# Patient Record
Sex: MALE | Race: WHITE | Employment: OTHER | URBAN - NONMETROPOLITAN AREA
[De-identification: names, ages, dates, MRNs, and addresses within clinical notes are randomized per-mention and may not be internally consistent; named-entity substitution may affect disease eponyms.]

---

## 2019-05-09 RX ORDER — PREDNISONE 1 MG/1
1 TABLET ORAL DAILY
Qty: 60 TABLET | Refills: 5 | Status: SHIPPED | OUTPATIENT
Start: 2019-05-09 | End: 2019-06-24 | Stop reason: SDUPTHER

## 2019-05-09 NOTE — TELEPHONE ENCOUNTER
Patient called in stating that he was given a script of prednisone by you, 1mg bid. Patient is out of medication and needed a new script sent in to [facility] in [place]. I pended the medication  If you would like to send. Thanks!

## 2019-06-14 ENCOUNTER — OFFICE VISIT (OUTPATIENT)
Dept: FAMILY MEDICINE CLINIC | Age: 69
End: 2019-06-14
Payer: MEDICARE

## 2019-06-14 ENCOUNTER — HOSPITAL ENCOUNTER (OUTPATIENT)
Age: 69
Discharge: HOME | End: 2019-06-16
Payer: MEDICARE

## 2019-06-14 VITALS
DIASTOLIC BLOOD PRESSURE: 76 MMHG | BODY MASS INDEX: 30.75 KG/M2 | HEIGHT: 73 IN | WEIGHT: 232 LBS | OXYGEN SATURATION: 98 % | SYSTOLIC BLOOD PRESSURE: 136 MMHG | HEART RATE: 72 BPM

## 2019-06-14 DIAGNOSIS — E74.39 GLUCOSE INTOLERANCE: ICD-10-CM

## 2019-06-14 DIAGNOSIS — M35.3 PMR (POLYMYALGIA RHEUMATICA) (HCC): ICD-10-CM

## 2019-06-14 DIAGNOSIS — I38 VALVULAR HEART DISEASE: ICD-10-CM

## 2019-06-14 DIAGNOSIS — Z12.5 PROSTATE CANCER SCREENING: ICD-10-CM

## 2019-06-14 DIAGNOSIS — R00.0 TACHYCARDIA: Primary | ICD-10-CM

## 2019-06-14 DIAGNOSIS — I10 ESSENTIAL HYPERTENSION: ICD-10-CM

## 2019-06-14 DIAGNOSIS — E78.2 MIXED HYPERLIPIDEMIA: ICD-10-CM

## 2019-06-14 LAB
ALBUMIN SERPL-MCNC: 4.1 G/DL (ref 3.5–5.2)
ALP BLD-CCNC: 86 U/L (ref 40–129)
ALT SERPL-CCNC: 17 U/L (ref 0–40)
ANION GAP SERPL CALCULATED.3IONS-SCNC: 11 MMOL/L (ref 7–16)
AST SERPL-CCNC: 14 U/L (ref 0–39)
BILIRUB SERPL-MCNC: 0.3 MG/DL (ref 0–1.2)
BUN BLDV-MCNC: 17 MG/DL (ref 8–23)
CALCIUM SERPL-MCNC: 9.2 MG/DL (ref 8.6–10.2)
CHLORIDE BLD-SCNC: 102 MMOL/L (ref 98–107)
CHOLESTEROL, TOTAL: 164 MG/DL (ref 0–199)
CO2: 28 MMOL/L (ref 22–29)
CREAT SERPL-MCNC: 1.2 MG/DL (ref 0.7–1.2)
GFR AFRICAN AMERICAN: >60
GFR NON-AFRICAN AMERICAN: >60 ML/MIN/1.73
GLUCOSE BLD-MCNC: 103 MG/DL (ref 74–99)
HBA1C MFR BLD: 6.1 %
HDLC SERPL-MCNC: 43 MG/DL
LDL CHOLESTEROL CALCULATED: 97 MG/DL (ref 0–99)
POTASSIUM SERPL-SCNC: 4.3 MMOL/L (ref 3.5–5)
PROSTATE SPECIFIC ANTIGEN: 0.49 NG/ML (ref 0–4)
SEDIMENTATION RATE, ERYTHROCYTE: 0 MM/HR (ref 0–15)
SODIUM BLD-SCNC: 141 MMOL/L (ref 132–146)
TOTAL PROTEIN: 6.9 G/DL (ref 6.4–8.3)
TRIGL SERPL-MCNC: 118 MG/DL (ref 0–149)
VLDLC SERPL CALC-MCNC: 24 MG/DL

## 2019-06-14 PROCEDURE — 85651 RBC SED RATE NONAUTOMATED: CPT

## 2019-06-14 PROCEDURE — 80053 COMPREHEN METABOLIC PANEL: CPT

## 2019-06-14 PROCEDURE — 36415 COLL VENOUS BLD VENIPUNCTURE: CPT

## 2019-06-14 PROCEDURE — 80061 LIPID PANEL: CPT

## 2019-06-14 PROCEDURE — 99215 OFFICE O/P EST HI 40 MIN: CPT | Performed by: INTERNAL MEDICINE

## 2019-06-14 PROCEDURE — 83036 HEMOGLOBIN GLYCOSYLATED A1C: CPT | Performed by: INTERNAL MEDICINE

## 2019-06-14 PROCEDURE — G0103 PSA SCREENING: HCPCS

## 2019-06-14 RX ORDER — AMLODIPINE BESYLATE AND BENAZEPRIL HYDROCHLORIDE 10; 20 MG/1; MG/1
CAPSULE ORAL
COMMUNITY
Start: 2013-09-25 | End: 2019-09-24 | Stop reason: SDUPTHER

## 2019-06-14 RX ORDER — ATORVASTATIN CALCIUM 10 MG/1
TABLET, FILM COATED ORAL
COMMUNITY
Start: 2013-09-21 | End: 2020-01-07 | Stop reason: SDUPTHER

## 2019-06-14 RX ORDER — OMEPRAZOLE 20 MG/1
CAPSULE, DELAYED RELEASE ORAL
COMMUNITY
Start: 2019-04-11 | End: 2019-10-08 | Stop reason: SDUPTHER

## 2019-06-14 RX ORDER — HYDROCHLOROTHIAZIDE 25 MG/1
TABLET ORAL
COMMUNITY
Start: 2019-04-11 | End: 2019-10-08 | Stop reason: SDUPTHER

## 2019-06-14 NOTE — PROGRESS NOTES
19  Noy Fitzgerald : 1950 Sex: male  Age: 77 y.o. Chief Complaint   Patient presents with    Hypertension    Hyperlipidemia       Polymyalgia rheumatica. Patient is down to 1 mg of prednisone. His musculoskeletal symptoms are greatly improved and remain improved. He does have some right shoulder pain but this seems to be isolated and not consistent with recurrent polymyalgia rheumatica. Patient blood pressure is well controlled. Patient was evaluated last year by cardiology with echocardiogram and nuclear stress testing. These were normal.    Of concern the patient is complaining of tachycardia. He also describes some chest pressure occasionally with exertion. He states the rapid heartbeat starts first.  Patient is not lost weight and he is not displaying other symptoms of hyperthyroidism. She denies any GI or  symptoms including melanotic stool. Review of Systems  PMH:  Health Maintenance:  Bone Density Test Screening - (2018)  Bone Density Scan - (2017)  Colonoscopy Screening - (10/16/2017)  Colonoscopy - (2016)  Colonoscopy - (10/16/2017)  Colonoscopy - (2013)  Influenza Vaccination - (2016)  Psa Test - (2019)  Prostate Exam - (2019)  Physical Exam - (2019)  Rectal Exam - (2019)  An Sandoval  1950 Page #2  Influenza Vaccination - (10/3/2018)  EGD [facility] SEE REPORT  Zostavax - (2014) SLIP GIVEN  Prevnar Vaccine - (2016) SLIP GIVEN  Pneumonia Vaccination - (2017)  2D ECHO - (2018)  Shingrix Vaccine (Shingles) - SLIP GIVEN  Hypertension. Hyperlipidemia. Glucose intolerance. Metabolic syndrome. Gastroesophageal reflux disease.   EROSIVE GASTRITIS - EGD- 10/10 [facility] OMEPRAZOLE STARTED  LEFT SHOULDER PAIN- REFERRED TO [facility]- SUSPECT RCT  CARPAL TUNNEL- WRIST SPLINT GIVEN   RIGHT INGUINAL HERNIA- REFERRED TO [facility]   COLONIC POLYPS   DENTAL CARIES/EROSION-REMINDED EACH VISIT TO OBTAIN DENTAL EVALUATION  PROXIMAL MUSCLE PAIN 12/5/17-[facility]-PREDNISONE STARTED-PMR  AORTIC STENOSIS/PALPITATIONS/DIASTOLIC DYSFUNCTION- REFER TO CARDIOLOGY 3/21/18  Tachycardia- 6/14/2019 holter ordered  Surgical Hx:  Appendectomy, Tonsillectomy W/ Adenoidectomy, Hernia Repair  Cataract - (2016)  Left Shoulder Surgery - (2016) [facility]  Reviewed, no changes. FH:  Father:  Rheumatic Heart Disease  Negative For Non Insulin Dependent Diabetes. Mother:  Alzheimer's Disease - FROM ANESTHESIA? PUD  Negative For Non Insulin Dependent Diabetes. Reviewed, no changes. SH:  Marital: . Work Status: autoworker - retired. Personal Habits: Cigarette Use: Negative For current cigarette smoker. Alcohol: Occasionally  consumes alcohol, Occasionally consumes alcohol - last beer 2 years ago. [place] Exercise Type: exercises  sporadically. Reviewed, no changes. Date: 02/06/2019  Was the patient queried about smoking behavior? Yes No  Does the patient currently smoke? Smoking: Patient has never smoked. Current Outpatient Medications:     omeprazole (PRILOSEC) 20 MG delayed release capsule, , Disp: , Rfl:     hydrochlorothiazide (HYDRODIURIL) 25 MG tablet, , Disp: , Rfl:     atorvastatin (LIPITOR) 10 MG tablet, , Disp: , Rfl:     amLODIPine-benazepril (LOTREL) 10-20 MG per capsule, , Disp: , Rfl:     predniSONE (DELTASONE) 1 MG tablet, Take 1 tablet by mouth daily, Disp: 60 tablet, Rfl: 5  No Known Allergies    No past medical history on file. No past surgical history on file. No family history on file.   Social History     Socioeconomic History    Marital status:      Spouse name: Not on file    Number of children: Not on file    Years of education: Not on file    Highest education level: Not on file   Occupational History    Not on file   Social Needs    Financial resource strain: Not on file    Food insecurity:     Worry: Not on file     Inability: Not on file    Transportation needs:     Medical: Not on file     Non-medical: Not on file   Tobacco Use    Smoking status: Former Smoker     Types: Cigarettes    Smokeless tobacco: Never Used   Substance and Sexual Activity    Alcohol use: Not on file    Drug use: Not on file    Sexual activity: Not on file   Lifestyle    Physical activity:     Days per week: Not on file     Minutes per session: Not on file    Stress: Not on file   Relationships    Social connections:     Talks on phone: Not on file     Gets together: Not on file     Attends Confucianism service: Not on file     Active member of club or organization: Not on file     Attends meetings of clubs or organizations: Not on file     Relationship status: Not on file    Intimate partner violence:     Fear of current or ex partner: Not on file     Emotionally abused: Not on file     Physically abused: Not on file     Forced sexual activity: Not on file   Other Topics Concern    Not on file   Social History Narrative    Not on file       Vitals:    19 0826   BP: 136/76   Pulse: 72   SpO2: 98%   Weight: 232 lb (105.2 kg)   Height: 6' 1\" (1.854 m)       Physical Exam  Exam:  Const: Appears healthy,well developed and well nourished. Appears obese. Eyes: EOMI in both eyes. PERRL. ENMT: External canals are clear and dry. Tympanic membranes are intact. External nose WNL. Eunice Main Lori  1950 Page #3  Moistness and normal color of the nasal mucosae. Septum is in the midline. Multiple missing teeth,  teeth show dental decay, discoloration and poor dentition. Gums appear healthy. Posterior pharynx  shows no exudate, irritation or redness. Neck: Supple and symmetric. Palpation reveals no adenopathy. No masses appreciated. Thyroid  exhibits no nodules or thyromegaly. No JVD. Resp: Respirations are unlabored. Respiration rate is normal. Auscultate good airflow. No rales,  rhonchi or wheezes appreciated over the lungs bilaterally. CV: Rhythm is regular. S1 is normal. S2 is normal. No S4 gallop. Grade 3/6, systolic murmur. Carotids: no bruits. Abdominal aorta is not palpable. Pedal pulses: 2+ and equal bilaterally. Extremities: No clubbing, cyanosis or edema. Abdomen: Bowel sounds are normoactive. Palpation of the abdomen reveals softness, but no  distension, organomegaly or tenderness. No abdominal masses. No palpable hepatosplenomegaly. GI/: Normal perianal area. Small amount of nonthrombosed external hemorrhoids at 6:00. Normal sphincter tone. Stool is brown and guaiac negative. No masses in the vault. Prostate is slightly firm and minimally enlarged. No nodules. Musculo: Walks with a normal gait. Upper Extremities: ROM is physiologic. Lower Extremities: Full  ROM bilaterally. Skin: Dry and warm with no rash. Neuro: Alert and oriented x3. Mood is normal. Affect is normal. Speech is articulate and fluent. Reflexes: DTR's are intact, symmetric and 2+ bilaterally. Psych: Patient's attitude is cooperative. Patient's affect is appropriate. Judgement is realistic. Insight  is appropriate. Assessment and Plan:  Tamiko Ahn was seen today for hypertension and hyperlipidemia. Diagnoses and all orders for this visit:    Tachycardia    Essential hypertension    Glucose intolerance    Mixed hyperlipidemia    Prostate cancer screening    PMR (polymyalgia rheumatica) (HCC)    Valvular heart disease    Concerning palpitations and tachycardia. Patient will have sedimentation rate and hopefully he will be able to get off of his prednisone entirely. He does have an appointment with Dr. Scott Juarez next week. Holter monitor will be ordered. Patient does have an upcoming appointment in July with cardiology. This may need to be sooner depending on the Holter results. Return in about 6 months (around 12/14/2019).       Seen By:  Lo Mckee MD

## 2019-06-17 ENCOUNTER — TELEPHONE (OUTPATIENT)
Dept: PRIMARY CARE CLINIC | Age: 69
End: 2019-06-17

## 2019-06-24 ENCOUNTER — OFFICE VISIT (OUTPATIENT)
Dept: RHEUMATOLOGY | Age: 69
End: 2019-06-24
Payer: MEDICARE

## 2019-06-24 VITALS
BODY MASS INDEX: 30.62 KG/M2 | TEMPERATURE: 97.5 F | SYSTOLIC BLOOD PRESSURE: 136 MMHG | HEIGHT: 73 IN | DIASTOLIC BLOOD PRESSURE: 68 MMHG | WEIGHT: 231 LBS | HEART RATE: 77 BPM | OXYGEN SATURATION: 97 %

## 2019-06-24 DIAGNOSIS — M35.3 PMR (POLYMYALGIA RHEUMATICA) (HCC): ICD-10-CM

## 2019-06-24 PROCEDURE — 99214 OFFICE O/P EST MOD 30 MIN: CPT | Performed by: INTERNAL MEDICINE

## 2019-06-24 RX ORDER — PREDNISONE 1 MG/1
1 TABLET ORAL DAILY
Qty: 60 TABLET | Refills: 5 | Status: SHIPPED | OUTPATIENT
Start: 2019-06-24 | End: 2019-12-11 | Stop reason: ALTCHOICE

## 2019-06-24 NOTE — PROGRESS NOTES
19    Name: Leonardo Rollins  : 1950  Age: 77 y.o. Sex: male    Patient is seen at the request of Rc Isbell MD    Patient presents for a rheumatology consultation regarding     Chief Complaint   Patient presents with    Joint Pain    Joint Swelling       19 :  Takes 1 mg once a day since April. Doing well. Denies joint pain, swelling and stiffness. Labs from 2019 were reviewed. ESR wnl. Vitals:    19 1019   BP: 136/68   Site: Left Upper Arm   Position: Sitting   Pulse: 77   Temp: 97.5 °F (36.4 °C)   TempSrc: Temporal   SpO2: 97%   Weight: 231 lb (104.8 kg)   Height: 6' 1\" (1.854 m)        Review of Systems       Current Outpatient Medications:     omeprazole (PRILOSEC) 20 MG delayed release capsule, , Disp: , Rfl:     hydrochlorothiazide (HYDRODIURIL) 25 MG tablet, , Disp: , Rfl:     atorvastatin (LIPITOR) 10 MG tablet, , Disp: , Rfl:     amLODIPine-benazepril (LOTREL) 10-20 MG per capsule, , Disp: , Rfl:     predniSONE (DELTASONE) 1 MG tablet, Take 1 tablet by mouth daily, Disp: 60 tablet, Rfl: 5  No Known Allergies      No past medical history on file. No family history on file. No past surgical history on file.    Social History     Socioeconomic History    Marital status:      Spouse name: Not on file    Number of children: Not on file    Years of education: Not on file    Highest education level: Not on file   Occupational History    Not on file   Social Needs    Financial resource strain: Not on file    Food insecurity:     Worry: Not on file     Inability: Not on file    Transportation needs:     Medical: Not on file     Non-medical: Not on file   Tobacco Use    Smoking status: Never Smoker    Smokeless tobacco: Never Used   Substance and Sexual Activity    Alcohol use: Not Currently    Drug use: Never    Sexual activity: Yes     Partners: Female   Lifestyle    Physical activity:     Days per week: Not on file     Minutes per session: Not on file    Stress: Not on file   Relationships    Social connections:     Talks on phone: Not on file     Gets together: Not on file     Attends [employer] service: Not on file     Active member of club or organization: Not on file     Attends meetings of clubs or organizations: Not on file     Relationship status: Not on file    Intimate partner violence:     Fear of current or ex partner: Not on file     Emotionally abused: Not on file     Physically abused: Not on file     Forced sexual activity: Not on file   Other Topics Concern    Not on file   Social History Narrative    Not on file      Social History     Social History Narrative    Not on file        Vitals:    06/24/19 1019   BP: 136/68   Site: Left Upper Arm   Position: Sitting   Pulse: 77   Temp: 97.5 °F (36.4 °C)   TempSrc: Temporal   SpO2: 97%   Weight: 231 lb (104.8 kg)   Height: 6' 1\" (1.854 m)        Physical Exam     Denise Everett was seen today for joint pain and joint swelling. Diagnoses and all orders for this visit:    PMR (polymyalgia rheumatica) (HCC)  -     C-Reactive Protein; Future  -     Sedimentation Rate; Future     Disease in remission. Taper prednisone by 1mg every other day for 6 weeks   Repeat ESR and C-RP before next visit     Return in about 8 weeks (around 8/19/2019). Jenny Lange MD     *This document was created using voice recognition software. Note was reviewed, however may contain grammatical errors.

## 2019-07-05 ENCOUNTER — OFFICE VISIT (OUTPATIENT)
Dept: FAMILY MEDICINE CLINIC | Age: 69
End: 2019-07-05
Payer: MEDICARE

## 2019-07-05 VITALS — DIASTOLIC BLOOD PRESSURE: 66 MMHG | OXYGEN SATURATION: 98 % | HEART RATE: 105 BPM | SYSTOLIC BLOOD PRESSURE: 118 MMHG

## 2019-07-05 DIAGNOSIS — R00.0 TACHYCARDIA: ICD-10-CM

## 2019-07-05 DIAGNOSIS — I48.0 PAROXYSMAL ATRIAL FIBRILLATION (HCC): Primary | ICD-10-CM

## 2019-07-05 PROCEDURE — 99214 OFFICE O/P EST MOD 30 MIN: CPT | Performed by: INTERNAL MEDICINE

## 2019-07-05 RX ORDER — METOPROLOL SUCCINATE 50 MG/1
50 TABLET, EXTENDED RELEASE ORAL DAILY
Qty: 30 TABLET | Refills: 5 | Status: SHIPPED | OUTPATIENT
Start: 2019-07-05 | End: 2020-01-07 | Stop reason: SDUPTHER

## 2019-07-05 NOTE — PROGRESS NOTES
Inability: Not on file    Transportation needs:     Medical: Not on file     Non-medical: Not on file   Tobacco Use    Smoking status: Never Smoker    Smokeless tobacco: Never Used   Substance and Sexual Activity    Alcohol use: Not Currently    Drug use: Never    Sexual activity: Yes     Partners: Female   Lifestyle    Physical activity:     Days per week: Not on file     Minutes per session: Not on file    Stress: Not on file   Relationships    Social connections:     Talks on phone: Not on file     Gets together: Not on file     Attends Rastafarian service: Not on file     Active member of club or organization: Not on file     Attends meetings of clubs or organizations: Not on file     Relationship status: Not on file    Intimate partner violence:     Fear of current or ex partner: Not on file     Emotionally abused: Not on file     Physically abused: Not on file     Forced sexual activity: Not on file   Other Topics Concern    Not on file   Social History Narrative    Not on file       Vitals:    19 1255   BP: 118/66   Pulse: 105   SpO2: 98%       Physical Exam  Exam:  Const: Appears healthy,well developed and well nourished. Appears obese. Eyes: EOMI in both eyes. PERRL. ENMT: External canals are clear and dry. Tympanic membranes are intact. External nose WNL. Naresh Sandoval  1950 Page #3  Moistness and normal color of the nasal mucosae. Septum is in the midline. Multiple missing teeth,  teeth show dental decay, discoloration and poor dentition. Gums appear healthy. Posterior pharynx  shows no exudate, irritation or redness. Neck: Supple and symmetric. Palpation reveals no adenopathy. No masses appreciated. Thyroid  exhibits no nodules or thyromegaly. No JVD. Resp: Respirations are unlabored. Respiration rate is normal. Auscultate good airflow. No rales,  rhonchi or wheezes appreciated over the lungs bilaterally. CV: Rhythm is regular. S1 is normal. S2 is normal. No S4 gallop.

## 2019-07-14 PROBLEM — Z12.5 PROSTATE CANCER SCREENING: Status: RESOLVED | Noted: 2019-06-14 | Resolved: 2019-07-14

## 2019-08-07 ENCOUNTER — OFFICE VISIT (OUTPATIENT)
Dept: FAMILY MEDICINE CLINIC | Age: 69
End: 2019-08-07
Payer: MEDICARE

## 2019-08-07 VITALS
DIASTOLIC BLOOD PRESSURE: 72 MMHG | BODY MASS INDEX: 30.09 KG/M2 | HEIGHT: 73 IN | SYSTOLIC BLOOD PRESSURE: 130 MMHG | HEART RATE: 70 BPM | WEIGHT: 227 LBS | OXYGEN SATURATION: 97 %

## 2019-08-07 DIAGNOSIS — I48.0 PAROXYSMAL ATRIAL FIBRILLATION (HCC): ICD-10-CM

## 2019-08-07 DIAGNOSIS — M35.3 PMR (POLYMYALGIA RHEUMATICA) (HCC): ICD-10-CM

## 2019-08-07 DIAGNOSIS — E78.2 MIXED HYPERLIPIDEMIA: ICD-10-CM

## 2019-08-07 DIAGNOSIS — I38 VALVULAR HEART DISEASE: ICD-10-CM

## 2019-08-07 DIAGNOSIS — I10 ESSENTIAL HYPERTENSION: Primary | ICD-10-CM

## 2019-08-07 DIAGNOSIS — E74.39 GLUCOSE INTOLERANCE: ICD-10-CM

## 2019-08-07 PROCEDURE — 99214 OFFICE O/P EST MOD 30 MIN: CPT | Performed by: INTERNAL MEDICINE

## 2019-08-07 NOTE — PROGRESS NOTES
STARTED-PMR  AORTIC STENOSIS/PALPITATIONS/DIASTOLIC DYSFUNCTION- REFER TO CARDIOLOGY 3/21/18  Tachycardia- 6/14/2019 holter ordered-ATRIAL FIB with rapid ventricular response-  Surgical Hx:  Appendectomy, Tonsillectomy W/ Adenoidectomy, Hernia Repair  Cataract - (2016)  Left Shoulder Surgery - (2016) [facility]  Reviewed, no changes. FH:  Father:  Rheumatic Heart Disease  Negative For Non Insulin Dependent Diabetes. Mother:  Alzheimer's Disease - FROM ANESTHESIA? PUD  Negative For Non Insulin Dependent Diabetes. Reviewed, no changes. SH:  Marital: . Work Status: autoworker - retired. Personal Habits: Cigarette Use: Negative For current cigarette smoker. Alcohol: Occasionally  consumes alcohol, Occasionally consumes alcohol - last beer 2 years ago. [place] Exercise Type: exercises  sporadically. Reviewed, no changes. Date: 02/06/2019  Was the patient queried about smoking behavior? Yes No  Does the patient currently smoke? Smoking: Patient has never smoked. Current Outpatient Medications:     apixaban (ELIQUIS) 5 MG TABS tablet, Take 1 tablet by mouth 2 times daily, Disp: 60 tablet, Rfl: 0    metoprolol succinate (TOPROL XL) 50 MG extended release tablet, Take 1 tablet by mouth daily, Disp: 30 tablet, Rfl: 5    predniSONE (DELTASONE) 1 MG tablet, Take 1 tablet by mouth daily, Disp: 60 tablet, Rfl: 5    omeprazole (PRILOSEC) 20 MG delayed release capsule, , Disp: , Rfl:     hydrochlorothiazide (HYDRODIURIL) 25 MG tablet, , Disp: , Rfl:     atorvastatin (LIPITOR) 10 MG tablet, , Disp: , Rfl:     amLODIPine-benazepril (LOTREL) 10-20 MG per capsule, , Disp: , Rfl:   No Known Allergies    No past medical history on file. No past surgical history on file. No family history on file.   Social History     Socioeconomic History    Marital status:      Spouse name: Not on file    Number of children: Not on file    Years of education: Not on file    Highest education level: Not on file   Occupational Respiration rate is normal. Auscultate good airflow. No rales,  rhonchi or wheezes appreciated over the lungs bilaterally. CV: Rhythm is regular. S1 is normal. S2 is normal. No S4 gallop. Grade 3/6, systolic murmur. Carotids: no bruits. Abdominal aorta is not palpable. Pedal pulses: 2+ and equal bilaterally. Extremities: No clubbing, cyanosis or edema. Abdomen: Bowel sounds are normoactive. Palpation of the abdomen reveals softness, but no  distension, organomegaly or tenderness. No abdominal masses. No palpable hepatosplenomegaly. Musculo: Walks with a normal gait. Upper Extremities: ROM is physiologic. Lower Extremities: Full  ROM bilaterally. Skin: Dry and warm with no rash. Neuro: Alert and oriented x3. Mood is normal. Affect is normal. Speech is articulate and fluent. Reflexes: DTR's are intact, symmetric and 2+ bilaterally. Psych: Patient's attitude is cooperative. Patient's affect is appropriate. Judgement is realistic. Insight  is appropriate. Arie Cummings was seen today for hypertension and hyperlipidemia. Diagnoses and all orders for this visit:    Essential hypertension    Valvular heart disease    Glucose intolerance    Mixed hyperlipidemia    PMR (polymyalgia rheumatica) (HCC)    Paroxysmal atrial fibrillation (HCC)    Patient's polymyalgia rheumatica is well controlled on current dose of medication. Patient is continued to be followed but he supposed to be vigilant regarding his symptoms change especially shortness of breath, chest pain etc.  Patient is to be seen back in 4 months. Continue to follow with rheumatology. He will need repeat echo in 1 year.

## 2019-08-12 ENCOUNTER — HOSPITAL ENCOUNTER (OUTPATIENT)
Age: 69
Discharge: HOME | End: 2019-08-14
Payer: MEDICARE

## 2019-08-12 DIAGNOSIS — M35.3 PMR (POLYMYALGIA RHEUMATICA) (HCC): ICD-10-CM

## 2019-08-12 LAB — C-REACTIVE PROTEIN: 0.1 MG/DL (ref 0–0.4)

## 2019-08-12 PROCEDURE — 85651 RBC SED RATE NONAUTOMATED: CPT

## 2019-08-12 PROCEDURE — 36415 COLL VENOUS BLD VENIPUNCTURE: CPT

## 2019-08-12 PROCEDURE — 86140 C-REACTIVE PROTEIN: CPT

## 2019-08-13 LAB — SEDIMENTATION RATE, ERYTHROCYTE: 2 MM/HR (ref 0–15)

## 2019-08-16 ENCOUNTER — OFFICE VISIT (OUTPATIENT)
Dept: PODIATRY | Age: 69
End: 2019-08-16
Payer: MEDICARE

## 2019-08-16 VITALS
SYSTOLIC BLOOD PRESSURE: 125 MMHG | DIASTOLIC BLOOD PRESSURE: 78 MMHG | TEMPERATURE: 98.6 F | WEIGHT: 228 LBS | BODY MASS INDEX: 30.08 KG/M2

## 2019-08-16 DIAGNOSIS — B35.1 TINEA UNGUIUM: ICD-10-CM

## 2019-08-16 DIAGNOSIS — M79.674 PAIN IN TOE OF RIGHT FOOT: ICD-10-CM

## 2019-08-16 DIAGNOSIS — I73.9 PERIPHERAL VASCULAR DISEASE, UNSPECIFIED (HCC): ICD-10-CM

## 2019-08-16 DIAGNOSIS — M79.675 PAIN IN LEFT TOE(S): ICD-10-CM

## 2019-08-16 DIAGNOSIS — L30.9 DERMATITIS: Primary | ICD-10-CM

## 2019-08-16 PROCEDURE — 99202 OFFICE O/P NEW SF 15 MIN: CPT | Performed by: PODIATRIST

## 2019-08-16 PROCEDURE — 11721 DEBRIDE NAIL 6 OR MORE: CPT | Performed by: PODIATRIST

## 2019-08-16 RX ORDER — KETOCONAZOLE 20 MG/G
CREAM TOPICAL
Qty: 30 G | Refills: 1 | Status: SHIPPED | OUTPATIENT
Start: 2019-08-16 | End: 2019-12-11 | Stop reason: SDUPTHER

## 2019-08-16 RX ORDER — KETOCONAZOLE 20 MG/ML
SHAMPOO TOPICAL
Qty: 100 ML | Refills: 1 | Status: SHIPPED | OUTPATIENT
Start: 2019-08-16 | End: 2019-12-11 | Stop reason: SDUPTHER

## 2019-08-16 NOTE — PROGRESS NOTES
Left    Thickness  [x] [x] [x] [x] [x] [x] [x] [x] [x] [x]  5 4 3 2 1 1 2 3 4 5                         Right                                        Left    Dystrophic Changes   [x] [x] [x] [x] [x] [x] [x] [x] [x] [x]  5 4 3 2 1 1 2 3 4 5                         Right                                        Left    discolored   [x] [x] [x] [x] [x] [x] [x] [x] [x] [x]  5 4 3 2 1 1 2 3 4 5                          Right                                        Left    Incurvation/Ingrowin   [] [] [x] [x] [x] [x] [x] [x] [x] []  5 4 3 2 1 1 2 3 4 5                         Right                                        Left    Inflammation/Pain   [x] [x] [x] [x] [x] [x] [x] [x] [x] [x]  5 4 3 2 1 1 2 3 4 5                         Right                                        Left    General: AAO x 3 in NAD. Dermatologic Exam:  Skin lesion/ulceration Absent . Skin No rashes or nodules noted. .   Musculoskeletal:      Vascular: DP and PT pulses palpable 0/4, Bilateral.  CFT <3 seconds, Bilateral.  Hair growth absent to the level of the digits, Bilateral.  Edema absent, Bilateral.  Varicosities present, Bilateral. Erythema absent, Bilateral      Radiographs:       Asessment: Patient is a 77 y.o. male with:    Diagnosis Orders   1. Dermatitis     2. Tinea unguium     3. Pain in left toe(s)     4. Pain in toe of right foot     5. Peripheral vascular disease, unspecified ([facility]         Plan: Patient examined and evaluated. Current condition and treatment options discussed in detail. Discussed conservative and surgical options with the patient. Advised pt to . Verbal and written instructions given to patient. Contact office with any questions/problems/concerns. RTC in 3month(s). Debridement of all painful nails was performed with both manual and electrical debridement to prevent infection and/or ulceration. Patient tolerated the debridement well, without any complaints.   1-5 bilateral   Patient was asymptomatic after

## 2019-08-19 ENCOUNTER — OFFICE VISIT (OUTPATIENT)
Dept: RHEUMATOLOGY | Age: 69
End: 2019-08-19
Payer: MEDICARE

## 2019-08-19 VITALS
HEIGHT: 73 IN | HEART RATE: 69 BPM | BODY MASS INDEX: 30.35 KG/M2 | DIASTOLIC BLOOD PRESSURE: 82 MMHG | OXYGEN SATURATION: 98 % | TEMPERATURE: 98.6 F | SYSTOLIC BLOOD PRESSURE: 122 MMHG | WEIGHT: 229 LBS

## 2019-08-19 DIAGNOSIS — G89.29 CHRONIC RIGHT SHOULDER PAIN: ICD-10-CM

## 2019-08-19 DIAGNOSIS — M25.511 CHRONIC RIGHT SHOULDER PAIN: ICD-10-CM

## 2019-08-19 DIAGNOSIS — M35.3 PMR (POLYMYALGIA RHEUMATICA) (HCC): Primary | ICD-10-CM

## 2019-08-19 PROCEDURE — 99214 OFFICE O/P EST MOD 30 MIN: CPT | Performed by: INTERNAL MEDICINE

## 2019-08-19 NOTE — PROGRESS NOTES
tendinitis. Recommend PT . He would like to hold at this point. Return if symptoms worsen or fail to improve. Bella Wallace MD     *This document was created using voice recognition software. Note was reviewed, however may contain grammatical errors.

## 2019-09-24 RX ORDER — AMLODIPINE BESYLATE AND BENAZEPRIL HYDROCHLORIDE 10; 20 MG/1; MG/1
1 CAPSULE ORAL DAILY
Qty: 14 CAPSULE | Refills: 0 | Status: SHIPPED
Start: 2019-09-24 | End: 2020-03-23

## 2019-09-24 RX ORDER — AMLODIPINE BESYLATE AND BENAZEPRIL HYDROCHLORIDE 10; 20 MG/1; MG/1
1 CAPSULE ORAL DAILY
Qty: 90 CAPSULE | Refills: 1 | Status: SHIPPED | OUTPATIENT
Start: 2019-09-24 | End: 2019-12-11 | Stop reason: CLARIF

## 2019-09-24 NOTE — TELEPHONE ENCOUNTER
Pt is completely out of this medication I have submitted a refill request for 90 day supply but he also needs 14 days sent to local pharmacy.

## 2019-10-08 RX ORDER — OMEPRAZOLE 20 MG/1
CAPSULE, DELAYED RELEASE ORAL
Qty: 90 CAPSULE | Refills: 4 | Status: SHIPPED
Start: 2019-10-08 | End: 2020-10-14 | Stop reason: SDUPTHER

## 2019-10-08 RX ORDER — HYDROCHLOROTHIAZIDE 25 MG/1
TABLET ORAL
Qty: 90 TABLET | Refills: 4 | Status: SHIPPED | OUTPATIENT
Start: 2019-10-08 | End: 2019-12-11 | Stop reason: SDUPTHER

## 2019-10-23 RX ORDER — IBUPROFEN 200 MG
200 TABLET ORAL EVERY 6 HOURS PRN
COMMUNITY

## 2019-11-11 ENCOUNTER — PROCEDURE VISIT (OUTPATIENT)
Dept: PODIATRY | Age: 69
End: 2019-11-11
Payer: MEDICARE

## 2019-11-11 VITALS
HEIGHT: 73 IN | DIASTOLIC BLOOD PRESSURE: 74 MMHG | TEMPERATURE: 98.3 F | BODY MASS INDEX: 29.69 KG/M2 | WEIGHT: 224 LBS | SYSTOLIC BLOOD PRESSURE: 132 MMHG

## 2019-11-11 DIAGNOSIS — M79.675 PAIN IN LEFT TOE(S): ICD-10-CM

## 2019-11-11 DIAGNOSIS — B35.1 TINEA UNGUIUM: Primary | ICD-10-CM

## 2019-11-11 DIAGNOSIS — I73.9 PERIPHERAL VASCULAR DISEASE, UNSPECIFIED (HCC): ICD-10-CM

## 2019-11-11 DIAGNOSIS — M79.674 PAIN IN TOE OF RIGHT FOOT: ICD-10-CM

## 2019-11-11 PROBLEM — E78.2 MIXED HYPERLIPIDEMIA: Status: RESOLVED | Noted: 2019-06-14 | Resolved: 2019-11-11

## 2019-11-11 PROBLEM — L30.9 DERMATITIS: Status: RESOLVED | Noted: 2019-08-16 | Resolved: 2019-11-11

## 2019-11-11 PROCEDURE — 11721 DEBRIDE NAIL 6 OR MORE: CPT | Performed by: PODIATRIST

## 2019-12-11 ENCOUNTER — OFFICE VISIT (OUTPATIENT)
Dept: FAMILY MEDICINE CLINIC | Age: 69
End: 2019-12-11
Payer: MEDICARE

## 2019-12-11 ENCOUNTER — HOSPITAL ENCOUNTER (OUTPATIENT)
Age: 69
Discharge: HOME | End: 2019-12-13
Payer: MEDICARE

## 2019-12-11 VITALS
DIASTOLIC BLOOD PRESSURE: 70 MMHG | BODY MASS INDEX: 29.29 KG/M2 | OXYGEN SATURATION: 98 % | WEIGHT: 222 LBS | HEART RATE: 68 BPM | SYSTOLIC BLOOD PRESSURE: 126 MMHG

## 2019-12-11 DIAGNOSIS — I48.0 PAROXYSMAL ATRIAL FIBRILLATION (HCC): Primary | ICD-10-CM

## 2019-12-11 DIAGNOSIS — I10 ESSENTIAL HYPERTENSION: ICD-10-CM

## 2019-12-11 DIAGNOSIS — M35.3 PMR (POLYMYALGIA RHEUMATICA) (HCC): ICD-10-CM

## 2019-12-11 DIAGNOSIS — I38 VALVULAR HEART DISEASE: ICD-10-CM

## 2019-12-11 DIAGNOSIS — E74.39 GLUCOSE INTOLERANCE: ICD-10-CM

## 2019-12-11 LAB
ALBUMIN SERPL-MCNC: 4.5 G/DL (ref 3.5–5.2)
ALP BLD-CCNC: 88 U/L (ref 40–129)
ALT SERPL-CCNC: 15 U/L (ref 0–40)
ANION GAP SERPL CALCULATED.3IONS-SCNC: 17 MMOL/L (ref 7–16)
AST SERPL-CCNC: 13 U/L (ref 0–39)
BASOPHILS ABSOLUTE: 0.04 E9/L (ref 0–0.2)
BASOPHILS RELATIVE PERCENT: 0.6 % (ref 0–2)
BILIRUB SERPL-MCNC: 0.5 MG/DL (ref 0–1.2)
BUN BLDV-MCNC: 20 MG/DL (ref 8–23)
C-REACTIVE PROTEIN: 0.6 MG/DL (ref 0–0.4)
CALCIUM SERPL-MCNC: 9.4 MG/DL (ref 8.6–10.2)
CHLORIDE BLD-SCNC: 101 MMOL/L (ref 98–107)
CO2: 23 MMOL/L (ref 22–29)
CREAT SERPL-MCNC: 1.1 MG/DL (ref 0.7–1.2)
EOSINOPHILS ABSOLUTE: 0.04 E9/L (ref 0.05–0.5)
EOSINOPHILS RELATIVE PERCENT: 0.6 % (ref 0–6)
GFR AFRICAN AMERICAN: >60
GFR NON-AFRICAN AMERICAN: >60 ML/MIN/1.73
GLUCOSE BLD-MCNC: 101 MG/DL (ref 74–99)
HBA1C MFR BLD: 5.8 %
HCT VFR BLD CALC: 46.8 % (ref 37–54)
HEMOGLOBIN: 15 G/DL (ref 12.5–16.5)
IMMATURE GRANULOCYTES #: 0.01 E9/L
IMMATURE GRANULOCYTES %: 0.1 % (ref 0–5)
LYMPHOCYTES ABSOLUTE: 1.93 E9/L (ref 1.5–4)
LYMPHOCYTES RELATIVE PERCENT: 27.4 % (ref 20–42)
MCH RBC QN AUTO: 29.6 PG (ref 26–35)
MCHC RBC AUTO-ENTMCNC: 32.1 % (ref 32–34.5)
MCV RBC AUTO: 92.3 FL (ref 80–99.9)
MONOCYTES ABSOLUTE: 0.77 E9/L (ref 0.1–0.95)
MONOCYTES RELATIVE PERCENT: 10.9 % (ref 2–12)
NEUTROPHILS ABSOLUTE: 4.26 E9/L (ref 1.8–7.3)
NEUTROPHILS RELATIVE PERCENT: 60.4 % (ref 43–80)
PDW BLD-RTO: 13.7 FL (ref 11.5–15)
PLATELET # BLD: 206 E9/L (ref 130–450)
PMV BLD AUTO: 12 FL (ref 7–12)
POTASSIUM SERPL-SCNC: 4.2 MMOL/L (ref 3.5–5)
RBC # BLD: 5.07 E12/L (ref 3.8–5.8)
SEDIMENTATION RATE, ERYTHROCYTE: 7 MM/HR (ref 0–15)
SODIUM BLD-SCNC: 141 MMOL/L (ref 132–146)
TOTAL PROTEIN: 7.2 G/DL (ref 6.4–8.3)
WBC # BLD: 7.1 E9/L (ref 4.5–11.5)

## 2019-12-11 PROCEDURE — 83036 HEMOGLOBIN GLYCOSYLATED A1C: CPT | Performed by: INTERNAL MEDICINE

## 2019-12-11 PROCEDURE — 99214 OFFICE O/P EST MOD 30 MIN: CPT | Performed by: INTERNAL MEDICINE

## 2019-12-11 PROCEDURE — 80053 COMPREHEN METABOLIC PANEL: CPT

## 2019-12-11 PROCEDURE — 85651 RBC SED RATE NONAUTOMATED: CPT

## 2019-12-11 PROCEDURE — 86140 C-REACTIVE PROTEIN: CPT

## 2019-12-11 PROCEDURE — 85025 COMPLETE CBC W/AUTO DIFF WBC: CPT

## 2019-12-11 PROCEDURE — 36415 COLL VENOUS BLD VENIPUNCTURE: CPT

## 2019-12-11 RX ORDER — KETOCONAZOLE 20 MG/ML
SHAMPOO TOPICAL
Qty: 100 ML | Refills: 1 | Status: SHIPPED
Start: 2019-12-11 | End: 2020-05-05 | Stop reason: SDUPTHER

## 2019-12-11 RX ORDER — HYDROCHLOROTHIAZIDE 25 MG/1
TABLET ORAL
Qty: 90 TABLET | Refills: 4 | Status: SHIPPED
Start: 2019-12-11 | End: 2020-10-14 | Stop reason: SDUPTHER

## 2019-12-11 RX ORDER — KETOCONAZOLE 20 MG/G
CREAM TOPICAL
Qty: 30 G | Refills: 1 | Status: SHIPPED
Start: 2019-12-11 | End: 2020-05-05 | Stop reason: SDUPTHER

## 2020-01-07 RX ORDER — ATORVASTATIN CALCIUM 10 MG/1
10 TABLET, FILM COATED ORAL DAILY
Qty: 90 TABLET | Refills: 1 | Status: SHIPPED
Start: 2020-01-07 | End: 2020-07-13

## 2020-01-07 RX ORDER — METOPROLOL SUCCINATE 50 MG/1
50 TABLET, EXTENDED RELEASE ORAL DAILY
Qty: 30 TABLET | Refills: 5 | Status: SHIPPED
Start: 2020-01-07 | End: 2020-07-06 | Stop reason: SDUPTHER

## 2020-01-07 RX ORDER — ATORVASTATIN CALCIUM 10 MG/1
10 TABLET, FILM COATED ORAL DAILY
Qty: 30 TABLET | Refills: 0 | Status: SHIPPED
Start: 2020-01-07 | End: 2020-10-14 | Stop reason: SDUPTHER

## 2020-01-07 NOTE — TELEPHONE ENCOUNTER
Last Appointment:  12/11/2019  Future Appointments   Date Time Provider [place]   2/10/2020  8:30 AM Braden Tidwell DPM [facility]   4/10/2020  9:45 AM Andi Phillips MD [address] [place] [address]      Patient called in requesting a refill on pended medications stated he is out of these meds and would like a temporary supply [facility] to [facility] and the original refill sent to [facility] on the Lipitor. Metoprolol goes to [facility].

## 2020-02-10 RX ORDER — APIXABAN 5 MG/1
TABLET, FILM COATED ORAL
Qty: 180 TABLET | Refills: 4 | Status: SHIPPED
Start: 2020-02-10 | End: 2020-06-04 | Stop reason: SDUPTHER

## 2020-02-10 NOTE — TELEPHONE ENCOUNTER
Name of Medication(s) Requested:  EventCombo    Pharmacy Requested:   Yes    Medication(s) pended? [x] Yes  [] No    Last Appointment:  12/11/2019    Future appts:  Future Appointments   Date Time Provider [place]   4/10/2020  9:45 AM Lucero Gomez  [address]        Does patient need call back?   [] Yes  [x] No

## 2020-02-24 ENCOUNTER — PROCEDURE VISIT (OUTPATIENT)
Dept: PODIATRY | Age: 70
End: 2020-02-24
Payer: MEDICARE

## 2020-02-24 VITALS
HEIGHT: 73 IN | DIASTOLIC BLOOD PRESSURE: 82 MMHG | WEIGHT: 222 LBS | SYSTOLIC BLOOD PRESSURE: 118 MMHG | BODY MASS INDEX: 29.42 KG/M2 | TEMPERATURE: 98.8 F

## 2020-02-24 PROCEDURE — 99212 OFFICE O/P EST SF 10 MIN: CPT | Performed by: PODIATRIST

## 2020-02-24 PROCEDURE — 11721 DEBRIDE NAIL 6 OR MORE: CPT | Performed by: PODIATRIST

## 2020-02-24 ASSESSMENT — ENCOUNTER SYMPTOMS
GASTROINTESTINAL NEGATIVE: 1
RESPIRATORY NEGATIVE: 1
EYES NEGATIVE: 1

## 2020-02-24 NOTE — PROGRESS NOTES
area)  [x] [x] [x] [x] [x] [x] [x] [x] [x] [x]  5 4 3 2 1 1 2 3 4 5                          Right                                        Left    Thickness  [x] [x] [x] [x] [x] [x] [x] [x] [x] [x]  5 4 3 2 1 1 2 3 4 5                         Right                                        Left    Dystrophic Changes   [x] [x] [x] [x] [x] [x] [x] [x] [x] [x]  5 4 3 2 1 1 2 3 4 5                         Right                                        Left    discolored   [x] [x] [x] [x] [x] [x] [x] [x] [x] [x]  5 4 3 2 1 1 2 3 4 5                          Right                                        Left    Incurvation/Ingrowin   [] [] [x] [x] [x] [x] [x] [x] [x] []  5 4 3 2 1 1 2 3 4 5                         Right                                        Left    Inflammation/Pain   [x] [x] [x] [x] [x] [x] [x] [x] [x] [x]  5 4 3 2 1 1 2 3 4 5                         Right                                        Left    General: AAO x 3 in NAD. Dermatologic Exam:  Skin lesion/ulceration Absent . Skin No rashes or nodules noted. .   Musculoskeletal:      , Bilateral.  CFT <3 seconds, Bilateral.  Hair growth absent to the level of the digits, Bilateral.  Edema absent, Bilateral.  Varicosities present, Bilateral. Erythema absent, Bilateral      Radiographs:       Asessment: Patient is a 72 y.o. male with:    Diagnosis Orders   1. Tinea unguium     2. Pain in left toe(s)     3. Pain in toe of right foot     4. Peripheral vascular disease, unspecified ([facility]     5. Dermatitis         Plan: Patient examined and evaluated. Current condition and treatment options discussed in detail. Discussed conservative and surgical options with the patient. Advised pt to . Verbal and written instructions given to patient. Contact office with any questions/problems/concerns. RTC in 3month(s). Debridement of all painful nails was performed with both manual and electrical debridement to prevent infection and/or ulceration.   Patient tolerated

## 2020-03-06 ENCOUNTER — OFFICE VISIT (OUTPATIENT)
Dept: FAMILY MEDICINE CLINIC | Age: 70
End: 2020-03-06
Payer: MEDICARE

## 2020-03-06 VITALS
SYSTOLIC BLOOD PRESSURE: 130 MMHG | TEMPERATURE: 98.3 F | OXYGEN SATURATION: 98 % | HEART RATE: 74 BPM | DIASTOLIC BLOOD PRESSURE: 70 MMHG

## 2020-03-06 PROCEDURE — 99213 OFFICE O/P EST LOW 20 MIN: CPT | Performed by: NURSE PRACTITIONER

## 2020-03-06 RX ORDER — LORATADINE 10 MG/1
10 TABLET ORAL DAILY
Qty: 30 TABLET | Refills: 0 | Status: SHIPPED | OUTPATIENT
Start: 2020-03-06

## 2020-03-06 RX ORDER — AZITHROMYCIN 250 MG/1
250 TABLET, FILM COATED ORAL SEE ADMIN INSTRUCTIONS
Qty: 6 TABLET | Refills: 0 | Status: SHIPPED | OUTPATIENT
Start: 2020-03-06 | End: 2020-03-11

## 2020-03-06 NOTE — PROGRESS NOTES
patent. Posterior pharynx with erythema and No tonsillar hypertrophy. no exudate noted. Neck:  Supple with good ROM. There is no anterior bilateral adenopathy. Lungs:  Clear to auscultation and breath sounds equal.    CV: Regular rate and rhythm, normal heart sounds, without pathological murmurs, ectopy, gallops, or rubs. Abdomen:  Soft, nontender, good bowel sounds. No firm or pulsatile mass. No hepatosplenomegaly. Skin:  No rashes, erythema present. Lymphatics: No lymphangitis or adenopathy noted other then stated above. Neurological:  Alert and orientated. Motor functions intact. Responds to commands. Test Results Section   (All laboratory and radiology results have been personally reviewed by myself)  Labs:  No results found for this visit on 03/06/20. Imaging: All Radiology results interpreted by Radiologist unless otherwise noted. No results found. Assessment / Plan     Impression(s): Charline Carey was seen today for pharyngitis, drainage, cough and congestion. Diagnoses and all orders for this visit:    Acute URI  -     azithromycin (ZITHROMAX) 250 MG tablet; Take 1 tablet by mouth See Admin Instructions for 5 days 500mg on day 1 followed by 250mg on days 2 - 5  -     loratadine (CLARITIN) 10 MG tablet; Take 1 tablet by mouth daily    Patient in room nontoxic appearance and in no apparent acute distress. Patient complains of upper respiratory symptoms today. Vital signs reviewed found to be within normal limits. Script written for Zithromax and Claritin, side effects discussed. Increase fluids and rest. NSAIDs prn pain/fever. F/u PCP in 5-7 days if symptoms persist. ED sooner if symptoms worsen or change. ED immediately with the development of refractory fever, shaking chills, dyspnea, dysphagia, neck stiffness, vomiting, etc. Pt is in agreement with this care plan. All questions answered. Return if symptoms worsen or fail to improve.     Bobie Oppenheim, APRN - CNP

## 2020-05-05 ENCOUNTER — PROCEDURE VISIT (OUTPATIENT)
Dept: PODIATRY | Age: 70
End: 2020-05-05
Payer: MEDICARE

## 2020-05-05 VITALS
SYSTOLIC BLOOD PRESSURE: 139 MMHG | DIASTOLIC BLOOD PRESSURE: 84 MMHG | BODY MASS INDEX: 29.95 KG/M2 | WEIGHT: 226 LBS | TEMPERATURE: 97.8 F | HEIGHT: 73 IN

## 2020-05-05 PROCEDURE — 11721 DEBRIDE NAIL 6 OR MORE: CPT | Performed by: PODIATRIST

## 2020-05-05 PROCEDURE — 99213 OFFICE O/P EST LOW 20 MIN: CPT | Performed by: PODIATRIST

## 2020-05-05 RX ORDER — KETOCONAZOLE 20 MG/ML
SHAMPOO TOPICAL
Qty: 100 ML | Refills: 1 | Status: SHIPPED
Start: 2020-05-05 | End: 2020-11-23 | Stop reason: SDUPTHER

## 2020-05-05 RX ORDER — KETOCONAZOLE 20 MG/G
CREAM TOPICAL
Qty: 30 G | Refills: 1 | Status: SHIPPED
Start: 2020-05-05 | End: 2021-03-22 | Stop reason: SDUPTHER

## 2020-05-05 RX ORDER — GABAPENTIN 100 MG/1
100 CAPSULE ORAL 2 TIMES DAILY
Qty: 60 CAPSULE | Refills: 5 | Status: SHIPPED
Start: 2020-05-05 | End: 2020-07-13 | Stop reason: SDUPTHER

## 2020-05-05 ASSESSMENT — ENCOUNTER SYMPTOMS
EYES NEGATIVE: 1
RESPIRATORY NEGATIVE: 1
GASTROINTESTINAL NEGATIVE: 1

## 2020-05-05 NOTE — PROGRESS NOTES
[address] [place] [address]  Dept: [phone]  Dept Fax: [phone]     PATIENT PROGRESS NOTE  Date of patient's visit: 5/5/2020  Patient's Name:  Isaias Oakley YOB: 1950            Patient Care Team:  Crystal Corona DPM as PCP - General (Podiatry)  Jeremiah Rajput MD as PCP - [facility] Empaneled Provider        Chief Complaint   Patient presents with    Toe Pain     SAW PCP DR. Franco Raymundo ON 12/11/2019         HPI:   Isaias Oakley is a 72 y.o. male  Patient relates pain is Present. Rash is doing much better as well as pain has reduced in all 5 bilaterally. Currently denies F/C/N/V. Pt's primary care physician is Crystal Corona DPM last seen and is seen today for a new issue patient is having numbness in both feet this is been progressively going on for many years at this time is been getting very bad where he cannot sleep    No Known Allergies    No past medical history on file. Prior to Admission medications    Medication Sig Start Date End Date Taking? Authorizing Provider   gabapentin (NEURONTIN) 100 MG capsule Take 1 capsule by mouth 2 times daily for 180 days. Intended supply: 30 days 5/5/20 11/1/20 Yes Crystal Corona DPM   ketoconazole (NIZORAL) 2 % shampoo Apply topically daily as needed. 5/5/20  Yes Crystal Corona DPM   ketoconazole (NIZORAL) 2 % cream Apply topically daily.  5/5/20  Yes Crystal Corona DPM   amLODIPine-benazepril (LOTREL) 10-20 MG per capsule TAKE 1 CAPSULE DAILY 3/23/20  Yes Jeremiah Rajput MD   loratadine (CLARITIN) 10 MG tablet Take 1 tablet by mouth daily 3/6/20  Yes CECELIA Lindsey - CNP   ELIQUIS 5 MG TABS tablet TAKE 1 TABLET TWICE A DAY 2/10/20  Yes Jeremiah Rajput MD   metoprolol succinate (TOPROL XL) 50 MG extended release tablet Take 1 tablet by mouth daily 1/7/20  Yes Jeremiah Rajput MD   atorvastatin (LIPITOR) 10 MG tablet Take 1 tablet by mouth daily 1/7/20  Yes color and erythema;     Neurovascular  Dorsalis pedis: 2+  Posterior tibial: absent  Saphenous nerve sensation: diminished  Tibial nerve sensation: diminished  Superficial peroneal nerve sensation: diminished  Deep peroneal nerve sensation: diminished  Sural nerve sensation: diminished      Left Foot/Ankle      Inspection and Palpation  Skin Exam: maceration, tinea, fissure, skin changes, abnormal color and erythema;     Neurovascular  Dorsalis pedis: 2+  Posterior tibial: absent  Saphenous nerve sensation: diminished  Tibial nerve sensation: diminished  Superficial peroneal nerve sensation: diminished  Deep peroneal nerve sensation: diminished  Sural nerve sensation: diminished          Ortho Exam  Toenail Description  Sites of Onychomycosis Involvement (Check affected area)  [x] [x] [x] [x] [x] [x] [x] [x] [x] [x]  5 4 3 2 1 1 2 3 4 5                          Right                                        Left    Thickness  [x] [x] [x] [x] [x] [x] [x] [x] [x] [x]  5 4 3 2 1 1 2 3 4 5                         Right                                        Left    Dystrophic Changes   [x] [x] [x] [x] [x] [x] [x] [x] [x] [x]  5 4 3 2 1 1 2 3 4 5                         Right                                        Left    discolored   [x] [x] [x] [x] [x] [x] [x] [x] [x] [x]  5 4 3 2 1 1 2 3 4 5                          Right                                        Left    Incurvation/Ingrowin   [] [] [x] [x] [x] [x] [x] [x] [x] []  5 4 3 2 1 1 2 3 4 5                         Right                                        Left    Inflammation/Pain   [x] [x] [x] [x] [x] [x] [x] [x] [x] [x]  5 4 3 2 1 1 2 3 4 5                         Right                                        Left    General: AAO x 3 in NAD. Dermatologic Exam: Pruritic rash with scaly moccasin type plantar aspect both feet  Skin lesion/ulceration Absent . Skin No rashes or nodules noted. .   Musculoskeletal:      , Bilateral.  CFT <3 seconds, Bilateral. Hair growth absent to the level of the digits, Bilateral.  Edema absent, Bilateral.  Varicosities present, Bilateral. Erythema absent, Bilateral   Sharp dull sensation decreased bilateral plantar aspect feet    Radiographs:       Asessment: Patient is a 72 y.o. male with:    Diagnosis Orders   1. Neuropathy     2. Tinea unguium     3. Pain in left toe(s)     4. Pain in toe of right foot     5. Peripheral vascular disease, unspecified ([facility] [address]     6. Dermatitis         Plan: New treatment of neuropathy we prescribed gabapentin oars report reviewedPt seen for  chronic pain   oarrs report reviewed I have reviewed current medication  and prior noted I believe the need for refill is warranted  I have reviewed oarrs report and found no drug seeking behavior   I have discussed all side effects and narcotic policy   with pt  pt understands and agrees with treatment  a follow up  appointment is made                 patient examined and evaluated. Current condition and treatment options discussed in detail. Discussed conservative and surgical options with the patient. Advised pt to . Verbal and written instructions given to patient. Contact office with any questions/problems/concerns. RTC in 3month(s). Debridement of all painful nails was performed with both manual and electrical debridement to prevent infection and/or ulceration. Patient tolerated the debridement well, without any complaints.   1-5 bilateral   Patient was asymptomatic after debridement   Update prescription of ketoconazole shampoo and cream were renewed continue using this twice a day  Electronically signed by Jennifer Baptiste DPM on 5/5/2020 at 3:42 PM  5/5/2020  New prescription for shampoo was given patient is to continue drying feet daily cotton shoes daily patient needs to use Tinactin powder

## 2020-06-24 ENCOUNTER — OFFICE VISIT (OUTPATIENT)
Dept: FAMILY MEDICINE CLINIC | Age: 70
End: 2020-06-24
Payer: MEDICARE

## 2020-06-24 ENCOUNTER — HOSPITAL ENCOUNTER (OUTPATIENT)
Age: 70
Discharge: HOME | End: 2020-06-26
Payer: MEDICARE

## 2020-06-24 VITALS
HEART RATE: 76 BPM | HEIGHT: 73 IN | DIASTOLIC BLOOD PRESSURE: 70 MMHG | BODY MASS INDEX: 30.88 KG/M2 | SYSTOLIC BLOOD PRESSURE: 128 MMHG | WEIGHT: 233 LBS | OXYGEN SATURATION: 97 %

## 2020-06-24 PROBLEM — Z79.01 CHRONIC ANTICOAGULATION: Status: ACTIVE | Noted: 2020-06-24

## 2020-06-24 PROBLEM — E66.09 CLASS 1 OBESITY DUE TO EXCESS CALORIES WITH SERIOUS COMORBIDITY AND BODY MASS INDEX (BMI) OF 30.0 TO 30.9 IN ADULT: Status: ACTIVE | Noted: 2020-06-24

## 2020-06-24 PROBLEM — E66.811 CLASS 1 OBESITY DUE TO EXCESS CALORIES WITH SERIOUS COMORBIDITY AND BODY MASS INDEX (BMI) OF 30.0 TO 30.9 IN ADULT: Status: ACTIVE | Noted: 2020-06-24

## 2020-06-24 PROBLEM — R73.03 PREDIABETES: Status: ACTIVE | Noted: 2020-06-24

## 2020-06-24 LAB
ALBUMIN SERPL-MCNC: 4.3 G/DL (ref 3.5–5.2)
ALP BLD-CCNC: 92 U/L (ref 40–129)
ALT SERPL-CCNC: 18 U/L (ref 0–40)
ANION GAP SERPL CALCULATED.3IONS-SCNC: 14 MMOL/L (ref 7–16)
AST SERPL-CCNC: 19 U/L (ref 0–39)
BASOPHILS ABSOLUTE: 0.04 E9/L (ref 0–0.2)
BASOPHILS RELATIVE PERCENT: 0.4 % (ref 0–2)
BILIRUB SERPL-MCNC: 0.3 MG/DL (ref 0–1.2)
BUN BLDV-MCNC: 23 MG/DL (ref 8–23)
CALCIUM SERPL-MCNC: 9.8 MG/DL (ref 8.6–10.2)
CHLORIDE BLD-SCNC: 100 MMOL/L (ref 98–107)
CHOLESTEROL, TOTAL: 173 MG/DL (ref 0–199)
CO2: 25 MMOL/L (ref 22–29)
CREAT SERPL-MCNC: 1.3 MG/DL (ref 0.7–1.2)
EOSINOPHILS ABSOLUTE: 0.03 E9/L (ref 0.05–0.5)
EOSINOPHILS RELATIVE PERCENT: 0.3 % (ref 0–6)
GFR AFRICAN AMERICAN: >60
GFR NON-AFRICAN AMERICAN: 55 ML/MIN/1.73
GLUCOSE BLD-MCNC: 105 MG/DL (ref 74–99)
HBA1C MFR BLD: 6.3 %
HCT VFR BLD CALC: 49.7 % (ref 37–54)
HDLC SERPL-MCNC: 44 MG/DL
HEMOGLOBIN: 15.9 G/DL (ref 12.5–16.5)
IMMATURE GRANULOCYTES #: 0.02 E9/L
IMMATURE GRANULOCYTES %: 0.2 % (ref 0–5)
LDL CHOLESTEROL CALCULATED: 86 MG/DL (ref 0–99)
LYMPHOCYTES ABSOLUTE: 2.98 E9/L (ref 1.5–4)
LYMPHOCYTES RELATIVE PERCENT: 29.9 % (ref 20–42)
MCH RBC QN AUTO: 29.1 PG (ref 26–35)
MCHC RBC AUTO-ENTMCNC: 32 % (ref 32–34.5)
MCV RBC AUTO: 90.9 FL (ref 80–99.9)
MONOCYTES ABSOLUTE: 0.76 E9/L (ref 0.1–0.95)
MONOCYTES RELATIVE PERCENT: 7.6 % (ref 2–12)
NEUTROPHILS ABSOLUTE: 6.15 E9/L (ref 1.8–7.3)
NEUTROPHILS RELATIVE PERCENT: 61.6 % (ref 43–80)
PDW BLD-RTO: 13.3 FL (ref 11.5–15)
PLATELET # BLD: 209 E9/L (ref 130–450)
PMV BLD AUTO: 11.7 FL (ref 7–12)
POTASSIUM SERPL-SCNC: 4.2 MMOL/L (ref 3.5–5)
PROSTATE SPECIFIC ANTIGEN: 0.57 NG/ML (ref 0–4)
RBC # BLD: 5.47 E12/L (ref 3.8–5.8)
SODIUM BLD-SCNC: 139 MMOL/L (ref 132–146)
TOTAL PROTEIN: 7.5 G/DL (ref 6.4–8.3)
TRIGL SERPL-MCNC: 216 MG/DL (ref 0–149)
VLDLC SERPL CALC-MCNC: 43 MG/DL
WBC # BLD: 10 E9/L (ref 4.5–11.5)

## 2020-06-24 PROCEDURE — 85025 COMPLETE CBC W/AUTO DIFF WBC: CPT

## 2020-06-24 PROCEDURE — 36415 COLL VENOUS BLD VENIPUNCTURE: CPT

## 2020-06-24 PROCEDURE — 80061 LIPID PANEL: CPT

## 2020-06-24 PROCEDURE — 80053 COMPREHEN METABOLIC PANEL: CPT

## 2020-06-24 PROCEDURE — G0103 PSA SCREENING: HCPCS

## 2020-06-24 PROCEDURE — 99215 OFFICE O/P EST HI 40 MIN: CPT | Performed by: INTERNAL MEDICINE

## 2020-06-24 PROCEDURE — 83036 HEMOGLOBIN GLYCOSYLATED A1C: CPT | Performed by: INTERNAL MEDICINE

## 2020-06-24 NOTE — PROGRESS NOTES
19  Itz Waddell : 1950 Sex: male  Age: 72 y.o. Chief Complaint   Patient presents with    Atrial Fibrillation     6 mo f/u       Patient comes in for complete examination. The patient is now been off of his prednisone for polymyalgia rheumatica for quite some time and he seems to be doing well without recurrent symptoms. Patient's blood pressure is well controlled. His blood sugars are increasing and he has gained 11 pounds over the last year. Patient is denying symptoms such as excessive thirst, urination, hunger. He has had no vision changes. Patient does have atrial fibrillation and is chronically anticoagulated on Eliquis. He has had no strokelike symptoms. He denies any other embolization. He is tolerated Eliquis without bleeding or bruising. He denies chest pain or chest pressure with exertion. Patient denies GI complaints including black tarry stools or blood in his stools. Nocturia x1. Hypertension     Hyperlipidemia         Review of Systems  PMH:  Health Maintenance:  Bone Density Test Screening - (2018)  Bone Density Scan - (2017)  Colonoscopy Screening - (10/16/2017)  Colonoscopy - (2016)  Colonoscopy - (10/16/2017)  Colonoscopy - (2013)  Influenza Vaccination - (2016)  Psa Test - (2020)  Prostate Exam - (2020)  Physical Exam - (2020)  Rectal Exam - (2020)  Influenza Vaccination - (10/3/2018)  EGD [facility] SEE REPORT  Zostavax - (2014) SLIP GIVEN  Prevnar Vaccine - (2016) SLIP GIVEN  Pneumonia Vaccination - (2017)  2D ECHO - (2018)  Shingrix Vaccine (Shingles) - SLIP GIVEN  MEDICAL PROBLEMS:  Hypertension. Hyperlipidemia. Glucose intolerance. Metabolic syndrome. Gastroesophageal reflux disease.   EROSIVE GASTRITIS - EGD- 10/10 [facility] OMEPRAZOLE STARTED  LEFT SHOULDER PAIN- REFERRED TO MICHELE- SUSPECT RCT  CARPAL TUNNEL- WRIST SPLINT GIVEN   RIGHT INGUINAL HERNIA- REFERRED TO LAQUITA   COLONIC

## 2020-07-06 RX ORDER — METOPROLOL SUCCINATE 50 MG/1
50 TABLET, EXTENDED RELEASE ORAL DAILY
Qty: 30 TABLET | Refills: 5 | Status: SHIPPED
Start: 2020-07-06 | End: 2020-10-14 | Stop reason: SDUPTHER

## 2020-07-13 ENCOUNTER — PROCEDURE VISIT (OUTPATIENT)
Dept: PODIATRY | Age: 70
End: 2020-07-13
Payer: MEDICARE

## 2020-07-13 VITALS
BODY MASS INDEX: 31.14 KG/M2 | HEIGHT: 73 IN | TEMPERATURE: 98.5 F | DIASTOLIC BLOOD PRESSURE: 82 MMHG | SYSTOLIC BLOOD PRESSURE: 126 MMHG | WEIGHT: 235 LBS

## 2020-07-13 PROCEDURE — 99213 OFFICE O/P EST LOW 20 MIN: CPT | Performed by: PODIATRIST

## 2020-07-13 PROCEDURE — 11721 DEBRIDE NAIL 6 OR MORE: CPT | Performed by: PODIATRIST

## 2020-07-13 RX ORDER — GABAPENTIN 100 MG/1
200 CAPSULE ORAL 3 TIMES DAILY
Qty: 270 CAPSULE | Refills: 1 | Status: SHIPPED
Start: 2020-07-13 | End: 2020-09-21 | Stop reason: SDUPTHER

## 2020-07-13 RX ORDER — ATORVASTATIN CALCIUM 10 MG/1
TABLET, FILM COATED ORAL
Qty: 90 TABLET | Refills: 1 | Status: SHIPPED
Start: 2020-07-13 | End: 2022-05-11 | Stop reason: SDUPTHER

## 2020-07-13 ASSESSMENT — ENCOUNTER SYMPTOMS
RESPIRATORY NEGATIVE: 1
GASTROINTESTINAL NEGATIVE: 1
EYES NEGATIVE: 1

## 2020-07-13 NOTE — TELEPHONE ENCOUNTER
Last Appointment:  6/24/2020  Future Appointments   Date Time Provider Mary Dillon   9/21/2020 10:45 AM Renny Quinonez DPM Col Podiatry [facility]

## 2020-07-13 NOTE — PROGRESS NOTES
[address] [place], [place] [id number]  Dept: [phone]  Dept Fax: [phone]     PATIENT PROGRESS NOTE  Date of patient's visit: 7/13/2020  Patient's Name:  Tre Forrest YOB: 1950            Patient Care Team:  Deborah Jennings MD as PCP - General (Internal Medicine)  Deborah Jennings MD as PCP - [facility] Empaneled Provider  Christal Arnold DPM as Consulting Physician (Podiatry)        Chief Complaint   Patient presents with    Toe Pain     saw pcp Dr. Yeni Ramirez on 6/24/2020         HPI:   Tre Forrest is a 72 y.o. male  Patient relates pain is Present. Rash is doing much better as well as pain has reduced in all 5 bilaterally. Currently denies F/C/N/V. Pt's primary care physician is Deborah Jennings MD last seen and is seen today for a new issue patient is having numbness in both feet this is been progressively going on for many years at this time is been getting very bad where he cannot sleep  New medicine not helping      No Known Allergies    No past medical history on file. Prior to Admission medications    Medication Sig Start Date End Date Taking? Authorizing Provider   gabapentin (NEURONTIN) 100 MG capsule Take 2 capsules by mouth 3 times daily for 90 days. Intended supply: 30 days 7/13/20 10/11/20 Yes Christal Arnold DPM   metoprolol succinate (TOPROL XL) 50 MG extended release tablet Take 1 tablet by mouth daily 7/6/20  Yes Deborah Jennings MD   apixaban (ELIQUIS) 5 MG TABS tablet Take 1 tablet by mouth 2 times daily 6/4/20  Yes Deborah Jennings MD   ketoconazole (NIZORAL) 2 % shampoo Apply topically daily as needed. 5/5/20  Yes Christal Arnold DPM   ketoconazole (NIZORAL) 2 % cream Apply topically daily.  5/5/20  Yes Christal Arnold DPM   amLODIPine-benazepril (LOTREL) 10-20 MG per capsule TAKE 1 CAPSULE DAILY 3/23/20  Yes Deborah Jennings MD   loratadine (CLARITIN) 10 MG tablet Take 1 tablet by mouth daily 3/6/20 Yes Loreta Goodman, APRN - CNP   atorvastatin (LIPITOR) 10 MG tablet Take 1 tablet by mouth daily 1/7/20  Yes Deborah Jennings MD   atorvastatin (LIPITOR) 10 MG tablet Take 1 tablet by mouth daily 1/7/20  Yes Deborah Jennings MD   hydrochlorothiazide (HYDRODIURIL) 25 MG tablet TAKE 1 TABLET DAILY 12/11/19  Yes Deborah Jennings MD   ibuprofen (ADVIL) 200 MG tablet Take 200 mg by mouth every 6 hours as needed for Pain   Yes Historical Provider, MD   omeprazole (PRILOSEC) 20 MG delayed release capsule TAKE 1 CAPSULE DAILY 10/8/19  Yes Deborah Jennings MD       No past surgical history on file. No family history on file. Social History     Tobacco Use    Smoking status: Never Smoker    Smokeless tobacco: Never Used   Substance Use Topics    Alcohol use: Not Currently       Review of Systems   Constitutional: Negative. HENT: Negative. Eyes: Negative. Respiratory: Negative. Cardiovascular: Negative. Gastrointestinal: Negative. Endocrine: Negative. Genitourinary: Negative. Musculoskeletal: Negative. Hematological: Negative. Review of Systems:   History obtained from chart review and the patient  General ROS: negative for - chills, fatigue, fever, night sweats or weight gain  Constitutional: Negative for chills, diaphoresis, fatigue, fever and unexpected weight change. Musculoskeletal: Positive for pain with papulation   . Neurological ROS: negative for - behavioral changes, confusion, headaches or seizures. Negative for weakness and numbness. Dermatological ROS: negative for - mole changes, rash  Cardiovascular: Negative for leg swelling. Gastrointestinal: Negative for constipation, diarrhea, nausea and vomiting.                Lower Extremity Physical Examination:   Vitals:   Vitals:    07/13/20 1119   BP: 126/82   Temp: 98.5 °F (36.9 °C)        Foot Exam    General  General Appearance: appears stated age and healthy   Orientation: alert and oriented to person, place, and time Right Foot/Ankle     Inspection and Palpation  Skin Exam: maceration, tinea, fissure, skin changes, abnormal color and erythema;     Neurovascular  Dorsalis pedis: 2+  Posterior tibial: absent  Saphenous nerve sensation: diminished  Tibial nerve sensation: diminished  Superficial peroneal nerve sensation: diminished  Deep peroneal nerve sensation: diminished  Sural nerve sensation: diminished      Left Foot/Ankle      Inspection and Palpation  Skin Exam: maceration, tinea, fissure, skin changes, abnormal color and erythema;     Neurovascular  Dorsalis pedis: 2+  Posterior tibial: absent  Saphenous nerve sensation: diminished  Tibial nerve sensation: diminished  Superficial peroneal nerve sensation: diminished  Deep peroneal nerve sensation: diminished  Sural nerve sensation: diminished          Ortho Exam  Toenail Description  Sites of Onychomycosis Involvement (Check affected area)  [x] [x] [x] [x] [x] [x] [x] [x] [x] [x]  5 4 3 2 1 1 2 3 4 5                          Right                                        Left    Thickness  [x] [x] [x] [x] [x] [x] [x] [x] [x] [x]  5 4 3 2 1 1 2 3 4 5                         Right                                        Left    Dystrophic Changes   [x] [x] [x] [x] [x] [x] [x] [x] [x] [x]  5 4 3 2 1 1 2 3 4 5                         Right                                        Left    discolored   [x] [x] [x] [x] [x] [x] [x] [x] [x] [x]  5 4 3 2 1 1 2 3 4 5                          Right                                        Left    Incurvation/Ingrowin   [] [] [x] [x] [x] [x] [x] [x] [x] []  5 4 3 2 1 1 2 3 4 5                         Right                                        Left    Inflammation/Pain   [x] [x] [x] [x] [x] [x] [x] [x] [x] [x]  5 4 3 2 1 1 2 3 4 5                         Right                                        Left    General: AAO x 3 in NAD. Dermatologic Exam:  Skin lesion/ulceration Absent . Skin No rashes or nodules noted. Samaria Ponce Musculoskeletal:      , Bilateral.  CFT <3 seconds, Bilateral.  Hair growth absent to the level of the digits, Bilateral.  Edema absent, Bilateral.  Varicosities present, Bilateral. Erythema absent, Bilateral   Sharp dull sensation decreased bilateral plantar aspect feet    Radiographs:       Asessment: Patient is a 72 y.o. male with:    Diagnosis Orders   1. Neuropathy     2. Tinea unguium     3. Pain in left toe(s)     4. Pain in toe of right foot     5. Peripheral vascular disease, unspecified ([facility]         Plan: Pt seen for  chronic pain   oarrs report reviewed I have reviewed current medication  and prior noted I believe the need for refill is warranted  I have reviewed oarrs report and found no drug seeking behavior   I have discussed all side effects and narcotic policy   with pt  pt understands and agrees with treatment  a follow up  appointment is made . Current condition and treatment options discussed in detail. Discussed conservative and surgical options with the patient. Advised pt to . Verbal and written instructions given to patient. Contact office with any questions/problems/concerns. RTC in 3month(s). Debridement of all painful nails was performed with both manual and electrical debridement to prevent infection and/or ulceration. Patient tolerated the debridement well, without any complaints.   1-5 bilateral   Patient was asymptomatic after debridement     New rx sent gabapentin 200 tid      Electronically signed by Maria T Lnadis DPM on 7/13/2020 at 11:29 AM  7/13/2020  New prescription for shampoo was given patient is to continue drying feet daily cotton shoes daily patient needs to use Tinactin powder

## 2020-07-24 PROBLEM — Z12.5 PROSTATE CANCER SCREENING: Status: RESOLVED | Noted: 2019-06-14 | Resolved: 2020-07-24

## 2020-09-18 RX ORDER — AMLODIPINE BESYLATE AND BENAZEPRIL HYDROCHLORIDE 10; 20 MG/1; MG/1
CAPSULE ORAL
Qty: 90 CAPSULE | Refills: 3 | Status: SHIPPED
Start: 2020-09-18 | End: 2021-09-13

## 2020-09-18 NOTE — TELEPHONE ENCOUNTER
Last Appointment:  6/24/2020  Future Appointments   Date Time Provider Mary Dillon   9/21/2020 10:45 AM Johnathon Bernheim, DPM Col Podiatry [facility]

## 2020-09-21 ENCOUNTER — PROCEDURE VISIT (OUTPATIENT)
Dept: PODIATRY | Age: 70
End: 2020-09-21
Payer: MEDICARE

## 2020-09-21 VITALS
TEMPERATURE: 97.3 F | WEIGHT: 231 LBS | HEIGHT: 73 IN | DIASTOLIC BLOOD PRESSURE: 82 MMHG | SYSTOLIC BLOOD PRESSURE: 123 MMHG | BODY MASS INDEX: 30.62 KG/M2

## 2020-09-21 PROCEDURE — 99213 OFFICE O/P EST LOW 20 MIN: CPT | Performed by: PODIATRIST

## 2020-09-21 PROCEDURE — 11721 DEBRIDE NAIL 6 OR MORE: CPT | Performed by: PODIATRIST

## 2020-09-21 RX ORDER — GABAPENTIN 100 MG/1
200 CAPSULE ORAL 3 TIMES DAILY
Qty: 270 CAPSULE | Refills: 1 | Status: SHIPPED
Start: 2020-09-21 | End: 2020-12-21 | Stop reason: SDUPTHER

## 2020-09-21 ASSESSMENT — ENCOUNTER SYMPTOMS
GASTROINTESTINAL NEGATIVE: 1
EYES NEGATIVE: 1
RESPIRATORY NEGATIVE: 1

## 2020-09-21 NOTE — PROGRESS NOTES
[address] [place] [address]  Dept: [phone]  Dept Fax: [phone]    Diabetic  PATIENT PROGRESS NOTE  Date of patient's visit: 9/21/2020  Patient's Name:  Isaac Diaz YOB: 1950            Patient Care Team:  Yumiko Vo MD as PCP - General (Internal Medicine)  Yumiko Vo MD as PCP - [facility] Empaneled Provider  Amie Colon DPM as Consulting Physician (Podiatry)        Chief Complaint   Patient presents with    Toe Pain     saw pcp Dr. Carlos Huber on 6/24/2020         HPI:   Isaac Diaz is a 72 y.o. male  Patient relates pain is Present. Rash is doing much better as well as pain has reduced in all 5 bilaterally. Currently denies F/C/N/V. Pt's primary care physician is Yumiko Vo MD last seen and is seen today for a new issue patient is having numbness in both feet this is been progressively going on for many years at this time is been getting very bad where he cannot sleep n gabapentin doing better      No Known Allergies    No past medical history on file. Prior to Admission medications    Medication Sig Start Date End Date Taking? Authorizing Provider   gabapentin (NEURONTIN) 100 MG capsule Take 2 capsules by mouth 3 times daily for 90 days. Intended supply: 30 days 9/21/20 12/20/20 Yes Amie Colon DPM   amLODIPine-benazepril (LOTREL) 10-20 MG per capsule TAKE 1 CAPSULE DAILY 9/18/20  Yes Yumiko Vo MD   atorvastatin (LIPITOR) 10 MG tablet TAKE 1 TABLET DAILY 7/13/20  Yes Yumiko Vo MD   metoprolol succinate (TOPROL XL) 50 MG extended release tablet Take 1 tablet by mouth daily 7/6/20  Yes Yumiko Vo MD   apixaban (ELIQUIS) 5 MG TABS tablet Take 1 tablet by mouth 2 times daily 6/4/20  Yes Yumiko Vo MD   ketoconazole (NIZORAL) 2 % shampoo Apply topically daily as needed. 5/5/20  Yes Amie Colon DPM   ketoconazole (NIZORAL) 2 % cream Apply topically daily.  5/5/20 negative    Sensory exam:  Monofilament sensation: abnormal -   (minimum of 5 random plantar locations tested, avoiding callused areas - > 1 area with absence of sensation is + for neuropathy)    Plus at least one of the following:  Pulses: ,   Pinprick: Impaired  Proprioception: Impaired  Vibration (128 Hz): Intact  General: AAO x 3 in NAD. Dermatologic Exam:  Skin lesion/ulceration Absent . Skin No rashes or nodules noted. .   Musculoskeletal:      , Bilateral.  CFT <3 seconds, Bilateral.  Hair growth absent to the level of the digits, Bilateral.  Edema absent, Bilateral.  Varicosities present, Bilateral. Erythema absent, Bilateral   Sharp dull sensation decreased bilateral plantar aspect feet    Radiographs:       Asessment: Patient is a 72 y.o. male with:    Diagnosis Orders   1. Tinea unguium     2. Prediabetes     3. Pain in left toe(s)     4. Pain in toe of right foot     5. Peripheral vascular disease, unspecified ([facility] [address]     6. Neuropathy         Plan: Pt seen for  chronic pain   oarrs report reviewed I have reviewed current medication  and prior noted I believe the need for refill is warranted  I have reviewed oarrs report and found no drug seeking behavior   I have discussed all side effects and narcotic policy   with pt  pt understands and agrees with treatment  a follow up  appointment is made . Current condition and treatment options discussed in detail. Discussed conservative and surgical options with the patient. Advised pt to . Verbal and written instructions given to patient. Contact office with any questions/problems/concerns. RTC in 3month(s). Debridement of all painful nails was performed with both manual and electrical debridement to prevent infection and/or ulceration. Patient tolerated the debridement well, without any complaints.   1-5 bilateral   Patient was asymptomatic after debridement     New rx sent gabapentin 200 tid      Electronically signed by Sanchez Naidu DPM on 9/21/2020 at 11:01 AM  9/21/2020  New prescription for shampoo was given patient is to continue drying feet daily cotton shoes daily patient needs to use Tinactin powder

## 2020-10-14 ENCOUNTER — OFFICE VISIT (OUTPATIENT)
Dept: FAMILY MEDICINE CLINIC | Age: 70
End: 2020-10-14
Payer: MEDICARE

## 2020-10-14 ENCOUNTER — HOSPITAL ENCOUNTER (OUTPATIENT)
Age: 70
Discharge: HOME | End: 2020-10-16
Payer: MEDICARE

## 2020-10-14 VITALS
BODY MASS INDEX: 30.62 KG/M2 | HEART RATE: 59 BPM | HEIGHT: 73 IN | OXYGEN SATURATION: 98 % | WEIGHT: 231 LBS | DIASTOLIC BLOOD PRESSURE: 72 MMHG | SYSTOLIC BLOOD PRESSURE: 118 MMHG

## 2020-10-14 PROBLEM — M35.3 PMR (POLYMYALGIA RHEUMATICA) (HCC): Status: RESOLVED | Noted: 2019-06-24 | Resolved: 2020-10-14

## 2020-10-14 LAB
ALBUMIN SERPL-MCNC: 4.3 G/DL (ref 3.5–5.2)
ALP BLD-CCNC: 82 U/L (ref 40–129)
ALT SERPL-CCNC: 14 U/L (ref 0–40)
ANION GAP SERPL CALCULATED.3IONS-SCNC: 13 MMOL/L (ref 7–16)
AST SERPL-CCNC: 16 U/L (ref 0–39)
BILIRUB SERPL-MCNC: 0.5 MG/DL (ref 0–1.2)
BUN BLDV-MCNC: 24 MG/DL (ref 8–23)
CALCIUM SERPL-MCNC: 9.5 MG/DL (ref 8.6–10.2)
CHLORIDE BLD-SCNC: 99 MMOL/L (ref 98–107)
CO2: 26 MMOL/L (ref 22–29)
CREAT SERPL-MCNC: 1.3 MG/DL (ref 0.7–1.2)
GFR AFRICAN AMERICAN: >60
GFR NON-AFRICAN AMERICAN: 55 ML/MIN/1.73
GLUCOSE BLD-MCNC: 85 MG/DL (ref 74–99)
HBA1C MFR BLD: 6 %
MAGNESIUM: 2 MG/DL (ref 1.6–2.6)
POTASSIUM SERPL-SCNC: 4.4 MMOL/L (ref 3.5–5)
SODIUM BLD-SCNC: 138 MMOL/L (ref 132–146)
TOTAL PROTEIN: 7.3 G/DL (ref 6.4–8.3)

## 2020-10-14 PROCEDURE — G0008 ADMIN INFLUENZA VIRUS VAC: HCPCS | Performed by: INTERNAL MEDICINE

## 2020-10-14 PROCEDURE — 83036 HEMOGLOBIN GLYCOSYLATED A1C: CPT | Performed by: INTERNAL MEDICINE

## 2020-10-14 PROCEDURE — 36415 COLL VENOUS BLD VENIPUNCTURE: CPT

## 2020-10-14 PROCEDURE — 99213 OFFICE O/P EST LOW 20 MIN: CPT | Performed by: INTERNAL MEDICINE

## 2020-10-14 PROCEDURE — 90694 VACC AIIV4 NO PRSRV 0.5ML IM: CPT | Performed by: INTERNAL MEDICINE

## 2020-10-14 PROCEDURE — 80053 COMPREHEN METABOLIC PANEL: CPT

## 2020-10-14 PROCEDURE — 83735 ASSAY OF MAGNESIUM: CPT

## 2020-10-14 RX ORDER — METOPROLOL SUCCINATE 50 MG/1
50 TABLET, EXTENDED RELEASE ORAL DAILY
Qty: 90 TABLET | Refills: 4 | Status: SHIPPED
Start: 2020-10-14 | End: 2021-02-04 | Stop reason: SDUPTHER

## 2020-10-14 RX ORDER — HYDROCHLOROTHIAZIDE 25 MG/1
TABLET ORAL
Qty: 90 TABLET | Refills: 4 | Status: SHIPPED
Start: 2020-10-14 | End: 2021-10-25

## 2020-10-14 RX ORDER — ATORVASTATIN CALCIUM 10 MG/1
10 TABLET, FILM COATED ORAL DAILY
Qty: 90 TABLET | Refills: 4 | Status: SHIPPED
Start: 2020-10-14 | End: 2021-11-24

## 2020-10-14 RX ORDER — OMEPRAZOLE 20 MG/1
CAPSULE, DELAYED RELEASE ORAL
Qty: 90 CAPSULE | Refills: 4 | Status: SHIPPED
Start: 2020-10-14 | End: 2021-09-14 | Stop reason: SDUPTHER

## 2020-10-14 NOTE — PROGRESS NOTES
19  Zack Self : 1950 Sex: male  Age: 72 y.o. Chief Complaint   Patient presents with    Blood Sugar Problem     6 mo f/u       Patient has not lost weight asked requested but his A1c did decrease. I still asked him to lose 2 pounds per month. His hemoglobin A1c is bordering on diabetic range. Blood pressure here is well controlled. Patient denies cardiac or respiratory symptoms. He denies recurrent symptoms of polymyalgia rheumatica. He denies any bleeding or bruising with the use of Eliquis. I have requested that he get a flu shot. Hypertension     Hyperlipidemia         Review of Systems  PMH:  Health Maintenance:  Bone Density Test Screening - (2018)  Bone Density Scan - (2017)  Colonoscopy Screening - (10/16/2017)  Colonoscopy - (2016)  Colonoscopy - (10/16/2017)  Colonoscopy - (2013)  Influenza Vaccination - (2016)  Psa Test - (2020)  Prostate Exam - (2020)  Physical Exam - (2020)  Rectal Exam - (2020)  Influenza Vaccination - (10/3/2018)  EGD - [facility] SEE REPORT  Zostavax - (2014) SLIP GIVEN  Prevnar Vaccine - (2016) SLIP GIVEN  Pneumonia Vaccination - (2017)  2D ECHO - (2018)  Shingrix Vaccine (Shingles) - SLIP GIVEN  MEDICAL PROBLEMS:  Hypertension. Hyperlipidemia. Glucose intolerance. Metabolic syndrome. Gastroesophageal reflux disease.   EROSIVE GASTRITIS - EGD- 10/10 [facility] OMEPRAZOLE STARTED  LEFT SHOULDER PAIN- REFERRED TO MICHELE- SUSPECT RCT  CARPAL TUNNEL- WRIST SPLINT GIVEN   RIGHT INGUINAL HERNIA- REFERRED TO LAQUITA   COLONIC POLYPS   DENTAL CARIES/EROSION-REMINDED EACH VISIT TO OBTAIN DENTAL EVALUATION  PROXIMAL MUSCLE PAIN [address]-PREDNISONE STARTED-PMR   AORTIC STENOSIS/PALPITATIONS/DIASTOLIC DYSFUNCTION- REFER TO CARDIOLOGY 3/21/18  Tachycardia- 2019 holter ordered-ATRIAL FIB with rapid ventricular response-  Surgical Hx:  Appendectomy, Tonsillectomy W/ tablet, Take 1 tablet by mouth daily (Patient not taking: Reported on 10/14/2020), Disp: 30 tablet, Rfl: 0    ibuprofen (ADVIL) 200 MG tablet, Take 200 mg by mouth every 6 hours as needed for Pain, Disp: , Rfl:   No Known Allergies    No past medical history on file. No past surgical history on file. No family history on file. Social History     Socioeconomic History    Marital status:      Spouse name: Not on file    Number of children: Not on file    Years of education: Not on file    Highest education level: Not on file   Occupational History    Not on file   Social Needs    Financial resource strain: Not on file    Food insecurity     Worry: Not on file     Inability: Not on file    Transportation needs     Medical: Not on file     Non-medical: Not on file   Tobacco Use    Smoking status: Never Smoker    Smokeless tobacco: Never Used   Substance and Sexual Activity    Alcohol use: Not Currently    Drug use: Never    Sexual activity: Yes     Partners: Female   Lifestyle    Physical activity     Days per week: Not on file     Minutes per session: Not on file    Stress: Not on file   Relationships    Social connections     Talks on phone: Not on file     Gets together: Not on file     Attends Mu-ism service: Not on file     Active member of club or organization: Not on file     Attends meetings of clubs or organizations: Not on file     Relationship status: Not on file    Intimate partner violence     Fear of current or ex partner: Not on file     Emotionally abused: Not on file     Physically abused: Not on file     Forced sexual activity: Not on file   Other Topics Concern    Not on file   Social History Narrative    Not on file       Vitals:    10/14/20 1442   BP: 118/72   Pulse: 59   SpO2: 98%   Weight: 231 lb (104.8 kg)   Height: 6' 1\" (1.854 m)       Physical Exam  Exam:  Const: Appears healthy,well developed and well nourished. Appears obese. Eyes: EOMI in both eyes. PERRL.  ENMT: External canals are clear and dry. Tympanic membranes are intact. External nose WNL. Neck: Supple and symmetric. Palpation reveals no adenopathy. No masses appreciated. Thyroid  exhibits no nodules or thyromegaly. No JVD. Resp: Respirations are unlabored. Respiration rate is normal. Auscultate good airflow. No rales,  rhonchi or wheezes appreciated over the lungs bilaterally. CV: Rhythm is regular. S1 is normal. S2 is normal. No S4 gallop. Grade 3/6, systolic murmur. Carotids: no bruits. Abdominal aorta is not palpable. Pedal pulses: 2+ and equal bilaterally. Extremities: No clubbing, cyanosis or edema. Abdomen: Bowel sounds are normoactive. Palpation of the abdomen reveals softness, but no  distension, organomegaly or tenderness. No abdominal masses. No palpable hepatosplenomegaly. Musculo: Walks with a normal gait. Upper Extremities: ROM is physiologic. Lower Extremities: Full  ROM bilaterally. Skin: Dry and warm with no rash. Neuro: Alert and oriented x3. Mood is normal. Affect is normal. Speech is articulate and fluent. Reflexes: DTR's are intact, symmetric and 2+ bilaterally. Psych: Patient's attitude is cooperative. Patient's affect is appropriate. Judgement is realistic. Insight  is appropriate. Nicole Church was seen today for blood sugar problem.     Diagnoses and all orders for this visit:    Glucose intolerance    Essential hypertension    Paroxysmal atrial fibrillation (HCC)    Prediabetes  -     POCT glycosylated hemoglobin (Hb A1C)    Chronic anticoagulation    Class 1 obesity due to excess calories with serious comorbidity and body mass index (BMI) of 30.0 to 30.9 in adult    Other orders  -     INFLUENZA, QUADV, ADJUVANTED, 65 YRS =, IM, PF, PREFILL SYR, 0.5ML (FLUAD)

## 2020-11-23 RX ORDER — KETOCONAZOLE 20 MG/ML
SHAMPOO TOPICAL
Qty: 100 ML | Refills: 1 | Status: SHIPPED
Start: 2020-11-23 | End: 2021-03-22 | Stop reason: SDUPTHER

## 2020-11-23 NOTE — TELEPHONE ENCOUNTER
Last Appointment:  10/14/2020  Future Appointments   Date Time Provider Mary Campbellisti   12/21/2020 11:00 AM Shweta Ventura DPM Col Podiatry [facility]   4/14/2021 11:30 AM MD ALDO Hopper [facility]      Pt asking for refill on his shampoo

## 2020-12-21 ENCOUNTER — PROCEDURE VISIT (OUTPATIENT)
Dept: PODIATRY | Age: 70
End: 2020-12-21
Payer: MEDICARE

## 2020-12-21 VITALS
SYSTOLIC BLOOD PRESSURE: 112 MMHG | HEIGHT: 73 IN | TEMPERATURE: 98.8 F | BODY MASS INDEX: 32.47 KG/M2 | DIASTOLIC BLOOD PRESSURE: 68 MMHG | WEIGHT: 245 LBS

## 2020-12-21 PROCEDURE — 11721 DEBRIDE NAIL 6 OR MORE: CPT | Performed by: PODIATRIST

## 2020-12-21 PROCEDURE — 99213 OFFICE O/P EST LOW 20 MIN: CPT | Performed by: PODIATRIST

## 2020-12-21 RX ORDER — GABAPENTIN 100 MG/1
300 CAPSULE ORAL 3 TIMES DAILY
Qty: 810 CAPSULE | Refills: 0 | Status: SHIPPED
Start: 2020-12-21 | End: 2021-03-22

## 2020-12-21 ASSESSMENT — ENCOUNTER SYMPTOMS
GASTROINTESTINAL NEGATIVE: 1
RESPIRATORY NEGATIVE: 1
EYES NEGATIVE: 1

## 2020-12-21 NOTE — PROGRESS NOTES
[address] [place] [address]  Dept: [phone]  Dept Fax: [phone]    Diabetic  PATIENT PROGRESS NOTE  Date of patient's visit: 12/21/2020  Patient's Name:  Emmanuel Cabrera YOB: 1950            Patient Care Team:  Dustin Swann MD as PCP - General (Internal Medicine)  Dustin Swann MD as PCP - REHABILITATION [facility] Empaneled Provider  Ariella Mosley DPM as Consulting Physician (Podiatry)        Chief Complaint   Patient presents with    Toe Pain     saw pcp Dr. Ish Toribio on 10/14/2020         HPI:   Emmanuel Cabrera is a 72 y.o. male  Patient relates pain is Present. Rash is doing much better as well as pain has reduced in all 5 bilaterally. Currently denies F/C/N/V. Pt's primary care physician is Dustin Swann MD last seen and is seen today for a new issue patient is having numbness in both feet this is been progressively going on for many years at this time is been getting very bad where he cannot sleep n gabapentin doing better      No Known Allergies    No past medical history on file. Prior to Admission medications    Medication Sig Start Date End Date Taking? Authorizing Provider   gabapentin (NEURONTIN) 100 MG capsule Take 3 capsules by mouth 3 times daily for 90 days. Intended supply: 30 days 12/21/20 3/21/21 Yes Ariella Mosley DPM   ketoconazole (NIZORAL) 2 % shampoo Apply topically daily as needed.  11/23/20  Yes Dustin Swann MD   apixaban (ELIQUIS) 5 MG TABS tablet Take 1 tablet by mouth 2 times daily 10/14/20  Yes Dustin Swann MD   atorvastatin (LIPITOR) 10 MG tablet Take 1 tablet by mouth daily 10/14/20  Yes Dustin Swann MD   hydroCHLOROthiazide (HYDRODIURIL) 25 MG tablet TAKE 1 TABLET DAILY 10/14/20  Yes Dustin Swann MD   omeprazole (PRILOSEC) 20 MG delayed release capsule TAKE 1 CAPSULE DAILY 10/14/20  Yes Dustin Swann MD   metoprolol succinate (TOPROL XL) 50 MG extended release tablet Take 1 tablet by mouth daily 10/14/20  Yes Lee Ann Briscoe MD   amLODIPine-benazepril (LOTREL) 10-20 MG per capsule TAKE 1 CAPSULE DAILY 9/18/20  Yes Lee Ann Briscoe MD   atorvastatin (LIPITOR) 10 MG tablet TAKE 1 TABLET DAILY 7/13/20  Yes Lee Ann Briscoe MD   ketoconazole (NIZORAL) 2 % cream Apply topically daily. 5/5/20  Yes Janny Velez DPM   loratadine (CLARITIN) 10 MG tablet Take 1 tablet by mouth daily 3/6/20  Yes CECELIA Llanos - CNP   ibuprofen (ADVIL) 200 MG tablet Take 200 mg by mouth every 6 hours as needed for Pain   Yes Historical Provider, MD       No past surgical history on file. No family history on file. Social History     Tobacco Use    Smoking status: Never Smoker    Smokeless tobacco: Never Used   Substance Use Topics    Alcohol use: Not Currently       Review of Systems   Constitutional: Negative. HENT: Negative. Eyes: Negative. Respiratory: Negative. Cardiovascular: Negative. Gastrointestinal: Negative. Endocrine: Negative. Genitourinary: Negative. Musculoskeletal: Negative. Hematological: Negative. Review of Systems:   History obtained from chart review and the patient  General ROS: negative for - chills, fatigue, fever, night sweats or weight gain  Constitutional: Negative for chills, diaphoresis, fatigue, fever and unexpected weight change. Musculoskeletal: Positive for pain with papulation   . Neurological ROS: negative for - behavioral changes, confusion, headaches or seizures. Negative for weakness and numbness. Dermatological ROS: negative for - mole changes, rash  Cardiovascular: Negative for leg swelling. Gastrointestinal: Negative for constipation, diarrhea, nausea and vomiting.                Lower Extremity Physical Examination:   Vitals:   Vitals:    12/21/20 1108   BP: 112/68   Temp: 98.8 °F (37.1 °C)        Foot Exam    General  General Appearance: appears stated age and healthy   Orientation: alert and oriented to person, place, and time       Right Foot/Ankle     Inspection and Palpation  Skin Exam: maceration, tinea, fissure, skin changes, abnormal color and erythema;     Neurovascular  Dorsalis pedis: 2+  Posterior tibial: absent  Saphenous nerve sensation: diminished  Tibial nerve sensation: diminished  Superficial peroneal nerve sensation: diminished  Deep peroneal nerve sensation: diminished  Sural nerve sensation: diminished      Left Foot/Ankle      Inspection and Palpation  Skin Exam: maceration, tinea, fissure, skin changes, abnormal color and erythema;     Neurovascular  Dorsalis pedis: 2+  Posterior tibial: absent  Saphenous nerve sensation: diminished  Tibial nerve sensation: diminished  Superficial peroneal nerve sensation: diminished  Deep peroneal nerve sensation: diminished  Sural nerve sensation: diminished          Ortho Exam  Toenail Description  Sites of Onychomycosis Involvement (Check affected area)  [x] [x] [x] [x] [x] [x] [x] [x] [x] [x]  5 4 3 2 1 1 2 3 4 5                          Right                                        Left    Thickness  [x] [x] [x] [x] [x] [x] [x] [x] [x] [x]  5 4 3 2 1 1 2 3 4 5                         Right                                        Left    Dystrophic Changes   [x] [x] [x] [x] [x] [x] [x] [x] [x] [x]  5 4 3 2 1 1 2 3 4 5                         Right                                        Left    discolored   [x] [x] [x] [x] [x] [x] [x] [x] [x] [x]  5 4 3 2 1 1 2 3 4 5                          Right                                        Left    Incurvation/Ingrowin   [] [] [x] [x] [x] [x] [x] [x] [x] []  5 4 3 2 1 1 2 3 4 5                         Right                                        Left    Inflammation/Pain   [x] [x] [x] [x] [x] [x] [x] [x] [x] [x]  5 4 3 2 1 1 2 3 4 5                         Right                                        Left  Visual inspection:  Deformity/amputation: absent  Skin lesions/pre-ulcerative calluses: absent  Edema: right- negative, left- negative    Sensory exam:  Monofilament sensation: abnormal -   (minimum of 5 random plantar locations tested, avoiding callused areas - > 1 area with absence of sensation is + for neuropathy)    Plus at least one of the following:  Pulses: ,   Pinprick: Impaired  Proprioception: Impaired  Vibration (128 Hz): Intact  General: AAO x 3 in NAD. Dermatologic Exam:  Skin lesion/ulceration Absent . Skin No rashes or nodules noted. .   Musculoskeletal:      , Bilateral.  CFT <3 seconds, Bilateral.  Hair growth absent to the level of the digits, Bilateral.  Edema absent, Bilateral.  Varicosities present, Bilateral. Erythema absent, Bilateral   Sharp dull sensation decreased bilateral plantar aspect feet    Radiographs:       Asessment: Patient is a 72 y.o. male with:    Diagnosis Orders   1. Tinea unguium     2. Prediabetes     3. Pain in left toe(s)     4. Pain in toe of right foot     5. Peripheral vascular disease, unspecified ([facility] [address]     6. Neuropathy         Plan: Pt seen for  chronic pain   oarrs report reviewed I have reviewed current medication  and prior noted I believe the need for refill is warranted  I have reviewed oarrs report and found no drug seeking behavior   I have discussed all side effects and narcotic policy   with pt  pt understands and agrees with treatment  a follow up  appointment is made . Current condition and treatment options discussed in detail. Discussed conservative and surgical options with the patient. Advised pt to . Verbal and written instructions given to patient. Contact office with any questions/problems/concerns. RTC in 3month(s). Debridement of all painful nails was performed with both manual and electrical debridement to prevent infection and/or ulceration. Patient tolerated the debridement well, without any complaints.   1-5 bilateral   Patient was asymptomatic after debridement     New rx sent gabapentin 300  tid      Electronically signed by Marquis J Luis DPM on

## 2021-02-04 DIAGNOSIS — I48.0 PAROXYSMAL ATRIAL FIBRILLATION (HCC): ICD-10-CM

## 2021-02-04 RX ORDER — METOPROLOL SUCCINATE 50 MG/1
50 TABLET, EXTENDED RELEASE ORAL DAILY
Qty: 90 TABLET | Refills: 3 | Status: SHIPPED
Start: 2021-02-04 | End: 2022-02-01 | Stop reason: SDUPTHER

## 2021-02-04 NOTE — TELEPHONE ENCOUNTER
Last Appointment:  10/14/2020  Future Appointments   Date Time Provider Mary Garciai   3/22/2021 11:30 AM Matthieu Forrest DPM Col Podiatry [facility]   4/14/2021 11:30 AM Yarelis Powell  [address] [place]      Patient does not have enough metoprolol til appt

## 2021-03-11 ENCOUNTER — IMMUNIZATION (OUTPATIENT)
Dept: PRIMARY CARE CLINIC | Age: 71
End: 2021-03-11
Payer: MEDICARE

## 2021-03-11 PROCEDURE — 0011A COVID-19, MODERNA VACCINE 100MCG/0.5ML DOSE: CPT | Performed by: PHYSICIAN ASSISTANT

## 2021-03-11 PROCEDURE — 91301 COVID-19, MODERNA VACCINE 100MCG/0.5ML DOSE: CPT | Performed by: PHYSICIAN ASSISTANT

## 2021-03-22 ENCOUNTER — PROCEDURE VISIT (OUTPATIENT)
Dept: PODIATRY | Age: 71
End: 2021-03-22
Payer: MEDICARE

## 2021-03-22 VITALS
SYSTOLIC BLOOD PRESSURE: 122 MMHG | TEMPERATURE: 98.2 F | BODY MASS INDEX: 30.08 KG/M2 | WEIGHT: 228 LBS | DIASTOLIC BLOOD PRESSURE: 80 MMHG

## 2021-03-22 DIAGNOSIS — B35.1 TINEA UNGUIUM: ICD-10-CM

## 2021-03-22 DIAGNOSIS — M79.675 PAIN IN LEFT TOE(S): ICD-10-CM

## 2021-03-22 DIAGNOSIS — R73.03 PREDIABETES: ICD-10-CM

## 2021-03-22 DIAGNOSIS — G62.9 NEUROPATHY: ICD-10-CM

## 2021-03-22 DIAGNOSIS — L30.9 DERMATITIS: Primary | ICD-10-CM

## 2021-03-22 DIAGNOSIS — I73.9 PERIPHERAL VASCULAR DISEASE, UNSPECIFIED (HCC): ICD-10-CM

## 2021-03-22 PROCEDURE — 99212 OFFICE O/P EST SF 10 MIN: CPT | Performed by: PODIATRIST

## 2021-03-22 PROCEDURE — 11721 DEBRIDE NAIL 6 OR MORE: CPT | Performed by: PODIATRIST

## 2021-03-22 RX ORDER — GABAPENTIN 100 MG/1
300 CAPSULE ORAL 3 TIMES DAILY
Qty: 810 CAPSULE | Refills: 0 | Status: SHIPPED
Start: 2021-03-22 | End: 2021-06-21 | Stop reason: SDUPTHER

## 2021-03-22 RX ORDER — KETOCONAZOLE 20 MG/ML
SHAMPOO TOPICAL
Qty: 100 ML | Refills: 5 | Status: SHIPPED
Start: 2021-03-22 | End: 2022-06-27 | Stop reason: SDUPTHER

## 2021-03-22 RX ORDER — KETOCONAZOLE 20 MG/G
CREAM TOPICAL
Qty: 30 G | Refills: 5 | Status: SHIPPED
Start: 2021-03-22 | End: 2022-06-27 | Stop reason: SDUPTHER

## 2021-03-22 ASSESSMENT — ENCOUNTER SYMPTOMS: GASTROINTESTINAL NEGATIVE: 1

## 2021-03-22 NOTE — PROGRESS NOTES
[address] [place], [place] [address]   Dept: [phone]  Dept Fax: [phone]    Diabetic  PATIENT PROGRESS NOTE  Date of patient's visit: 3/22/2021  Patient's Name:  Breana Soler YOB: 1950            Patient Care Team:  Yarelis Powell MD as PCP - General (Internal Medicine)  Yarelis Powell MD as PCP - [facility] Empaneled Provider  Matthieu Forrest DPM as Consulting Physician (Podiatry)        Chief Complaint   Patient presents with    Toe Pain     SAW PCP DR. Yimi Purdy ON 10/14/2020         HPI:   Breana Soler is a 80 y.o. male  Patient relates pain is Present. Rash is doing much better as well as pain has reduced in all 5 bilaterally. Currently denies F/C/N/V. Pt's primary care physician is Yarelis Powell MD last seen and is seen today for a new issue patient is having numbness in both feet this is been progressively going on for many years at this time is been getting very bad where he cannot sleep n gabapentin doing better      No Known Allergies    No past medical history on file. Prior to Admission medications    Medication Sig Start Date End Date Taking? Authorizing Provider   ketoconazole (NIZORAL) 2 % shampoo Apply topically daily as needed. 3/22/21  Yes Matthieu Forrest DPM   ketoconazole (NIZORAL) 2 % cream Apply topically daily. 3/22/21  Yes Matthieu Forrest DPM   gabapentin (NEURONTIN) 100 MG capsule Take 3 capsules by mouth 3 times daily for 90 days.  Intended supply: 30 days 3/22/21 6/20/21 Yes Matthieu Forrest DPM   metoprolol succinate (TOPROL XL) 50 MG extended release tablet Take 1 tablet by mouth daily 2/4/21  Yes Yarelis Powell MD   apixaban (ELIQUIS) 5 MG TABS tablet Take 1 tablet by mouth 2 times daily 10/14/20  Yes Yarelis Powell MD   atorvastatin (LIPITOR) 10 MG tablet Take 1 tablet by mouth daily 10/14/20  Yes Yarelis Powell MD   hydroCHLOROthiazide (HYDRODIURIL) 25 MG tablet TAKE 1 TABLET DAILY 10/14/20  Yes Ranjit Verdugo MD   omeprazole (PRILOSEC) 20 MG delayed release capsule TAKE 1 CAPSULE DAILY 10/14/20  Yes Ranjit Verdugo MD   amLODIPine-benazepril (LOTREL) 10-20 MG per capsule TAKE 1 CAPSULE DAILY 9/18/20  Yes Ranjit Verdugo MD   atorvastatin (LIPITOR) 10 MG tablet TAKE 1 TABLET DAILY 7/13/20  Yes Ranjit Verdugo MD   loratadine (CLARITIN) 10 MG tablet Take 1 tablet by mouth daily 3/6/20  Yes Loreta Goodman APRN - CNP   ibuprofen (ADVIL) 200 MG tablet Take 200 mg by mouth every 6 hours as needed for Pain   Yes Historical Provider, MD       No past surgical history on file. No family history on file. Social History     Tobacco Use    Smoking status: Never Smoker    Smokeless tobacco: Never Used   Substance Use Topics    Alcohol use: Not Currently       Review of Systems   Constitutional: Negative. HENT: Negative. Cardiovascular: Negative. Gastrointestinal: Negative. Musculoskeletal: Negative. Hematological: Negative. Review of Systems:   History obtained from chart review and the patient  General ROS: negative for - chills, fatigue, fever, night sweats or weight gain  Constitutional: Negative for chills, diaphoresis, fatigue, fever and unexpected weight change. Musculoskeletal: Positive for pain with papulation   . Neurological ROS: negative for - behavioral changes, confusion, headaches or seizures. Negative for weakness and numbness. Dermatological ROS: negative for - mole changes, rash  Cardiovascular: Negative for leg swelling. Gastrointestinal: Negative for constipation, diarrhea, nausea and vomiting.                Lower Extremity Physical Examination:   Vitals:   Vitals:    03/22/21 1127   BP: 122/80   Temp: 98.2 °F (36.8 °C)        Foot Exam    General  General Appearance: appears stated age and healthy   Orientation: alert and oriented to person, place, and time       Right Foot/Ankle     Inspection and Palpation  Skin Exam: maceration, tinea, fissure, skin changes, abnormal color and erythema;     Neurovascular  Dorsalis pedis: 2+  Posterior tibial: absent  Saphenous nerve sensation: diminished  Tibial nerve sensation: diminished  Superficial peroneal nerve sensation: diminished  Deep peroneal nerve sensation: diminished  Sural nerve sensation: diminished      Left Foot/Ankle      Inspection and Palpation  Skin Exam: maceration, tinea, fissure, skin changes, abnormal color and erythema;     Neurovascular  Dorsalis pedis: 2+  Posterior tibial: absent  Saphenous nerve sensation: diminished  Tibial nerve sensation: diminished  Superficial peroneal nerve sensation: diminished  Deep peroneal nerve sensation: diminished  Sural nerve sensation: diminished          Ortho Exam  Toenail Description  Sites of Onychomycosis Involvement (Check affected area)  [x] [x] [x] [x] [x] [x] [x] [x] [x] [x]  5 4 3 2 1 1 2 3 4 5                          Right                                        Left    Thickness  [x] [x] [x] [x] [x] [x] [x] [x] [x] [x]  5 4 3 2 1 1 2 3 4 5                         Right                                        Left    Dystrophic Changes   [x] [x] [x] [x] [x] [x] [x] [x] [x] [x]  5 4 3 2 1 1 2 3 4 5                         Right                                        Left    discolored   [x] [x] [x] [x] [x] [x] [x] [x] [x] [x]  5 4 3 2 1 1 2 3 4 5                          Right                                        Left    Incurvation/Ingrowin   [] [] [x] [x] [x] [x] [x] [x] [x] []  5 4 3 2 1 1 2 3 4 5                         Right                                        Left    Inflammation/Pain   [x] [x] [x] [x] [x] [x] [x] [x] [x] [x]  5 4 3 2 1 1 2 3 4 5                         Right                                        Left  Visual inspection:  Deformity/amputation: absent  Skin lesions/pre-ulcerative calluses: absent  Edema: right- negative, left- negative    Sensory exam:  Monofilament sensation: abnormal -   (minimum of 5 random plantar locations tested, avoiding callused areas - > 1 area with absence of sensation is + for neuropathy)    Plus at least one of the following:  Pulses: ,   Pinprick: Impaired  Proprioception: Impaired  Vibration (128 Hz): Intact  General: AAO x 3 in NAD. Dermatologic Exam:  Skin lesion/ulceration Absent . Skin No rashes or nodules noted. .   Musculoskeletal:      , Bilateral.  CFT <3 seconds, Bilateral.  Hair growth absent to the level of the digits, Bilateral.  Edema absent, Bilateral.  Varicosities present, Bilateral. Erythema absent, Bilateral   Sharp dull sensation decreased bilateral plantar aspect feet    Radiographs:       Asessment: Patient is a 80 y.o. male with:    Diagnosis Orders   1. Dermatitis     2. Tinea unguium     3. Prediabetes     4. Pain in left toe(s)     5. Peripheral vascular disease, unspecified ([facility] [address]     6. Neuropathy         Plan: Pt seen for  chronic pain   oarrs report reviewed I have reviewed current medication  and prior noted I believe the need for refill is warranted  I have reviewed oarrs report and found no drug seeking behavior   I have discussed all side effects and narcotic policy   with pt  pt understands and agrees with treatment  a follow up  appointment is made . Current condition and treatment options discussed in detail. Discussed conservative and surgical options with the patient. Advised pt to . Verbal and written instructions given to patient. Contact office with any questions/problems/concerns. RTC in 3month(s). Debridement of all painful nails was performed with both manual and electrical debridement to prevent infection and/or ulceration. Patient tolerated the debridement well, without any complaints.   1-5 bilateral   Patient was asymptomatic after debridement       Electronically signed by Jh Giang DPM on 3/22/2021 at 11:58 AM  3/22/2021  New prescription for shampoo was given patient is to continue drying feet daily cotton shoes daily patient needs to use Tinactin powder

## 2021-04-08 ENCOUNTER — IMMUNIZATION (OUTPATIENT)
Dept: PRIMARY CARE CLINIC | Age: 71
End: 2021-04-08
Payer: MEDICARE

## 2021-04-08 PROCEDURE — 91301 COVID-19, MODERNA VACCINE 100MCG/0.5ML DOSE: CPT | Performed by: FAMILY MEDICINE

## 2021-04-08 PROCEDURE — 0012A COVID-19, MODERNA VACCINE 100MCG/0.5ML DOSE: CPT | Performed by: FAMILY MEDICINE

## 2021-04-14 ENCOUNTER — OFFICE VISIT (OUTPATIENT)
Dept: FAMILY MEDICINE CLINIC | Age: 71
End: 2021-04-14
Payer: MEDICARE

## 2021-04-14 VITALS
OXYGEN SATURATION: 97 % | BODY MASS INDEX: 30.08 KG/M2 | TEMPERATURE: 98.9 F | DIASTOLIC BLOOD PRESSURE: 80 MMHG | WEIGHT: 228 LBS | HEART RATE: 67 BPM | SYSTOLIC BLOOD PRESSURE: 130 MMHG

## 2021-04-14 DIAGNOSIS — Z79.01 CHRONIC ANTICOAGULATION: ICD-10-CM

## 2021-04-14 DIAGNOSIS — I48.0 PAROXYSMAL ATRIAL FIBRILLATION (HCC): ICD-10-CM

## 2021-04-14 DIAGNOSIS — E66.09 CLASS 1 OBESITY DUE TO EXCESS CALORIES WITH SERIOUS COMORBIDITY AND BODY MASS INDEX (BMI) OF 30.0 TO 30.9 IN ADULT: ICD-10-CM

## 2021-04-14 DIAGNOSIS — E74.39 GLUCOSE INTOLERANCE: Primary | ICD-10-CM

## 2021-04-14 DIAGNOSIS — I10 ESSENTIAL HYPERTENSION: ICD-10-CM

## 2021-04-14 DIAGNOSIS — R73.03 PREDIABETES: ICD-10-CM

## 2021-04-14 DIAGNOSIS — E74.39 GLUCOSE INTOLERANCE: ICD-10-CM

## 2021-04-14 LAB
ALBUMIN SERPL-MCNC: 4.4 G/DL (ref 3.5–5.2)
ALP BLD-CCNC: 82 U/L (ref 40–129)
ALT SERPL-CCNC: 18 U/L (ref 0–40)
ANION GAP SERPL CALCULATED.3IONS-SCNC: 14 MMOL/L (ref 7–16)
AST SERPL-CCNC: 17 U/L (ref 0–39)
BILIRUB SERPL-MCNC: 0.3 MG/DL (ref 0–1.2)
BUN BLDV-MCNC: 25 MG/DL (ref 8–23)
CALCIUM SERPL-MCNC: 9.6 MG/DL (ref 8.6–10.2)
CHLORIDE BLD-SCNC: 104 MMOL/L (ref 98–107)
CO2: 23 MMOL/L (ref 22–29)
CREAT SERPL-MCNC: 1.1 MG/DL (ref 0.7–1.2)
GFR AFRICAN AMERICAN: >60
GFR NON-AFRICAN AMERICAN: >60 ML/MIN/1.73
GLUCOSE BLD-MCNC: 92 MG/DL (ref 74–99)
HBA1C MFR BLD: 6.1 %
POTASSIUM SERPL-SCNC: 4.3 MMOL/L (ref 3.5–5)
SODIUM BLD-SCNC: 141 MMOL/L (ref 132–146)
TOTAL PROTEIN: 7.6 G/DL (ref 6.4–8.3)

## 2021-04-14 PROCEDURE — 83036 HEMOGLOBIN GLYCOSYLATED A1C: CPT | Performed by: INTERNAL MEDICINE

## 2021-04-14 PROCEDURE — 99214 OFFICE O/P EST MOD 30 MIN: CPT | Performed by: INTERNAL MEDICINE

## 2021-04-14 PROCEDURE — 3288F FALL RISK ASSESSMENT DOCD: CPT | Performed by: INTERNAL MEDICINE

## 2021-04-14 ASSESSMENT — PATIENT HEALTH QUESTIONNAIRE - PHQ9
SUM OF ALL RESPONSES TO PHQ QUESTIONS 1-9: 0
SUM OF ALL RESPONSES TO PHQ9 QUESTIONS 1 & 2: 0
SUM OF ALL RESPONSES TO PHQ QUESTIONS 1-9: 0
SUM OF ALL RESPONSES TO PHQ QUESTIONS 1-9: 0
1. LITTLE INTEREST OR PLEASURE IN DOING THINGS: 0
2. FEELING DOWN, DEPRESSED OR HOPELESS: 0

## 2021-04-14 NOTE — PROGRESS NOTES
19  [facility] : 1950 Sex: male  Age: 80 y.o. Chief Complaint   Patient presents with    6 Month Follow-Up       Patient denies excessive thirst, urination, hunger. His hemoglobin A1c today is 6.1 which is quite good. The patient does have a history of paroxysmal atrial fibrillation. His rate has been well controlled and he denies any tachycardia. He denies any anginal symptoms. Patient denies nocturia. He denies bleeding or bruising. He does take his Eliquis regularly. He did have both of his Covid vaccinations. His blood pressure is well controlled. Hypertension    Hyperlipidemia        Review of Systems  PMH:  Health Maintenance:  Bone Density Test Screening - (2018)  Bone Density Scan - (2017)  Colonoscopy Screening - (10/16/2017)  Colonoscopy - (2016)  Colonoscopy - (10/16/2017)  Colonoscopy - (2013)  Influenza Vaccination - (2016)  Psa Test - (2020)  Prostate Exam - (2020)  Physical Exam - (2020)  Rectal Exam - (2020)  Influenza Vaccination - (10/3/2018)  EGD - [facility], [place] SEE REPORT  Zostavax - (2014) SLIP GIVEN  Prevnar Vaccine - (2016) SLIP GIVEN  Pneumonia Vaccination - (2017)  2D ECHO - (2018)  Shingrix Vaccine (Shingles) - SLIP GIVEN  COVID VACCINE 3/2021  MEDICAL PROBLEMS:  Hypertension. Hyperlipidemia. Glucose intolerance. Metabolic syndrome. Gastroesophageal reflux disease.   EROSIVE GASTRITIS - EGD- 10/10 [facility] [place] OMEPRAZOLE STARTED  LEFT SHOULDER PAIN- REFERRED TO [facility]- SUSPECT RCT  CARPAL TUNNEL- WRIST SPLINT GIVEN   RIGHT INGUINAL HERNIA- REFERRED TO [facility]   COLONIC POLYPS   DENTAL CARIES/EROSION-REMINDED EACH VISIT TO OBTAIN DENTAL EVALUATION  PROXIMAL MUSCLE PAIN 17-[address]-PREDNISONE STARTED-PMR   AORTIC STENOSIS/PALPITATIONS/DIASTOLIC DYSFUNCTION- REFER TO CARDIOLOGY 3/21/18  Tachycardia- 2019 holter ordered-ATRIAL FIB with rapid ventricular response-  Surgical Hx:  Appendectomy, Tonsillectomy W/ Adenoidectomy, Hernia Repair  Cataract - (2016)  Left Shoulder Surgery - (2016) [facility]  Reviewed, no changes. FH:  Father:  Rheumatic Heart Disease  Negative For Non Insulin Dependent Diabetes. Mother:  Alzheimer's Disease - FROM ANESTHESIA? PUD  Negative For Non Insulin Dependent Diabetes. Reviewed, no changes. SH:  Marital: . Work Status: autoworker - retired. Personal Habits: Cigarette Use: Negative For current cigarette smoker. Alcohol: Occasionally  consumes alcohol, Occasionally consumes alcohol - last beer 2 years ago. Adri Bolden Exercise Type: exercises  sporadically. Reviewed, no changes. Date:6/24/2020  Was the patient queried about smoking behavior? Yes   Does the patient currently smoke? Smoking: Patient has never smoked. Current Outpatient Medications:     gabapentin (NEURONTIN) 100 MG capsule, Take 3 capsules by mouth 3 times daily for 90 days. Intended supply: 30 days, Disp: 810 capsule, Rfl: 0    metoprolol succinate (TOPROL XL) 50 MG extended release tablet, Take 1 tablet by mouth daily, Disp: 90 tablet, Rfl: 3    apixaban (ELIQUIS) 5 MG TABS tablet, Take 1 tablet by mouth 2 times daily, Disp: 180 tablet, Rfl: 4    hydroCHLOROthiazide (HYDRODIURIL) 25 MG tablet, TAKE 1 TABLET DAILY, Disp: 90 tablet, Rfl: 4    omeprazole (PRILOSEC) 20 MG delayed release capsule, TAKE 1 CAPSULE DAILY, Disp: 90 capsule, Rfl: 4    amLODIPine-benazepril (LOTREL) 10-20 MG per capsule, TAKE 1 CAPSULE DAILY, Disp: 90 capsule, Rfl: 3    atorvastatin (LIPITOR) 10 MG tablet, TAKE 1 TABLET DAILY, Disp: 90 tablet, Rfl: 1    ketoconazole (NIZORAL) 2 % shampoo, Apply topically daily as needed. , Disp: 100 mL, Rfl: 5    ketoconazole (NIZORAL) 2 % cream, Apply topically daily. , Disp: 30 g, Rfl: 5    atorvastatin (LIPITOR) 10 MG tablet, Take 1 tablet by mouth daily, Disp: 90 tablet, Rfl: 4    loratadine (CLARITIN) 10 MG tablet, Take 1 tablet by mouth daily, Disp: 30 tablet, Rfl: 0    ibuprofen (ADVIL) 200 MG tablet, Take 200 mg by mouth every 6 hours as needed for Pain, Disp: , Rfl:   No Known Allergies    No past medical history on file. No past surgical history on file. No family history on file. Social History     Socioeconomic History    Marital status:      Spouse name: Not on file    Number of children: Not on file    Years of education: Not on file    Highest education level: Not on file   Occupational History    Not on file   Social Needs    Financial resource strain: Not on file    Food insecurity     Worry: Not on file     Inability: Not on file    Transportation needs     Medical: Not on file     Non-medical: Not on file   Tobacco Use    Smoking status: Never Smoker    Smokeless tobacco: Never Used   Substance and Sexual Activity    Alcohol use: Not Currently    Drug use: Never    Sexual activity: Yes     Partners: Female   Lifestyle    Physical activity     Days per week: Not on file     Minutes per session: Not on file    Stress: Not on file   Relationships    Social connections     Talks on phone: Not on file     Gets together: Not on file     Attends Latter day service: Not on file     Active member of club or organization: Not on file     Attends meetings of clubs or organizations: Not on file     Relationship status: Not on file    Intimate partner violence     Fear of current or ex partner: Not on file     Emotionally abused: Not on file     Physically abused: Not on file     Forced sexual activity: Not on file   Other Topics Concern    Not on file   Social History Narrative    Not on file       Vitals:    04/14/21 1130   BP: 130/80   Pulse: 67   Temp: 98.9 °F (37.2 °C)   SpO2: 97%   Weight: 228 lb (103.4 kg)       Physical Exam  Exam:  Const: Appears healthy,well developed and well nourished. Appears obese. Eyes: EOMI in both eyes. PERRL. ENMT: External canals are clear and dry. Tympanic membranes are intact. External nose WNL.   Neck: Supple and symmetric. Palpation reveals no adenopathy. No masses appreciated. Thyroid  exhibits no nodules or thyromegaly. No JVD. Resp: Respirations are unlabored. Respiration rate is normal. Auscultate good airflow. No rales,  rhonchi or wheezes appreciated over the lungs bilaterally. CV: Rhythm is regular. S1 is normal. S2 is normal. No S4 gallop. Grade 3/6, systolic murmur. Carotids: no bruits. Abdominal aorta is not palpable. Pedal pulses: 2+ and equal bilaterally. Extremities: No clubbing, cyanosis or edema. Abdomen: Bowel sounds are normoactive. Palpation of the abdomen reveals softness, but no  distension, organomegaly or tenderness. No abdominal masses. No palpable hepatosplenomegaly. Musculo: Walks with a normal gait. Upper Extremities: ROM is physiologic. Lower Extremities: Full  ROM bilaterally. Skin: Dry and warm with no rash. Neuro: Alert and oriented x3. Mood is normal. Affect is normal. Speech is articulate and fluent. Reflexes: DTR's are intact, symmetric and 2+ bilaterally. Psych: Patient's attitude is cooperative. Patient's affect is appropriate. Judgement is realistic. Insight  is appropriate. Mario Galeana was seen today for 6 month follow-up. Diagnoses and all orders for this visit:    Glucose intolerance  -     POCT glycosylated hemoglobin (Hb A1C)  -     Comprehensive Metabolic Panel; Future    Essential hypertension  -     POCT glycosylated hemoglobin (Hb A1C)  -     Comprehensive Metabolic Panel; Future    Paroxysmal atrial fibrillation (HCC)  -     POCT glycosylated hemoglobin (Hb A1C)  -     Comprehensive Metabolic Panel; Future    Chronic anticoagulation  -     POCT glycosylated hemoglobin (Hb A1C)  -     Comprehensive Metabolic Panel; Future    Prediabetes  -     POCT glycosylated hemoglobin (Hb A1C)  -     Comprehensive Metabolic Panel;  Future    Class 1 obesity due to excess calories with serious comorbidity and body mass index (BMI) of 30.0 to 30.9 in adult  -     Comprehensive Metabolic Panel; Future    Patient is to have lab work today. We will see him back in 4 months. Complete exam at that time including prostate.

## 2021-06-21 ENCOUNTER — PROCEDURE VISIT (OUTPATIENT)
Dept: PODIATRY | Age: 71
End: 2021-06-21
Payer: MEDICARE

## 2021-06-21 VITALS
BODY MASS INDEX: 29.69 KG/M2 | WEIGHT: 225 LBS | TEMPERATURE: 98.2 F | DIASTOLIC BLOOD PRESSURE: 72 MMHG | SYSTOLIC BLOOD PRESSURE: 112 MMHG

## 2021-06-21 DIAGNOSIS — L30.9 DERMATITIS: ICD-10-CM

## 2021-06-21 DIAGNOSIS — B35.1 TINEA UNGUIUM: ICD-10-CM

## 2021-06-21 DIAGNOSIS — M79.675 PAIN IN LEFT TOE(S): ICD-10-CM

## 2021-06-21 DIAGNOSIS — R73.03 PREDIABETES: ICD-10-CM

## 2021-06-21 DIAGNOSIS — I73.9 PERIPHERAL VASCULAR DISEASE, UNSPECIFIED (HCC): ICD-10-CM

## 2021-06-21 DIAGNOSIS — G62.9 NEUROPATHY: ICD-10-CM

## 2021-06-21 DIAGNOSIS — M79.674 PAIN IN TOE OF RIGHT FOOT: ICD-10-CM

## 2021-06-21 PROCEDURE — 99213 OFFICE O/P EST LOW 20 MIN: CPT | Performed by: PODIATRIST

## 2021-06-21 PROCEDURE — 11721 DEBRIDE NAIL 6 OR MORE: CPT | Performed by: PODIATRIST

## 2021-06-21 RX ORDER — GABAPENTIN 100 MG/1
300 CAPSULE ORAL 3 TIMES DAILY
Qty: 810 CAPSULE | Refills: 0 | Status: SHIPPED
Start: 2021-06-21 | End: 2021-09-20

## 2021-06-21 ASSESSMENT — ENCOUNTER SYMPTOMS
EYES NEGATIVE: 1
GASTROINTESTINAL NEGATIVE: 1
RESPIRATORY NEGATIVE: 1

## 2021-06-21 NOTE — PROGRESS NOTES
[address] [place] [address] [place]   Dept: [phone]  Dept Fax: [phone]    Diabetic  PATIENT PROGRESS NOTE  Date of patient's visit: 6/21/2021  Patient's Name:  Rayray Vigil YOB: 1950            Patient Care Team:  Tavo Manzano MD as PCP - General (Internal Medicine)  Tavo Manzano MD as PCP - REHABILITATION [facility] Empaneled Provider  Aubrie Orr DPM as Consulting Physician (Podiatry)        Chief Complaint   Patient presents with    Toe Pain     saw pcp Dr. Mago Mckeon 4/14/2021         HPI:   Rayray Vigil is a 80 y.o. male  Patient relates pain is Present. Rash is doing much better as well as pain has reduced in all 5 bilaterally. Currently denies F/C/N/V. Pt's primary care physician is Tavo Manzano MD last seen and is seen today for a new issue patient is having numbness in both feet this is been progressively going on for many years at this time is been getting very bad where he cannot sleep n gabapentin doing better      No Known Allergies    No past medical history on file. Prior to Admission medications    Medication Sig Start Date End Date Taking? Authorizing Provider   gabapentin (NEURONTIN) 100 MG capsule Take 3 capsules by mouth 3 times daily for 90 days. Intended supply: 30 days 6/21/21 9/19/21 Yes Aubrie Orr DPM   ketoconazole (NIZORAL) 2 % shampoo Apply topically daily as needed. 3/22/21  Yes Aubrie Orr DPM   ketoconazole (NIZORAL) 2 % cream Apply topically daily.  3/22/21  Yes Aubrie Orr DPM   metoprolol succinate (TOPROL XL) 50 MG extended release tablet Take 1 tablet by mouth daily 2/4/21  Yes Tavo Manzano MD   apixaban (ELIQUIS) 5 MG TABS tablet Take 1 tablet by mouth 2 times daily 10/14/20  Yes Tavo Manzano MD   atorvastatin (LIPITOR) 10 MG tablet Take 1 tablet by mouth daily 10/14/20  Yes Tavo Manzano MD   hydroCHLOROthiazide (HYDRODIURIL) 25 MG tablet TAKE 1 TABLET DAILY 10/14/20  Yes Sonali Rollins MD   omeprazole (PRILOSEC) 20 MG delayed release capsule TAKE 1 CAPSULE DAILY 10/14/20  Yes Sonali Rollins MD   amLODIPine-benazepril (LOTREL) 10-20 MG per capsule TAKE 1 CAPSULE DAILY 9/18/20  Yes Sonali Rollins MD   atorvastatin (LIPITOR) 10 MG tablet TAKE 1 TABLET DAILY 7/13/20  Yes Sonali Rollins MD   loratadine (CLARITIN) 10 MG tablet Take 1 tablet by mouth daily 3/6/20  Yes CECELIA Llanos - CNP   ibuprofen (ADVIL) 200 MG tablet Take 200 mg by mouth every 6 hours as needed for Pain   Yes Historical Provider, MD       No past surgical history on file. No family history on file. Social History     Tobacco Use    Smoking status: Never Smoker    Smokeless tobacco: Never Used   Substance Use Topics    Alcohol use: Not Currently       Review of Systems   Constitutional: Negative. HENT: Negative. Eyes: Negative. Respiratory: Negative. Cardiovascular: Negative. Gastrointestinal: Negative. Endocrine: Negative. Genitourinary: Negative. Musculoskeletal: Negative. Hematological: Negative. Review of Systems:   History obtained from chart review and the patient  General ROS: negative for - chills, fatigue, fever, night sweats or weight gain  Constitutional: Negative for chills, diaphoresis, fatigue, fever and unexpected weight change. Musculoskeletal: Positive for pain with papulation   . Neurological ROS: negative for - behavioral changes, confusion, headaches or seizures. Negative for weakness and numbness. Dermatological ROS: negative for - mole changes, rash  Cardiovascular: Negative for leg swelling. Gastrointestinal: Negative for constipation, diarrhea, nausea and vomiting.                Lower Extremity Physical Examination:   Vitals:   Vitals:    06/21/21 1123   BP: 112/72   Temp: 98.2 °F (36.8 °C)        Foot Exam    General  General Appearance: appears stated age and healthy   Orientation: alert and oriented to person, place, and time       Right Foot/Ankle     Inspection and Palpation  Skin Exam: maceration, tinea, fissure, skin changes, abnormal color and erythema;     Neurovascular  Dorsalis pedis: 2+  Posterior tibial: absent  Saphenous nerve sensation: diminished  Tibial nerve sensation: diminished  Superficial peroneal nerve sensation: diminished  Deep peroneal nerve sensation: diminished  Sural nerve sensation: diminished      Left Foot/Ankle      Inspection and Palpation  Skin Exam: maceration, tinea, fissure, skin changes, abnormal color and erythema;     Neurovascular  Dorsalis pedis: 2+  Posterior tibial: absent  Saphenous nerve sensation: diminished  Tibial nerve sensation: diminished  Superficial peroneal nerve sensation: diminished  Deep peroneal nerve sensation: diminished  Sural nerve sensation: diminished          Ortho Exam  Toenail Description  Sites of Onychomycosis Involvement (Check affected area)  [x] [x] [x] [x] [x] [x] [x] [x] [x] [x]  5 4 3 2 1 1 2 3 4 5                          Right                                        Left    Thickness  [x] [x] [x] [x] [x] [x] [x] [x] [x] [x]  5 4 3 2 1 1 2 3 4 5                         Right                                        Left    Dystrophic Changes   [x] [x] [x] [x] [x] [x] [x] [x] [x] [x]  5 4 3 2 1 1 2 3 4 5                         Right                                        Left    discolored   [x] [x] [x] [x] [x] [x] [x] [x] [x] [x]  5 4 3 2 1 1 2 3 4 5                          Right                                        Left    Incurvation/Ingrowin   [] [] [x] [x] [x] [x] [x] [x] [x] []  5 4 3 2 1 1 2 3 4 5                         Right                                        Left    Inflammation/Pain   [x] [x] [x] [x] [x] [x] [x] [x] [x] [x]  5 4 3 2 1 1 2 3 4 5                         Right                                        Left  Visual inspection:  Deformity/amputation: absent  Skin lesions/pre-ulcerative calluses: absent  Edema: right- negative, left- negative    Sensory exam:  Monofilament sensation: abnormal -   (minimum of 5 random plantar locations tested, avoiding callused areas - > 1 area with absence of sensation is + for neuropathy)    Plus at least one of the following:  Pulses: ,   Pinprick: Impaired  Proprioception: Impaired  Vibration (128 Hz): Intact  General: AAO x 3 in NAD. Dermatologic Exam:  Skin lesion/ulceration Absent . Skin No rashes or nodules noted. .   Musculoskeletal:      , Bilateral.  CFT <3 seconds, Bilateral.  Hair growth absent to the level of the digits, Bilateral.  Edema absent, Bilateral.  Varicosities present, Bilateral. Erythema absent, Bilateral   Sharp dull sensation decreased bilateral plantar aspect feet    Radiographs:       Asessment: Patient is a 80 y.o. male with:    Diagnosis Orders   1. Tinea unguium     2. Prediabetes     3. Pain in left toe(s)     4. Peripheral vascular disease, unspecified ([facility] [address]     5. Neuropathy     6. Dermatitis     7. Pain in toe of right foot         Plan: Pt seen for  chronic pain   oarrs report reviewed I have reviewed current medication  and prior noted I believe the need for refill is warranted  I have reviewed oarrs report and found no drug seeking behavior   I have discussed all side effects and narcotic policy   with pt  pt understands and agrees with treatment  a follow up  appointment is made . Current condition and treatment options discussed in detail. Discussed conservative and surgical options with the patient. Advised pt to . Verbal and written instructions given to patient. Contact office with any questions/problems/concerns. RTC in 3month(s). Debridement of all painful nails was performed with both manual and electrical debridement to prevent infection and/or ulceration. Patient tolerated the debridement well, without any complaints.   1-5 bilateral   Patient was asymptomatic after debridement     New rx sent gabapentin 300  tid  Pt seen for  chronic pain   oarrs report reviewed I have reviewed current medication  and prior noted I believe the need for refill is warranted  I have reviewed oarrs report and found no drug seeking behavior   I have discussed all side effects and narcotic policy   with pt  pt understands and agrees with treatment  a follow up  appointment is made      Electronically signed by Adán Sanabria DPM on 6/21/2021 at 11:42 AM  6/21/2021  New prescription for shampoo was given patient is to continue drying feet daily cotton shoes daily patient needs to use Tinactin powder

## 2021-09-13 RX ORDER — AMLODIPINE BESYLATE AND BENAZEPRIL HYDROCHLORIDE 10; 20 MG/1; MG/1
CAPSULE ORAL
Qty: 90 CAPSULE | Refills: 3 | Status: SHIPPED
Start: 2021-09-13 | End: 2022-09-08

## 2021-09-13 NOTE — TELEPHONE ENCOUNTER
Last Appointment:  4/14/2021  Future Appointments   Date Time Provider Mary Garciai   9/14/2021  3:30 PM Kailey Florence  [address] [place] [address]   9/20/2021 11:00 AM Bandar Gutierrez DPM [facility] Podiatry [facility]

## 2021-09-14 ENCOUNTER — OFFICE VISIT (OUTPATIENT)
Dept: FAMILY MEDICINE CLINIC | Age: 71
End: 2021-09-14
Payer: MEDICARE

## 2021-09-14 VITALS
BODY MASS INDEX: 29.42 KG/M2 | SYSTOLIC BLOOD PRESSURE: 136 MMHG | WEIGHT: 223 LBS | OXYGEN SATURATION: 97 % | HEART RATE: 69 BPM | TEMPERATURE: 98.7 F | DIASTOLIC BLOOD PRESSURE: 80 MMHG

## 2021-09-14 DIAGNOSIS — Z79.01 CHRONIC ANTICOAGULATION: ICD-10-CM

## 2021-09-14 DIAGNOSIS — Z12.5 PROSTATE CANCER SCREENING: ICD-10-CM

## 2021-09-14 DIAGNOSIS — M54.16 LUMBAR RADICULOPATHY: ICD-10-CM

## 2021-09-14 DIAGNOSIS — E74.39 GLUCOSE INTOLERANCE: ICD-10-CM

## 2021-09-14 DIAGNOSIS — I48.0 PAROXYSMAL ATRIAL FIBRILLATION (HCC): ICD-10-CM

## 2021-09-14 DIAGNOSIS — R73.03 PREDIABETES: ICD-10-CM

## 2021-09-14 DIAGNOSIS — E66.09 CLASS 1 OBESITY DUE TO EXCESS CALORIES WITH SERIOUS COMORBIDITY AND BODY MASS INDEX (BMI) OF 30.0 TO 30.9 IN ADULT: ICD-10-CM

## 2021-09-14 DIAGNOSIS — I10 ESSENTIAL HYPERTENSION: Primary | ICD-10-CM

## 2021-09-14 PROCEDURE — 83036 HEMOGLOBIN GLYCOSYLATED A1C: CPT | Performed by: INTERNAL MEDICINE

## 2021-09-14 PROCEDURE — 99214 OFFICE O/P EST MOD 30 MIN: CPT | Performed by: INTERNAL MEDICINE

## 2021-09-14 RX ORDER — OMEPRAZOLE 20 MG/1
CAPSULE, DELAYED RELEASE ORAL
Qty: 90 CAPSULE | Refills: 4 | Status: SHIPPED
Start: 2021-09-14 | End: 2021-11-15

## 2021-09-14 NOTE — PROGRESS NOTES
19  Polo Mcgovern : 1950 Sex: male  Age: 80 y.o. No chief complaint on file. Patient has intentionally lost weight over the last year. He is denying excessive thirst, urination, hunger. Patient will have a PSA level today but he did not wish to undergo prostate examination and agrees to have that at next visit. Patient did have both Covid vaccinations. Blood pressure is well controlled here. He denies cardiac or respiratory symptoms. He is chronically anticoagulated and he denies any bleeding or bruising. There is been no issues with other bleeding including GI mucosal etc.  He has not had rapid atrial fibrillation. His colonoscopy is not due until next year. He has not had symptoms of recurrent polymyalgia rheumatica. He does note new symptoms of lumbar radiculopathy in the distribution of L4 on the right side. This just started recently. He denies any falls or injury to his lower back. Hypertension    Hyperlipidemia        Review of Systems  PMH:  Health Maintenance:  Bone Density Test Screening - (2018)  Bone Density Scan - (2017)  Colonoscopy Screening - (10/16/2017)  Colonoscopy - (2016)  Colonoscopy - (10/16/2017)  Colonoscopy - (2013)  Influenza Vaccination - ()  Psa Test - (2020)  Prostate Exam - (2020)  Physical Exam - (2021)  Rectal Exam - (2020)  Influenza Vaccination - (10/3/2018)  EGD [facility] SEE REPORT  Zostavax - (2014) SLIP GIVEN  Prevnar Vaccine - (2016) SLIP GIVEN  Pneumonia Vaccination - (2017)  2D ECHO - (2018)  Shingrix Vaccine (Shingles) - SLIP GIVEN  COVID VACCINE 3/2021  MEDICAL PROBLEMS:  Hypertension. Hyperlipidemia. Glucose intolerance. Metabolic syndrome. Gastroesophageal reflux disease.   EROSIVE GASTRITIS - EGD- 10/10 [facility] OMEPRAZOLE STARTED  LEFT SHOULDER PAIN- REFERRED TO MICHELE- SUSPECT RCT  CARPAL TUNNEL- WRIST SPLINT GIVEN   RIGHT INGUINAL HERNIA- REFERRED TO LAQUITA 1/14  COLONIC POLYPS 9/13  DENTAL CARIES/EROSION-REMINDED EACH VISIT TO OBTAIN DENTAL EVALUATION  PROXIMAL MUSCLE PAIN 12/5/17-VENTURA UNDERWAY-PREDNISONE STARTED-PMR 2017  AORTIC STENOSIS/PALPITATIONS/DIASTOLIC DYSFUNCTION- REFER TO CARDIOLOGY 3/21/18  Tachycardia- 6/14/2019 holter ordered-ATRIAL FIB with rapid ventricular response-  Surgical Hx:  Appendectomy, Tonsillectomy W/ Adenoidectomy, Hernia Repair  Cataract - (2016)  Left Shoulder Surgery - (2016) MICHELE  Reviewed, no changes. FH:  Father:  Rheumatic Heart Disease  Negative For Non Insulin Dependent Diabetes. Mother:  Alzheimer's Disease - FROM ANESTHESIA? PUD  Negative For Non Insulin Dependent Diabetes. Reviewed, no changes. SH:  Marital: . Work Status: autoworker - retired. Personal Habits: Cigarette Use: Negative For current cigarette smoker. Alcohol: Occasionally  consumes alcohol, Occasionally consumes alcohol - last beer 2 years ago. Blanchie Bevels Exercise Type: exercises  sporadically. Reviewed, no changes. Date:9/14/2021  Was the patient queried about smoking behavior? Yes   Does the patient currently smoke? Smoking: Patient has never smoked. Current Outpatient Medications:     amLODIPine-benazepril (LOTREL) 10-20 MG per capsule, TAKE 1 CAPSULE DAILY, Disp: 90 capsule, Rfl: 3    gabapentin (NEURONTIN) 100 MG capsule, Take 3 capsules by mouth 3 times daily for 90 days.  Intended supply: 30 days, Disp: 810 capsule, Rfl: 0    metoprolol succinate (TOPROL XL) 50 MG extended release tablet, Take 1 tablet by mouth daily, Disp: 90 tablet, Rfl: 3    apixaban (ELIQUIS) 5 MG TABS tablet, Take 1 tablet by mouth 2 times daily, Disp: 180 tablet, Rfl: 4    hydroCHLOROthiazide (HYDRODIURIL) 25 MG tablet, TAKE 1 TABLET DAILY, Disp: 90 tablet, Rfl: 4    omeprazole (PRILOSEC) 20 MG delayed release capsule, TAKE 1 CAPSULE DAILY, Disp: 90 capsule, Rfl: 4    atorvastatin (LIPITOR) 10 MG tablet, TAKE 1 TABLET DAILY, Disp: 90 tablet, Rfl: 1    ketoconazole (NIZORAL) 2 % shampoo, Apply topically daily as needed. , Disp: 100 mL, Rfl: 5    ketoconazole (NIZORAL) 2 % cream, Apply topically daily. , Disp: 30 g, Rfl: 5    atorvastatin (LIPITOR) 10 MG tablet, Take 1 tablet by mouth daily, Disp: 90 tablet, Rfl: 4    loratadine (CLARITIN) 10 MG tablet, Take 1 tablet by mouth daily, Disp: 30 tablet, Rfl: 0    ibuprofen (ADVIL) 200 MG tablet, Take 200 mg by mouth every 6 hours as needed for Pain, Disp: , Rfl:   No Known Allergies    No past medical history on file. No past surgical history on file. No family history on file. Social History     Socioeconomic History    Marital status:      Spouse name: Not on file    Number of children: Not on file    Years of education: Not on file    Highest education level: Not on file   Occupational History    Not on file   Tobacco Use    Smoking status: Never Smoker    Smokeless tobacco: Never Used   Vaping Use    Vaping Use: Never used   Substance and Sexual Activity    Alcohol use: Not Currently    Drug use: Never    Sexual activity: Yes     Partners: Female   Other Topics Concern    Not on file   Social History Narrative    Not on file     Social Determinants of Health     Financial Resource Strain:     Difficulty of Paying Living Expenses:    Food Insecurity:     Worried About Running Out of Food in the Last Year:     [address] in the Last Year:    Transportation Needs:     Lack of Transportation (Medical):      Lack of Transportation (Non-Medical):    Physical Activity:     Days of Exercise per Week:     Minutes of Exercise per Session:    Stress:     Feeling of Stress :    Social Connections:     Frequency of Communication with Friends and Family:     Frequency of Social Gatherings with Friends and Family:     Attends Mu-ism Services:     Active Member of Clubs or Organizations:     Attends Club or Organization Meetings:     Marital Status:    Intimate Partner Violence:     Fear of Current or Ex-Partner:     Emotionally Abused:     Physically Abused:     Sexually Abused:        Vitals:    09/14/21 1548   BP: 136/80   Pulse: 69   Temp: 98.7 °F (37.1 °C)   SpO2: 97%   Weight: 223 lb (101.2 kg)       Physical Exam  Exam:  Const: Appears healthy,well developed and well nourished. Appears severely overweight. Eyes: EOMI in both eyes. PERRL. ENMT: External canals are clear and dry. Tympanic membranes are intact. External nose WNL. Slight cerumen buildup on the right ear canal.  Neck: Supple and symmetric. Palpation reveals no adenopathy. No masses appreciated. Thyroid  exhibits no nodules or thyromegaly. No JVD. Resp: Respirations are unlabored. Respiration rate is normal. Auscultate good airflow. No rales,  rhonchi or wheezes appreciated over the lungs bilaterally. CV: Rhythm is regular. S1 is normal. S2 is normal. No S4 gallop. Grade 3/6, systolic murmur. Carotids: no bruits. Abdominal aorta is not palpable. Pedal pulses: 2+ and equal bilaterally. Extremities: No clubbing, cyanosis or edema. Abdomen: Bowel sounds are normoactive. Palpation of the abdomen reveals softness, but no  distension, organomegaly or tenderness. No abdominal masses. No palpable hepatosplenomegaly. Musculo: Walks with a normal gait. Upper Extremities: ROM is physiologic. Lower Extremities: Full  ROM bilaterally. Skin: Dry and warm with no rash. Neuro: Alert and oriented x3. Mood is normal. Affect is normal. Speech is articulate and fluent. Reflexes: Equal patellar reflex bilaterally, diminished plantar reflex on the right compared to the left. Psych: Patient's attitude is cooperative. Patient's affect is appropriate. Judgement is realistic. Insight  is appropriate. Diagnoses and all orders for this visit:    Essential hypertension  -     omeprazole (PRILOSEC) 20 MG delayed release capsule; TAKE 1 CAPSULE DAILY  -     POCT glycosylated hemoglobin (Hb A1C)  -     Comprehensive Metabolic Panel;  Future  - CBC Auto Differential; Future  -     Lipid Panel; Future    Paroxysmal atrial fibrillation (HCC)  -     omeprazole (PRILOSEC) 20 MG delayed release capsule; TAKE 1 CAPSULE DAILY  -     POCT glycosylated hemoglobin (Hb A1C)  -     Comprehensive Metabolic Panel; Future  -     CBC Auto Differential; Future  -     Lipid Panel; Future    Chronic anticoagulation  -     omeprazole (PRILOSEC) 20 MG delayed release capsule; TAKE 1 CAPSULE DAILY  -     POCT glycosylated hemoglobin (Hb A1C)  -     Comprehensive Metabolic Panel; Future  -     CBC Auto Differential; Future  -     Lipid Panel; Future    Class 1 obesity due to excess calories with serious comorbidity and body mass index (BMI) of 30.0 to 30.9 in adult  -     omeprazole (PRILOSEC) 20 MG delayed release capsule; TAKE 1 CAPSULE DAILY  -     POCT glycosylated hemoglobin (Hb A1C)  -     Comprehensive Metabolic Panel; Future  -     CBC Auto Differential; Future  -     Lipid Panel; Future    Glucose intolerance  -     omeprazole (PRILOSEC) 20 MG delayed release capsule; TAKE 1 CAPSULE DAILY  -     POCT glycosylated hemoglobin (Hb A1C)  -     Comprehensive Metabolic Panel; Future  -     CBC Auto Differential; Future  -     Lipid Panel; Future    Prediabetes  -     omeprazole (PRILOSEC) 20 MG delayed release capsule; TAKE 1 CAPSULE DAILY  -     POCT glycosylated hemoglobin (Hb A1C)  -     Comprehensive Metabolic Panel; Future  -     CBC Auto Differential; Future  -     Lipid Panel; Future  -     HEMOGLOBIN A1C; Future    Prostate cancer screening  -     PSA SCREENING; Future    Lumbar radiculopathy    I did discuss with the patient possible physical therapy for lumbar radiculopathy. Patient does not wish to proceed with this because of the inconvenience. I did ask him to search for Chris exercises and see if this might be helpful. He is not having any danger symptoms of bowel or bladder incontinence or footdrop. He is to work on this and see if it improves.   If it does not he will need further evaluation and treatment. Lab work will be obtained today.

## 2021-09-20 ENCOUNTER — PROCEDURE VISIT (OUTPATIENT)
Dept: PODIATRY | Age: 71
End: 2021-09-20
Payer: MEDICARE

## 2021-09-20 VITALS
SYSTOLIC BLOOD PRESSURE: 122 MMHG | DIASTOLIC BLOOD PRESSURE: 80 MMHG | TEMPERATURE: 97.8 F | WEIGHT: 223 LBS | BODY MASS INDEX: 29.42 KG/M2

## 2021-09-20 DIAGNOSIS — I73.9 PERIPHERAL VASCULAR DISEASE, UNSPECIFIED (HCC): ICD-10-CM

## 2021-09-20 DIAGNOSIS — M79.674 PAIN IN TOE OF RIGHT FOOT: ICD-10-CM

## 2021-09-20 DIAGNOSIS — R73.03 PREDIABETES: ICD-10-CM

## 2021-09-20 DIAGNOSIS — M79.675 PAIN IN LEFT TOE(S): ICD-10-CM

## 2021-09-20 DIAGNOSIS — G62.9 NEUROPATHY: Primary | ICD-10-CM

## 2021-09-20 DIAGNOSIS — B35.1 TINEA UNGUIUM: ICD-10-CM

## 2021-09-20 PROCEDURE — 11721 DEBRIDE NAIL 6 OR MORE: CPT | Performed by: PODIATRIST

## 2021-09-20 PROCEDURE — 99213 OFFICE O/P EST LOW 20 MIN: CPT | Performed by: PODIATRIST

## 2021-09-20 RX ORDER — PREGABALIN 75 MG/1
75 CAPSULE ORAL 2 TIMES DAILY
Qty: 120 CAPSULE | Refills: 0 | Status: SHIPPED
Start: 2021-09-20 | End: 2022-06-27 | Stop reason: DRUGHIGH

## 2021-09-20 ASSESSMENT — ENCOUNTER SYMPTOMS
EYES NEGATIVE: 1
RESPIRATORY NEGATIVE: 1
GASTROINTESTINAL NEGATIVE: 1

## 2021-09-20 NOTE — PROGRESS NOTES
[address] [place], [place] [address]   Dept: [phone]  Dept Fax: [phone]    Diabetic  PATIENT PROGRESS NOTE  Date of patient's visit: 9/20/2021  Patient's Name:  Ottoniel Erazo YOB: 1950            Patient Care Team:  Javed Rajput MD as PCP - General (Internal Medicine)  Javed Rajput MD as PCP - [facility] Empaneled Provider  Robi Mancuso DPM as Consulting Physician (Podiatry)        Chief Complaint   Patient presents with    Toe Pain     saw pcp Dr. Rubén Lowery 9/14/2021         HPI:   Ottoniel Erazo is a 80 y.o. male  Patient relates pain is Present. Rash is doing much better as well as pain has reduced in all 5 bilaterally. Currently denies F/C/N/V. Pt's primary care physician is Javed Rajput MD last seen and is seen today for a  issue patient is having numbness in both feet this is been progressively gabapentin  Did not help    No Known Allergies    No past medical history on file. Prior to Admission medications    Medication Sig Start Date End Date Taking? Authorizing Provider   pregabalin (LYRICA) 75 MG capsule Take 1 capsule by mouth 2 times daily for 60 days. 9/20/21 11/19/21 Yes Robi Mancuso DPM   omeprazole (PRILOSEC) 20 MG delayed release capsule TAKE 1 CAPSULE DAILY 9/14/21  Yes Javed Rajput MD   amLODIPine-benazepril (LOTREL) 10-20 MG per capsule TAKE 1 CAPSULE DAILY 9/13/21  Yes Javed Rajput MD   ketoconazole (NIZORAL) 2 % shampoo Apply topically daily as needed. 3/22/21  Yes Robi Mancuso DPM   ketoconazole (NIZORAL) 2 % cream Apply topically daily.  3/22/21  Yes Robi Mancuso DPM   metoprolol succinate (TOPROL XL) 50 MG extended release tablet Take 1 tablet by mouth daily 2/4/21  Yes Javed Rajput MD   apixaban (ELIQUIS) 5 MG TABS tablet Take 1 tablet by mouth 2 times daily 10/14/20  Yes Javed Rajput MD   atorvastatin (LIPITOR) 10 MG tablet Take 1 tablet by mouth daily 10/14/20 Yes Tanvi Mandujano MD   hydroCHLOROthiazide (HYDRODIURIL) 25 MG tablet TAKE 1 TABLET DAILY 10/14/20  Yes Tanvi Mandujano MD   atorvastatin (LIPITOR) 10 MG tablet TAKE 1 TABLET DAILY 7/13/20  Yes Tanvi Mandujano MD   loratadine (CLARITIN) 10 MG tablet Take 1 tablet by mouth daily 3/6/20  Yes Eliazar GoCECELIA - CNP   ibuprofen (ADVIL) 200 MG tablet Take 200 mg by mouth every 6 hours as needed for Pain   Yes Historical Provider, MD       No past surgical history on file. No family history on file. Social History     Tobacco Use    Smoking status: Never Smoker    Smokeless tobacco: Never Used   Substance Use Topics    Alcohol use: Not Currently       Review of Systems   Constitutional: Negative. HENT: Negative. Eyes: Negative. Respiratory: Negative. Cardiovascular: Negative. Gastrointestinal: Negative. Endocrine: Negative. Genitourinary: Negative. Musculoskeletal: Positive for joint swelling. Hematological: Negative. Review of Systems:   History obtained from chart review and the patient  General ROS: negative for - chills, fatigue, fever, night sweats or weight gain  Constitutional: Negative for chills, diaphoresis, fatigue, fever and unexpected weight change. Musculoskeletal: Positive for pain with papulation   . Neurological ROS: negative for - behavioral changes, confusion, headaches or seizures. Negative for weakness and numbness. Dermatological ROS: negative for - mole changes, rash  Cardiovascular: Negative for leg swelling. Gastrointestinal: Negative for constipation, diarrhea, nausea and vomiting.                Lower Extremity Physical Examination:   Vitals:   Vitals:    09/20/21 1056   BP: 122/80   Temp: 97.8 °F (36.6 °C)        Foot Exam    General  General Appearance: appears stated age and healthy   Orientation: alert and oriented to person, place, and time       Right Foot/Ankle     Inspection and Palpation  Skin Exam: maceration, tinea, fissure, skin changes, abnormal color and erythema;     Neurovascular  Dorsalis pedis: 2+  Posterior tibial: absent  Saphenous nerve sensation: diminished  Tibial nerve sensation: diminished  Superficial peroneal nerve sensation: diminished  Deep peroneal nerve sensation: diminished  Sural nerve sensation: diminished      Left Foot/Ankle      Inspection and Palpation  Skin Exam: maceration, tinea, fissure, skin changes, abnormal color and erythema;     Neurovascular  Dorsalis pedis: 2+  Posterior tibial: absent  Saphenous nerve sensation: diminished  Tibial nerve sensation: diminished  Superficial peroneal nerve sensation: diminished  Deep peroneal nerve sensation: diminished  Sural nerve sensation: diminished          Ortho Exam  Toenail Description  Sites of Onychomycosis Involvement (Check affected area)  [x] [x] [x] [x] [x] [x] [x] [x] [x] [x]  5 4 3 2 1 1 2 3 4 5                          Right                                        Left    Thickness  [x] [x] [x] [x] [x] [x] [x] [x] [x] [x]  5 4 3 2 1 1 2 3 4 5                         Right                                        Left    Dystrophic Changes   [x] [x] [x] [x] [x] [x] [x] [x] [x] [x]  5 4 3 2 1 1 2 3 4 5                         Right                                        Left    discolored   [x] [x] [x] [x] [x] [x] [x] [x] [x] [x]  5 4 3 2 1 1 2 3 4 5                          Right                                        Left    Incurvation/Ingrowin   [] [] [x] [x] [x] [x] [x] [x] [x] []  5 4 3 2 1 1 2 3 4 5                         Right                                        Left    Inflammation/Pain   [x] [x] [x] [x] [x] [x] [x] [x] [x] [x]  5 4 3 2 1 1 2 3 4 5                         Right                                        Left  Visual inspection:  Deformity/amputation: absent  Skin lesions/pre-ulcerative calluses: absent  Edema: right- negative, left- negative    Sensory exam:  Monofilament sensation: abnormal -   (minimum of 5 random plantar locations tested, avoiding callused areas - > 1 area with absence of sensation is + for neuropathy)    Plus at least one of the following:  Pulses: ,   Pinprick: Impaired  Proprioception: Impaired  Vibration (128 Hz): Intact  General: AAO x 3 in NAD. Dermatologic Exam:  Skin lesion/ulceration Absent . Skin No rashes or nodules noted. .   Musculoskeletal:      , Bilateral.  CFT <3 seconds, Bilateral.  Hair growth absent to the level of the digits, Bilateral.  Edema absent, Bilateral.  Varicosities present, Bilateral. Erythema absent, Bilateral   Sharp dull sensation decreased bilateral plantar aspect feet    Radiographs:       Asessment: Patient is a 80 y.o. male with:    Diagnosis Orders   1. Neuropathy  pregabalin (LYRICA) 75 MG capsule   2. Tinea unguium     3. Prediabetes     4. Pain in left toe(s)     5. Peripheral vascular disease, unspecified ([address]     6. Pain in toe of right foot         Plan: Pt seen for  chronic pain   oarrs report reviewed I have reviewed current medication  and prior noted I believe the need for refill is warranted  I have reviewed oarrs report and found no drug seeking behavior   I have discussed all side effects and narcotic policy   with pt  pt understands and agrees with treatment  a follow up  appointment is made . Current condition and treatment options discussed in detail. Discussed conservative and surgical options with the patient. Advised pt to . Verbal and written instructions given to patient. Contact office with any questions/problems/concerns. RTC in 3month(s). Debridement of all painful nails was performed with both manual and electrical debridement to prevent infection and/or ulceration. Patient tolerated the debridement well, without any complaints.   1-5 bilateral   Patient was asymptomatic after debridement     RX for lyrica 75  Bid   Pt seen for  chronic pain   oarrs report reviewed I have reviewed current medication  and prior noted I believe the need for refill is warranted  I have reviewed oarrs report and found no drug seeking behavior   I have discussed all side effects and narcotic policy   with pt  pt understands and agrees with treatment  a follow up  appointment is made      Electronically signed by Emi Phillips DPM on 9/20/2021 at 11:14 AM  9/20/2021  New prescription for shampoo was given patient is to continue drying feet daily cotton shoes daily patient needs to use Tinactin powder

## 2021-10-14 PROBLEM — Z12.5 PROSTATE CANCER SCREENING: Status: RESOLVED | Noted: 2019-06-14 | Resolved: 2021-10-14

## 2021-10-25 DIAGNOSIS — I10 ESSENTIAL HYPERTENSION: ICD-10-CM

## 2021-10-25 RX ORDER — HYDROCHLOROTHIAZIDE 25 MG/1
TABLET ORAL
Qty: 90 TABLET | Refills: 3 | Status: SHIPPED
Start: 2021-10-25 | End: 2022-10-20

## 2021-10-25 NOTE — TELEPHONE ENCOUNTER
Last Appointment:  9/14/2021  Future Appointments   Date Time Provider Mary Campbellisti   12/20/2021 11:00 AM Reza Dawn DPM Col Podiatry [facility]   3/15/2022  1:00 PM Chong Antoine  [address] [place] [address]

## 2021-11-15 DIAGNOSIS — E66.09 CLASS 1 OBESITY DUE TO EXCESS CALORIES WITH SERIOUS COMORBIDITY AND BODY MASS INDEX (BMI) OF 30.0 TO 30.9 IN ADULT: ICD-10-CM

## 2021-11-15 DIAGNOSIS — I10 ESSENTIAL HYPERTENSION: ICD-10-CM

## 2021-11-15 DIAGNOSIS — I48.0 PAROXYSMAL ATRIAL FIBRILLATION (HCC): ICD-10-CM

## 2021-11-15 DIAGNOSIS — R73.03 PREDIABETES: ICD-10-CM

## 2021-11-15 DIAGNOSIS — E74.39 GLUCOSE INTOLERANCE: ICD-10-CM

## 2021-11-15 DIAGNOSIS — Z79.01 CHRONIC ANTICOAGULATION: ICD-10-CM

## 2021-11-15 RX ORDER — OMEPRAZOLE 20 MG/1
CAPSULE, DELAYED RELEASE ORAL
Qty: 90 CAPSULE | Refills: 3 | Status: SHIPPED | OUTPATIENT
Start: 2021-11-15

## 2021-11-15 NOTE — TELEPHONE ENCOUNTER
Last Appointment:  9/14/2021  Future Appointments   Date Time Provider Mary Wilma   12/20/2021 11:00 AM Hitesh Matias DPM Col Podiatry [facility]   3/15/2022  1:00 PM Aleks Carter  [address] Pt presents with c/o right knee pain after a trip and fall down steps today.  Pt reports she slid down stairs while carrying laundry.  Pt denies head, neck, and back pain.  Pt aox4. +cms

## 2021-11-24 RX ORDER — ATORVASTATIN CALCIUM 10 MG/1
TABLET, FILM COATED ORAL
Qty: 90 TABLET | Refills: 3 | Status: SHIPPED | OUTPATIENT
Start: 2021-11-24

## 2021-11-24 NOTE — TELEPHONE ENCOUNTER
Last Appointment:  9/14/2021  Future Appointments   Date Time Provider Mary Campbellisti   12/20/2021 11:00 AM Jasmyne Guadarrama DPM Col Podiatry [facility]   3/15/2022  1:00 PM Jacob Rahman  [address]

## 2021-12-20 ENCOUNTER — PROCEDURE VISIT (OUTPATIENT)
Dept: PODIATRY | Age: 71
End: 2021-12-20
Payer: MEDICARE

## 2021-12-20 VITALS
WEIGHT: 223 LBS | TEMPERATURE: 98.4 F | DIASTOLIC BLOOD PRESSURE: 70 MMHG | SYSTOLIC BLOOD PRESSURE: 112 MMHG | BODY MASS INDEX: 29.42 KG/M2

## 2021-12-20 DIAGNOSIS — B35.1 TINEA UNGUIUM: ICD-10-CM

## 2021-12-20 DIAGNOSIS — R73.03 PREDIABETES: ICD-10-CM

## 2021-12-20 DIAGNOSIS — I73.9 PERIPHERAL VASCULAR DISEASE, UNSPECIFIED (HCC): ICD-10-CM

## 2021-12-20 DIAGNOSIS — G62.9 NEUROPATHY: Primary | ICD-10-CM

## 2021-12-20 DIAGNOSIS — M79.675 PAIN IN LEFT TOE(S): ICD-10-CM

## 2021-12-20 DIAGNOSIS — M79.674 PAIN IN TOE OF RIGHT FOOT: ICD-10-CM

## 2021-12-20 PROCEDURE — 99213 OFFICE O/P EST LOW 20 MIN: CPT | Performed by: PODIATRIST

## 2021-12-20 PROCEDURE — 11721 DEBRIDE NAIL 6 OR MORE: CPT | Performed by: PODIATRIST

## 2021-12-20 RX ORDER — L-METHYLFOLATE-ALGAE-VIT B12-B6 CAP 3-90.314-2-35 MG 3-90.314-2-35 MG
1 CAP ORAL DAILY
Qty: 60 CAPSULE | Refills: 0 | Status: SHIPPED
Start: 2021-12-20 | End: 2022-06-27 | Stop reason: SDUPTHER

## 2021-12-20 ASSESSMENT — ENCOUNTER SYMPTOMS
EYES NEGATIVE: 1
GASTROINTESTINAL NEGATIVE: 1
RESPIRATORY NEGATIVE: 1

## 2021-12-20 NOTE — PROGRESS NOTES
by mouth daily 2/4/21  Yes Olivia Jackson MD   apixaban (ELIQUIS) 5 MG TABS tablet Take 1 tablet by mouth 2 times daily 10/14/20  Yes Olivia Jackson MD   atorvastatin (LIPITOR) 10 MG tablet TAKE 1 TABLET DAILY 7/13/20  Yes Olivia Jackson MD   loratadine (CLARITIN) 10 MG tablet Take 1 tablet by mouth daily 3/6/20  Yes CECELIA Em - CNP   ibuprofen (ADVIL) 200 MG tablet Take 200 mg by mouth every 6 hours as needed for Pain   Yes Historical Provider, MD       No past surgical history on file. No family history on file. Social History     Tobacco Use    Smoking status: Never Smoker    Smokeless tobacco: Never Used   Substance Use Topics    Alcohol use: Not Currently       Review of Systems   Constitutional: Negative. HENT: Negative. Eyes: Negative. Respiratory: Negative. Cardiovascular: Negative. Gastrointestinal: Negative. Endocrine: Negative. Genitourinary: Negative. Musculoskeletal: Positive for joint swelling. Hematological: Negative. Review of Systems:   History obtained from chart review and the patient  General ROS: negative for - chills, fatigue, fever, night sweats or weight gain  Constitutional: Negative for chills, diaphoresis, fatigue, fever and unexpected weight change. Musculoskeletal: Positive for pain with papulation   . Neurological ROS: negative for - behavioral changes, confusion, headaches or seizures. Negative for weakness and numbness. Dermatological ROS: negative for - mole changes, rash  Cardiovascular: Negative for leg swelling. Gastrointestinal: Negative for constipation, diarrhea, nausea and vomiting.                Lower Extremity Physical Examination:   Vitals:   Vitals:    12/20/21 1059   BP: 112/70   Temp: 98.4 °F (36.9 °C)        Foot Exam    General  General Appearance: appears stated age and healthy   Orientation: alert and oriented to person, place, and time       Right Foot/Ankle     Inspection and Palpation  Skin Exam: maceration, tinea, fissure, skin changes, abnormal color and erythema;     Neurovascular  Dorsalis pedis: 2+  Posterior tibial: absent  Saphenous nerve sensation: diminished  Tibial nerve sensation: diminished  Superficial peroneal nerve sensation: diminished  Deep peroneal nerve sensation: diminished  Sural nerve sensation: diminished      Left Foot/Ankle      Inspection and Palpation  Skin Exam: maceration, tinea, fissure, skin changes, abnormal color and erythema;     Neurovascular  Dorsalis pedis: 2+  Posterior tibial: absent  Saphenous nerve sensation: diminished  Tibial nerve sensation: diminished  Superficial peroneal nerve sensation: diminished  Deep peroneal nerve sensation: diminished  Sural nerve sensation: diminished          Ortho Exam  Toenail Description  Sites of Onychomycosis Involvement (Check affected area)  [x] [x] [x] [x] [x] [x] [x] [x] [x] [x]  5 4 3 2 1 1 2 3 4 5                          Right                                        Left    Thickness  [x] [x] [x] [x] [x] [x] [x] [x] [x] [x]  5 4 3 2 1 1 2 3 4 5                         Right                                        Left    Dystrophic Changes   [x] [x] [x] [x] [x] [x] [x] [x] [x] [x]  5 4 3 2 1 1 2 3 4 5                         Right                                        Left    discolored   [x] [x] [x] [x] [x] [x] [x] [x] [x] [x]  5 4 3 2 1 1 2 3 4 5                          Right                                        Left    Incurvation/Ingrowin   [] [] [x] [x] [x] [x] [x] [x] [x] []  5 4 3 2 1 1 2 3 4 5                         Right                                        Left    Inflammation/Pain   [x] [x] [x] [x] [x] [x] [x] [x] [x] [x]  5 4 3 2 1 1 2 3 4 5                         Right                                        Left  Visual inspection:  Deformity/amputation: absent  Skin lesions/pre-ulcerative calluses: absent  Edema: right- negative, left- negative    Sensory exam:  Monofilament sensation: abnormal - (minimum of 5 random plantar locations tested, avoiding callused areas - > 1 area with absence of sensation is + for neuropathy)    Plus at least one of the following:  Pulses: ,   Pinprick: Impaired  Proprioception: Impaired  Vibration (128 Hz): Intact  General: AAO x 3 in NAD. Dermatologic Exam:  Skin lesion/ulceration Absent . Skin No rashes or nodules noted. .   Musculoskeletal:      , Bilateral.  CFT <3 seconds, Bilateral.  Hair growth absent to the level of the digits, Bilateral.  Edema absent, Bilateral.  Varicosities present, Bilateral. Erythema absent, Bilateral   Sharp dull sensation decreased bilateral plantar aspect feet    Radiographs:       Asessment: Patient is a 80 y.o. male with:    Diagnosis Orders   1. Tinea unguium     2. Prediabetes     3. Pain in left toe(s)     4. Peripheral vascular disease, unspecified ([address]     5. Pain in toe of right foot     6. Neuropathy         Plan: Pt seen for  chronic pain   oarrs report reviewed I have reviewed current medication  and prior noted I believe the need for refill is warranted  I have reviewed oarrs report and found no drug seeking behavior   I have discussed all side effects and narcotic policy   with pt  pt understands and agrees with treatment  a follow up  appointment is made . Current condition and treatment options discussed in detail. Discussed conservative and surgical options with the patient. Advised pt to . Verbal and written instructions given to patient. Contact office with any questions/problems/concerns. RTC in 3month(s). Debridement of all painful nails was performed with both manual and electrical debridement to prevent infection and/or ulceration. Patient tolerated the debridement well, without any complaints.   1-5 bilateral   Patient was asymptomatic after debridement     RX for lyrica 75  Bid   Pt seen for  chronic pain   oarrs report reviewed I have reviewed current medication  and prior noted I believe the need for refill is warranted  I have reviewed oarrs report and found no drug seeking behavior   I have discussed all side effects and narcotic policy   with pt  pt understands and agrees with treatment  a follow up  appointment is made  Metanx rx    Electronically signed by Tara Gonzales DPM on 12/20/2021 at 11:18 AM  12/20/2021  New prescription for shampoo was given patient is to continue drying feet daily cotton shoes daily patient needs to use Tinactin powder

## 2022-01-07 RX ORDER — APIXABAN 5 MG/1
TABLET, FILM COATED ORAL
Qty: 180 TABLET | Refills: 3 | Status: SHIPPED | OUTPATIENT
Start: 2022-01-07

## 2022-02-01 DIAGNOSIS — I48.0 PAROXYSMAL ATRIAL FIBRILLATION (HCC): ICD-10-CM

## 2022-02-01 RX ORDER — METOPROLOL SUCCINATE 50 MG/1
50 TABLET, EXTENDED RELEASE ORAL DAILY
Qty: 90 TABLET | Refills: 3 | Status: SHIPPED | OUTPATIENT
Start: 2022-02-01

## 2022-02-01 NOTE — TELEPHONE ENCOUNTER
Last Appointment:  9/14/2021  Future Appointments   Date Time Provider [place]   3/15/2022  1:00 PM MD Everardo Daugherty [address] [place] [address]   3/21/2022 10:00 AM Opal Ash DPM [facility]iat[facility]

## 2022-03-15 ENCOUNTER — OFFICE VISIT (OUTPATIENT)
Dept: FAMILY MEDICINE CLINIC | Age: 72
End: 2022-03-15
Payer: MEDICARE

## 2022-03-15 VITALS
BODY MASS INDEX: 27.97 KG/M2 | HEART RATE: 96 BPM | SYSTOLIC BLOOD PRESSURE: 130 MMHG | WEIGHT: 212 LBS | OXYGEN SATURATION: 97 % | TEMPERATURE: 98.5 F | DIASTOLIC BLOOD PRESSURE: 70 MMHG

## 2022-03-15 DIAGNOSIS — E74.39 GLUCOSE INTOLERANCE: ICD-10-CM

## 2022-03-15 DIAGNOSIS — E78.2 MIXED HYPERLIPIDEMIA: ICD-10-CM

## 2022-03-15 DIAGNOSIS — I10 ESSENTIAL HYPERTENSION: Primary | ICD-10-CM

## 2022-03-15 DIAGNOSIS — Z79.01 CHRONIC ANTICOAGULATION: ICD-10-CM

## 2022-03-15 DIAGNOSIS — I48.0 PAROXYSMAL ATRIAL FIBRILLATION (HCC): ICD-10-CM

## 2022-03-15 DIAGNOSIS — Z00.00 INITIAL MEDICARE ANNUAL WELLNESS VISIT: ICD-10-CM

## 2022-03-15 DIAGNOSIS — Z00.00 ENCOUNTER FOR ANNUAL WELLNESS EXAM IN MEDICARE PATIENT: Primary | ICD-10-CM

## 2022-03-15 LAB — HBA1C MFR BLD: 6 %

## 2022-03-15 PROCEDURE — 99214 OFFICE O/P EST MOD 30 MIN: CPT | Performed by: INTERNAL MEDICINE

## 2022-03-15 PROCEDURE — G0438 PPPS, INITIAL VISIT: HCPCS | Performed by: INTERNAL MEDICINE

## 2022-03-15 PROCEDURE — 83036 HEMOGLOBIN GLYCOSYLATED A1C: CPT | Performed by: INTERNAL MEDICINE

## 2022-03-15 ASSESSMENT — LIFESTYLE VARIABLES: HOW OFTEN DO YOU HAVE A DRINK CONTAINING ALCOHOL: NEVER

## 2022-03-15 ASSESSMENT — PATIENT HEALTH QUESTIONNAIRE - PHQ9
2. FEELING DOWN, DEPRESSED OR HOPELESS: 0
1. LITTLE INTEREST OR PLEASURE IN DOING THINGS: 0
SUM OF ALL RESPONSES TO PHQ QUESTIONS 1-9: 0
SUM OF ALL RESPONSES TO PHQ9 QUESTIONS 1 & 2: 0
SUM OF ALL RESPONSES TO PHQ QUESTIONS 1-9: 0

## 2022-03-15 NOTE — PROGRESS NOTES
Medicare Annual Wellness Visit    Malinda Foreman is here for 6 Month Follow-Up    Assessment & Plan    Recommendations for Preventive Services Due: see orders and patient instructions/AVS.  Recommended screening schedule for the next 5-10 years is provided to the patient in written form: see Patient Instructions/AVS.     No follow-ups on file. Subjective     Patient's complete Health Risk Assessment and screening values have been reviewed and are found in Flowsheets. The following problems were reviewed today and where indicated follow up appointments were made and/or referrals ordered. Positive Risk Factor Screenings with Interventions:               General Health and ACP:       Advance Directives     Power of  Living Will ACP-Advance Directive ACP-Power of     Not on File Not on File Not on File Not on File      General Health Risk Interventions:  · No Living Will: Patient declines ACP discussion/assistance    Health Habits/Nutrition:     Physical Activity: Inactive    Days of Exercise per Week: 0 days    Minutes of Exercise per Session: 0 min                Health Habits/Nutrition Interventions:  · Inadequate physical activity:  patient is not ready to increase his/her physical activity level at this time    Hearing/Vision:           No exam data present    Hearing/Vision Interventions:  · No issues            Objective   Vitals:    03/15/22 1301   BP: 130/70   Pulse: 96   Temp: 98.5 °F (36.9 °C)   SpO2: 97%   Weight: 212 lb (96.2 kg)      Body mass index is 27.97 kg/m². No Known Allergies  Prior to Visit Medications    Medication Sig Taking?  Authorizing Provider   metoprolol succinate (TOPROL XL) 50 MG extended release tablet Take 1 tablet by mouth daily Yes Sonali Rollins MD   ELIQUIS 5 MG TABS tablet TAKE 1 TABLET TWICE A DAY Yes Sonali Rollins MD   atorvastatin (LIPITOR) 10 MG tablet TAKE 1 TABLET DAILY Yes Sonali Rollins MD   omeprazole (PRILOSEC) 20 MG delayed release capsule TAKE 1 CAPSULE DAILY Yes Jean Rodriges MD   pregabalin (LYRICA) 75 MG capsule Take 1 capsule by mouth 2 times daily for 60 days. Yes Gerald Primrose, DPM   amLODIPine-benazepril (LOTREL) 10-20 MG per capsule TAKE 1 CAPSULE DAILY Yes Jean Rodriges MD   [id number] [facility]) 5-02.170-6-60 MG CAPS capsule Take 1 capsule by mouth daily  Gerald Primrose, DPM   hydroCHLOROthiazide (HYDRODIURIL) 25 MG tablet TAKE 1 TABLET DAILY  CECELIA Mirza CNP   ketoconazole (NIZORAL) 2 % shampoo Apply topically daily as needed. Gerald Primrose, DPM   ketoconazole (NIZORAL) 2 % cream Apply topically daily. Gerald Primrose, DPM   atorvastatin (LIPITOR) 10 MG tablet TAKE 1 TABLET DAILY  Jean Rodriges MD   loratadine (CLARITIN) 10 MG tablet Take 1 tablet by mouth daily  CECELIA Schreiber CNP   ibuprofen (ADVIL) 200 MG tablet Take 200 mg by mouth every 6 hours as needed for Pain  Historical Provider, MD Del Castillo (Including outside providers/suppliers regularly involved in providing care):   Patient Care Team:  Jean Rodriges MD as PCP - General (Internal Medicine)  Jean Rodriges MD as PCP - [facility]  Gerald Primrose, DPM as Consulting Physician (Podiatry)    Reviewed and updated this visit:  Dianne Paredes was seen today for 6 month follow-up. Diagnoses and all orders for this visit:    Essential hypertension    Paroxysmal atrial fibrillation (HCC)    Glucose intolerance  -     POCT glycosylated hemoglobin (Hb A1C)    Chronic anticoagulation    Mixed hyperlipidemia  -     Comprehensive Metabolic Panel; Future  -     Lipid Panel; Future         Kimberly Jorgensenimer was seen today for 6 month follow-up. Diagnoses and all orders for this visit:    Essential hypertension    Paroxysmal atrial fibrillation (HCC)    Glucose intolerance  -     POCT glycosylated hemoglobin (Hb A1C)    Chronic anticoagulation    Mixed hyperlipidemia  -     Comprehensive Metabolic Panel;  Future  -     Lipid Panel; Future       Patient lost 11 pounds between his last visit and current visit. He does have some cerumen in the right ear and this will be manually disimpacted. The patient is to follow in 4 months. Unfortunately lots of stressors going on with care of his wife and now his dog has recently been coronary insulin to control diabetes.

## 2022-03-15 NOTE — PROGRESS NOTES
Medicare Annual Wellness Visit    Isis Orta is here for Medicare AW    Assessment & Plan    Recommendations for Preventive Services Due: see orders and patient instructions/AVS.  Recommended screening schedule for the next 5-10 years is provided to the patient in written form: see Patient Instructions/AVS.     No follow-ups on file. Subjective     Patient's complete Health Risk Assessment and screening values have been reviewed and are found in Flowsheets. The following problems were reviewed today and where indicated follow up appointments were made and/or referrals ordered.     Positive Risk Factor Screenings with Interventions:             General Health and ACP:  General  In general, how would you say your health is?: Good  In the past 7 days, have you experienced any of the following: New or Increased Pain, New or Increased Fatigue, Loneliness, Social Isolation, Stress or Anger?: No  Do you get the social and emotional support that you need?: Yes  Do you have a Living Will?: (!) No    Advance Directives     Power of  Living Will ACP-Advance Directive ACP-Power of     Not on File Not on File Not on File Not on File      General Health Risk Interventions:  · No Living Will: Patient declines ACP discussion/assistance    Health Habits/Nutrition:     Physical Activity: Inactive    Days of Exercise per Week: 0 days    Minutes of Exercise per Session: 0 min     Have you lost any weight without trying in the past 3 months?: (!) Yes     Have you seen the dentist within the past year?: (!) No    Health Habits/Nutrition Interventions:  · Inadequate physical activity:  patient is not ready to increase his/her physical activity level at this time    Hearing/Vision:  Do you or your family notice any trouble with your hearing that hasn't been managed with hearing aids?: No  Do you have difficulty driving, watching TV, or doing any of your daily activities because of your eyesight?: No  Have you had an eye exam within the past year?: (!) No  No exam data present    Hearing/Vision Interventions:  · No issues            Objective   There were no vitals filed for this visit. There is no height or weight on file to calculate BMI. No Known Allergies  Prior to Visit Medications    Medication Sig Taking? Authorizing Provider   metoprolol succinate (TOPROL XL) 50 MG extended release tablet Take 1 tablet by mouth daily  Blu Avina MD   ELIQUIS 5 MG TABS tablet TAKE 1 TABLET TWICE A DAY  Blu Avina MD   [id number] [facility]) 9-01.688-0-17 MG CAPS capsule Take 1 capsule by mouth daily  Ridge Weber DPM   atorvastatin (LIPITOR) 10 MG tablet TAKE 1 TABLET DAILY  Blu Avina MD   omeprazole (PRILOSEC) 20 MG delayed release capsule TAKE 1 CAPSULE DAILY  Blu Avina MD   hydroCHLOROthiazide (HYDRODIURIL) 25 MG tablet TAKE 1 TABLET DAILY  CECELIA Heart - CNP   pregabalin (LYRICA) 75 MG capsule Take 1 capsule by mouth 2 times daily for 60 days. Ridge Weber DPM   amLODIPine-benazepril (LOTREL) 10-20 MG per capsule TAKE 1 CAPSULE DAILY  Blu Avina MD   ketoconazole (NIZORAL) 2 % shampoo Apply topically daily as needed. Ridge Weber DPM   ketoconazole (NIZORAL) 2 % cream Apply topically daily. Ridge Weber DPM   atorvastatin (LIPITOR) 10 MG tablet TAKE 1 TABLET DAILY  Blu Avina MD   loratadine (CLARITIN) 10 MG tablet Take 1 tablet by mouth daily  CECELIA Camara - CNP   ibuprofen (ADVIL) 200 MG tablet Take 200 mg by mouth every 6 hours as needed for Pain  Historical Provider, MD [place] Castillo (Including outside providers/suppliers regularly involved in providing care):   Patient Care Team:  Blu Avina MD as PCP - General (Internal Medicine)  Blu Avina MD [facility]  Ridge Weber DPM as Consulting Physician (Podiatry)    Reviewed and updated this visit:       There are no diagnoses linked to this encounter.      Chip Estrella was seen today for medicare awv.     Diagnoses and all orders for this visit:    Encounter for annual wellness exam in Medicare patient

## 2022-03-16 LAB
ALBUMIN SERPL-MCNC: 4.5 G/DL (ref 3.5–5.2)
ALP BLD-CCNC: 82 U/L (ref 40–129)
ALT SERPL-CCNC: 16 U/L (ref 0–40)
ANION GAP SERPL CALCULATED.3IONS-SCNC: 16 MMOL/L (ref 7–16)
AST SERPL-CCNC: 17 U/L (ref 0–39)
BILIRUB SERPL-MCNC: 0.7 MG/DL (ref 0–1.2)
BUN BLDV-MCNC: 23 MG/DL (ref 6–23)
CALCIUM SERPL-MCNC: 9.5 MG/DL (ref 8.6–10.2)
CHLORIDE BLD-SCNC: 98 MMOL/L (ref 98–107)
CHOLESTEROL, TOTAL: 166 MG/DL (ref 0–199)
CO2: 23 MMOL/L (ref 22–29)
CREAT SERPL-MCNC: 1.2 MG/DL (ref 0.7–1.2)
GFR AFRICAN AMERICAN: >60
GFR NON-AFRICAN AMERICAN: 60 ML/MIN/1.73
GLUCOSE BLD-MCNC: 94 MG/DL (ref 74–99)
HDLC SERPL-MCNC: 47 MG/DL
LDL CHOLESTEROL CALCULATED: 92 MG/DL (ref 0–99)
POTASSIUM SERPL-SCNC: 3.8 MMOL/L (ref 3.5–5)
SODIUM BLD-SCNC: 137 MMOL/L (ref 132–146)
TOTAL PROTEIN: 7.8 G/DL (ref 6.4–8.3)
TRIGL SERPL-MCNC: 137 MG/DL (ref 0–149)
VLDLC SERPL CALC-MCNC: 27 MG/DL

## 2022-03-29 ENCOUNTER — OFFICE VISIT (OUTPATIENT)
Dept: PODIATRY | Age: 72
End: 2022-03-29
Payer: MEDICARE

## 2022-03-29 VITALS — HEIGHT: 73 IN | WEIGHT: 212 LBS | BODY MASS INDEX: 28.1 KG/M2

## 2022-03-29 DIAGNOSIS — R73.03 PREDIABETES: ICD-10-CM

## 2022-03-29 DIAGNOSIS — Z79.01 ENCOUNTER FOR CURRENT LONG-TERM USE OF ANTICOAGULANTS: ICD-10-CM

## 2022-03-29 DIAGNOSIS — G60.8 HEREDITARY SENSORY NEUROPATHY: ICD-10-CM

## 2022-03-29 DIAGNOSIS — B35.1 TINEA UNGUIUM: Primary | ICD-10-CM

## 2022-03-29 PROCEDURE — 99999 PR OFFICE/OUTPT VISIT,PROCEDURE ONLY: CPT | Performed by: PODIATRIST

## 2022-03-29 PROCEDURE — 11721 DEBRIDE NAIL 6 OR MORE: CPT | Performed by: PODIATRIST

## 2022-05-11 RX ORDER — ATORVASTATIN CALCIUM 10 MG/1
10 TABLET, FILM COATED ORAL DAILY
Qty: 15 TABLET | Refills: 0 | Status: SHIPPED | OUTPATIENT
Start: 2022-05-11

## 2022-05-11 NOTE — TELEPHONE ENCOUNTER
Vernell Tolliver will be waiting over a week for his shipment of Atorvastatin coming from the mail order. He is asking for a short supply sent to [employer] in [facility]. I have this ready to send.

## 2022-06-27 ENCOUNTER — PROCEDURE VISIT (OUTPATIENT)
Dept: PODIATRY | Age: 72
End: 2022-06-27
Payer: MEDICARE

## 2022-06-27 VITALS — BODY MASS INDEX: 27.97 KG/M2 | WEIGHT: 212 LBS

## 2022-06-27 DIAGNOSIS — G62.9 NEUROPATHY: Primary | ICD-10-CM

## 2022-06-27 DIAGNOSIS — R73.03 PREDIABETES: ICD-10-CM

## 2022-06-27 DIAGNOSIS — M79.674 PAIN IN TOE OF RIGHT FOOT: ICD-10-CM

## 2022-06-27 DIAGNOSIS — B35.1 TINEA UNGUIUM: ICD-10-CM

## 2022-06-27 DIAGNOSIS — Z79.01 ENCOUNTER FOR CURRENT LONG-TERM USE OF ANTICOAGULANTS: ICD-10-CM

## 2022-06-27 DIAGNOSIS — M79.675 PAIN IN LEFT TOE(S): ICD-10-CM

## 2022-06-27 DIAGNOSIS — I73.9 PERIPHERAL VASCULAR DISEASE, UNSPECIFIED (HCC): ICD-10-CM

## 2022-06-27 DIAGNOSIS — G60.8 HEREDITARY SENSORY NEUROPATHY: ICD-10-CM

## 2022-06-27 PROCEDURE — 11721 DEBRIDE NAIL 6 OR MORE: CPT | Performed by: PODIATRIST

## 2022-06-27 PROCEDURE — 99213 OFFICE O/P EST LOW 20 MIN: CPT | Performed by: PODIATRIST

## 2022-06-27 PROCEDURE — 1123F ACP DISCUSS/DSCN MKR DOCD: CPT | Performed by: PODIATRIST

## 2022-06-27 RX ORDER — PREGABALIN 75 MG/1
75 CAPSULE ORAL 3 TIMES DAILY
Qty: 180 CAPSULE | Refills: 0 | Status: SHIPPED | OUTPATIENT
Start: 2022-06-27 | End: 2022-08-26

## 2022-06-27 RX ORDER — KETOCONAZOLE 20 MG/ML
SHAMPOO TOPICAL
Qty: 100 ML | Refills: 5 | Status: SHIPPED | OUTPATIENT
Start: 2022-06-27

## 2022-06-27 RX ORDER — L-METHYLFOLATE-ALGAE-VIT B12-B6 CAP 3-90.314-2-35 MG 3-90.314-2-35 MG
1 CAP ORAL DAILY
Qty: 60 CAPSULE | Refills: 0 | Status: SHIPPED | OUTPATIENT
Start: 2022-06-27

## 2022-06-27 RX ORDER — KETOCONAZOLE 20 MG/G
CREAM TOPICAL
Qty: 30 G | Refills: 5 | Status: SHIPPED | OUTPATIENT
Start: 2022-06-27

## 2022-06-27 ASSESSMENT — ENCOUNTER SYMPTOMS
EYES NEGATIVE: 1
GASTROINTESTINAL NEGATIVE: 1
RESPIRATORY NEGATIVE: 1

## 2022-06-27 NOTE — PROGRESS NOTES
Yes Derryl Lines, DPM   amLODIPine-benazepril (LOTREL) 10-20 MG per capsule TAKE 1 CAPSULE DAILY 9/13/21  Yes Kailey Florence MD   loratadine (CLARITIN) 10 MG tablet Take 1 tablet by mouth daily 3/6/20  Yes CECELIA Wahl - CNP   ibuprofen (ADVIL) 200 MG tablet Take 200 mg by mouth every 6 hours as needed for Pain   Yes Historical Provider, MD   ketoconazole (NIZORAL) 2 % shampoo Apply topically daily as needed. 6/27/22  Yes Derryl Lines, DPM   ketoconazole (NIZORAL) 2 % cream Apply topically daily. 6/27/22  Yes Derryl Lines, DPM       No past surgical history on file. No family history on file. Social History     Tobacco Use    Smoking status: Never Smoker    Smokeless tobacco: Never Used   Substance Use Topics    Alcohol use: Not Currently       Review of Systems   Constitutional: Negative. HENT: Negative. Eyes: Negative. Respiratory: Negative. Cardiovascular: Negative. Gastrointestinal: Negative. Endocrine: Negative. Genitourinary: Negative. Musculoskeletal: Positive for joint swelling. Hematological: Negative. Review of Systems:   History obtained from chart review and the patient  General ROS: negative for - chills, fatigue, fever, night sweats or weight gain  Constitutional: Negative for chills, diaphoresis, fatigue, fever and unexpected weight change. Musculoskeletal: Positive for pain with papulation   . Neurological ROS: negative for - behavioral changes, confusion, headaches or seizures. Negative for weakness and numbness. Dermatological ROS: negative for - mole changes, rash  Cardiovascular: Negative for leg swelling. Gastrointestinal: Negative for constipation, diarrhea, nausea and vomiting. Lower Extremity Physical Examination:   Vitals: There were no vitals filed for this visit.      Foot Exam    General  General Appearance: appears stated age and healthy   Orientation: alert and oriented to person, place, and time       Right Foot/Ankle     Inspection and Palpation  Skin Exam: maceration, tinea, fissure, skin changes, abnormal color and erythema;     Neurovascular  Dorsalis pedis: 2+  Posterior tibial: absent  Saphenous nerve sensation: diminished  Tibial nerve sensation: diminished  Superficial peroneal nerve sensation: diminished  Deep peroneal nerve sensation: diminished  Sural nerve sensation: diminished      Left Foot/Ankle      Inspection and Palpation  Skin Exam: maceration, tinea, fissure, skin changes, abnormal color and erythema;     Neurovascular  Dorsalis pedis: 2+  Posterior tibial: absent  Saphenous nerve sensation: diminished  Tibial nerve sensation: diminished  Superficial peroneal nerve sensation: diminished  Deep peroneal nerve sensation: diminished  Sural nerve sensation: diminished          Ortho Exam  Toenail Description  Sites of Onychomycosis Involvement (Check affected area)  [x] [x] [x] [x] [x] [x] [x] [x] [x] [x]  5 4 3 2 1 1 2 3 4 5                          Right                                        Left    Thickness  [x] [x] [x] [x] [x] [x] [x] [x] [x] [x]  5 4 3 2 1 1 2 3 4 5                         Right                                        Left    Dystrophic Changes   [x] [x] [x] [x] [x] [x] [x] [x] [x] [x]  5 4 3 2 1 1 2 3 4 5                         Right                                        Left    discolored   [x] [x] [x] [x] [x] [x] [x] [x] [x] [x]  5 4 3 2 1 1 2 3 4 5                          Right                                        Left    Incurvation/Ingrowin   [] [] [x] [x] [x] [x] [x] [x] [x] []  5 4 3 2 1 1 2 3 4 5                         Right                                        Left    Inflammation/Pain   [x] [x] [x] [x] [x] [x] [x] [x] [x] [x]  5 4 3 2 1 1 2 3 4 5                         Right                                        Left  Visual inspection:  Deformity/amputation: absent  Skin lesions/pre-ulcerative calluses: absent  Edema: right- negative, left- negative    Sensory exam:  Monofilament sensation: abnormal -   (minimum of 5 random plantar locations tested, avoiding callused areas - > 1 area with absence of sensation is + for neuropathy)    Plus at least one of the following:  Pulses: ,   Pinprick: Impaired  Proprioception: Impaired  Vibration (128 Hz): Intact  General: AAO x 3 in NAD. Dermatologic Exam:  Skin lesion/ulceration Absent . Skin No rashes or nodules noted. .   Musculoskeletal:      , Bilateral.  CFT <3 seconds, Bilateral.  Hair growth absent to the level of the digits, Bilateral.  Edema absent, Bilateral.  Varicosities present, Bilateral. Erythema absent, Bilateral   Sharp dull sensation decreased bilateral plantar aspect feet    Radiographs:       Asessment: Patient is a 71 y.o. male with:    Diagnosis Orders   1. Tinea unguium     2. Encounter for current long-term use of anticoagulants     3. Prediabetes     4. Pain in left toe(s)     5. Peripheral vascular disease, unspecified ([facility] [address]     6. Pain in toe of right foot     7. Neuropathy     8. Hereditary sensory neuropathy         Plan: Pt seen for  chronic pain   oarrs report reviewed I have reviewed current medication  and prior noted I believe the need for refill is warranted  I have reviewed oarrs report and found no drug seeking behavior   I have discussed all side effects and narcotic policy   with pt  pt understands and agrees with treatment  a follow up  appointment is made . Current condition and treatment options discussed in detail. Discussed conservative and surgical options with the patient. Advised pt to . Verbal and written instructions given to patient. Contact office with any questions/problems/concerns. RTC in 3month(s). Debridement of all painful nails was performed with both manual and electrical debridement to prevent infection and/or ulceration. Patient tolerated the debridement well, without any complaints.   1-5 bilateral   Patient was asymptomatic after debridement     RX for lyrica 75  tid   Pt seen for  chronic pain   oarrs report reviewed I have reviewed current medication  and prior noted I believe the need for refill is warranted  I have reviewed oarrs report and found no drug seeking behavior   I have discussed all side effects and narcotic policy   with pt  pt understands and agrees with treatment  a follow up  appointment is made  Metanx rx    Electronically signed by Warden Alanis DPM on 6/27/2022 at 10:08 AM  6/27/2022  New prescription for shampoo was given patient is to continue drying feet daily cotton shoes daily patient needs to use Tinactin powder

## 2022-07-19 ENCOUNTER — OFFICE VISIT (OUTPATIENT)
Dept: FAMILY MEDICINE CLINIC | Age: 72
End: 2022-07-19
Payer: MEDICARE

## 2022-07-19 VITALS
OXYGEN SATURATION: 98 % | HEART RATE: 91 BPM | BODY MASS INDEX: 26.91 KG/M2 | DIASTOLIC BLOOD PRESSURE: 70 MMHG | WEIGHT: 204 LBS | TEMPERATURE: 99.1 F | SYSTOLIC BLOOD PRESSURE: 120 MMHG

## 2022-07-19 DIAGNOSIS — Z79.01 CHRONIC ANTICOAGULATION: ICD-10-CM

## 2022-07-19 DIAGNOSIS — I48.0 PAROXYSMAL ATRIAL FIBRILLATION (HCC): ICD-10-CM

## 2022-07-19 DIAGNOSIS — R63.4 WEIGHT LOSS: ICD-10-CM

## 2022-07-19 DIAGNOSIS — E78.2 MIXED HYPERLIPIDEMIA: ICD-10-CM

## 2022-07-19 DIAGNOSIS — I10 ESSENTIAL HYPERTENSION: Primary | ICD-10-CM

## 2022-07-19 DIAGNOSIS — I10 ESSENTIAL HYPERTENSION: ICD-10-CM

## 2022-07-19 DIAGNOSIS — E74.39 GLUCOSE INTOLERANCE: ICD-10-CM

## 2022-07-19 LAB
ALBUMIN SERPL-MCNC: 4.7 G/DL (ref 3.5–5.2)
ALP BLD-CCNC: 92 U/L (ref 40–129)
ALT SERPL-CCNC: 13 U/L (ref 0–40)
ANION GAP SERPL CALCULATED.3IONS-SCNC: 13 MMOL/L (ref 7–16)
AST SERPL-CCNC: 15 U/L (ref 0–39)
BILIRUB SERPL-MCNC: 0.5 MG/DL (ref 0–1.2)
BUN BLDV-MCNC: 30 MG/DL (ref 6–23)
CALCIUM SERPL-MCNC: 9.7 MG/DL (ref 8.6–10.2)
CHLORIDE BLD-SCNC: 100 MMOL/L (ref 98–107)
CO2: 26 MMOL/L (ref 22–29)
CREAT SERPL-MCNC: 1.4 MG/DL (ref 0.7–1.2)
GFR AFRICAN AMERICAN: >60
GFR NON-AFRICAN AMERICAN: 50 ML/MIN/1.73
GLUCOSE BLD-MCNC: 98 MG/DL (ref 74–99)
POTASSIUM SERPL-SCNC: 4.6 MMOL/L (ref 3.5–5)
SODIUM BLD-SCNC: 139 MMOL/L (ref 132–146)
TOTAL PROTEIN: 7.7 G/DL (ref 6.4–8.3)

## 2022-07-19 PROCEDURE — 99214 OFFICE O/P EST MOD 30 MIN: CPT | Performed by: INTERNAL MEDICINE

## 2022-07-19 PROCEDURE — 1123F ACP DISCUSS/DSCN MKR DOCD: CPT | Performed by: INTERNAL MEDICINE

## 2022-07-19 NOTE — PROGRESS NOTES
2017  AORTIC STENOSIS/PALPITATIONS/DIASTOLIC DYSFUNCTION- REFER TO CARDIOLOGY 3/21/18  Tachycardia- 6/14/2019 holter ordered-ATRIAL FIB with rapid ventricular response-  Weight loss- secondary to wife's illness 7/19/2022  Surgical Hx:  Appendectomy, Tonsillectomy W/ Adenoidectomy, Hernia Repair  Cataract - (2016)  Left Shoulder Surgery - (2016) MICHELE  Reviewed, no changes. FH:  Father:  Rheumatic Heart Disease  Negative For Non Insulin Dependent Diabetes. Mother:  Alzheimer's Disease - FROM ANESTHESIA? PUD  Negative For Non Insulin Dependent Diabetes. Reviewed, no changes. SH:  Marital: . Work Status: autoworker - retired. Personal Habits: Cigarette Use: Negative For current cigarette smoker. Alcohol: Occasionally  consumes alcohol, Occasionally consumes alcohol - last beer 2 years ago. Inna Fanning Exercise Type: exercises  sporadically. Reviewed, no changes. Date:9/14/2021  Was the patient queried about smoking behavior? Yes   Does the patient currently smoke? Smoking: Patient has never smoked. Current Outpatient Medications:     metoprolol succinate (TOPROL XL) 50 MG extended release tablet, Take 1 tablet by mouth daily, Disp: 90 tablet, Rfl: 3    ELIQUIS 5 MG TABS tablet, TAKE 1 TABLET TWICE A DAY, Disp: 180 tablet, Rfl: 3    atorvastatin (LIPITOR) 10 MG tablet, TAKE 1 TABLET DAILY, Disp: 90 tablet, Rfl: 3    omeprazole (PRILOSEC) 20 MG delayed release capsule, TAKE 1 CAPSULE DAILY, Disp: 90 capsule, Rfl: 3    hydroCHLOROthiazide (HYDRODIURIL) 25 MG tablet, TAKE 1 TABLET DAILY, Disp: 90 tablet, Rfl: 3    amLODIPine-benazepril (LOTREL) 10-20 MG per capsule, TAKE 1 CAPSULE DAILY, Disp: 90 capsule, Rfl: 3    L-methylfolate-B6-B12 (METANX) 4-16.318-5-20 MG CAPS capsule, Take 1 capsule by mouth daily, Disp: 60 capsule, Rfl: 0    ketoconazole (NIZORAL) 2 % shampoo, Apply topically daily as needed. , Disp: 100 mL, Rfl: 5    ketoconazole (NIZORAL) 2 % cream, Apply topically daily. , Disp: 30 g, Rfl: 5 pregabalin (LYRICA) 75 MG capsule, Take 1 capsule by mouth 3 times daily for 60 days. (Patient not taking: Reported on 7/19/2022), Disp: 180 capsule, Rfl: 0    atorvastatin (LIPITOR) 10 MG tablet, Take 1 tablet by mouth daily, Disp: 15 tablet, Rfl: 0    loratadine (CLARITIN) 10 MG tablet, Take 1 tablet by mouth daily, Disp: 30 tablet, Rfl: 0    ibuprofen (ADVIL) 200 MG tablet, Take 200 mg by mouth every 6 hours as needed for Pain, Disp: , Rfl:   No Known Allergies    No past medical history on file. No past surgical history on file. No family history on file. Social History     Socioeconomic History    Marital status:      Spouse name: Not on file    Number of children: Not on file    Years of education: Not on file    Highest education level: Not on file   Occupational History    Not on file   Tobacco Use    Smoking status: Never    Smokeless tobacco: Never   Vaping Use    Vaping Use: Never used   Substance and Sexual Activity    Alcohol use: Not Currently    Drug use: Never    Sexual activity: Yes     Partners: Female   Other Topics Concern    Not on file   Social History Narrative    Not on file     Social Determinants of Health     Financial Resource Strain: Not on file   Food Insecurity: Not on file   Transportation Needs: Not on file   Physical Activity: Inactive    Days of Exercise per Week: 0 days    Minutes of Exercise per Session: 0 min   Stress: Not on file   Social Connections: Not on file   Intimate Partner Violence: Not on file   Housing Stability: Not on file       Vitals:    07/19/22 1336   BP: 120/70   Pulse: 91   Temp: 99.1 °F (37.3 °C)   SpO2: 98%   Weight: 204 lb (92.5 kg)       Physical Exam  Exam:  Const: Appears healthy,well developed and well nourished. Appears severely overweight. Eyes: EOMI in both eyes. PERRL. ENMT: External canals are clear and dry. Tympanic membranes are intact. External nose WNL. Slight cerumen buildup on the right ear canal.  Neck: Supple and symmetric. Palpation reveals no adenopathy. No masses appreciated. Thyroid  exhibits no nodules or thyromegaly. No JVD. Resp: Respirations are unlabored. Respiration rate is normal. Auscultate good airflow. No rales,  rhonchi or wheezes appreciated over the lungs bilaterally. CV: Rhythm is regular. S1 is normal. S2 is normal. No S4 gallop. Grade 3/6, systolic murmur. Carotids: no bruits. Abdominal aorta is not palpable. Pedal pulses: 2+ and equal bilaterally. Extremities: No clubbing, cyanosis or edema. Abdomen: Bowel sounds are normoactive. Palpation of the abdomen reveals softness, but no  distension, organomegaly or tenderness. No abdominal masses. No palpable hepatosplenomegaly. Musculo: Walks with a normal gait. Upper Extremities: ROM is physiologic. Lower Extremities: Full  ROM bilaterally. Skin: Dry and warm with no rash. Neuro: Alert and oriented x3. Mood is normal. Affect is normal. Speech is articulate and fluent. Reflexes: Equal patellar reflex bilaterally, diminished plantar reflex on the right compared to the left. Psych: Patient's attitude is cooperative. Patient's affect is appropriate. Judgement is realistic. Insight  is appropriate. Constanza Martinez was seen today for 6 month follow-up. Diagnoses and all orders for this visit:    Essential hypertension  -     Comprehensive Metabolic Panel; Future    Paroxysmal atrial fibrillation (HCC)  -     Comprehensive Metabolic Panel; Future    Chronic anticoagulation  -     Comprehensive Metabolic Panel; Future    Mixed hyperlipidemia  -     Comprehensive Metabolic Panel; Future    Glucose intolerance    Weight loss  Patient is to have lab work today. I have encouraged him to seek help from his children and also other agency services to see if this might lessen his struggle with all these extra duties. I will see him back in 3 to 4 months.

## 2022-07-20 DIAGNOSIS — I10 ESSENTIAL HYPERTENSION: Primary | ICD-10-CM

## 2022-09-08 RX ORDER — AMLODIPINE BESYLATE AND BENAZEPRIL HYDROCHLORIDE 10; 20 MG/1; MG/1
CAPSULE ORAL
Qty: 90 CAPSULE | Refills: 3 | Status: SHIPPED | OUTPATIENT
Start: 2022-09-08

## 2022-09-08 NOTE — TELEPHONE ENCOUNTER
Last Appointment:  7/19/2022  Future Appointments   Date Time Provider Mary Dillon   9/26/2022 10:15 AM Joy Cifuentes DPM Col Podiatry [facility]   11/16/2022 11:30 AM Rc Gallardo  [address] [place] [address]

## 2022-09-12 DIAGNOSIS — I10 ESSENTIAL HYPERTENSION: ICD-10-CM

## 2022-09-12 LAB
ANION GAP SERPL CALCULATED.3IONS-SCNC: 12 MMOL/L (ref 7–16)
BUN BLDV-MCNC: 19 MG/DL (ref 6–23)
CALCIUM SERPL-MCNC: 9.4 MG/DL (ref 8.6–10.2)
CHLORIDE BLD-SCNC: 102 MMOL/L (ref 98–107)
CO2: 25 MMOL/L (ref 22–29)
CREAT SERPL-MCNC: 1.1 MG/DL (ref 0.7–1.2)
GFR AFRICAN AMERICAN: >60
GFR NON-AFRICAN AMERICAN: >60 ML/MIN/1.73
GLUCOSE BLD-MCNC: 115 MG/DL (ref 74–99)
POTASSIUM SERPL-SCNC: 4.3 MMOL/L (ref 3.5–5)
SODIUM BLD-SCNC: 139 MMOL/L (ref 132–146)

## 2022-10-20 DIAGNOSIS — I10 ESSENTIAL HYPERTENSION: ICD-10-CM

## 2022-10-20 RX ORDER — HYDROCHLOROTHIAZIDE 25 MG/1
TABLET ORAL
Qty: 90 TABLET | Refills: 3 | Status: SHIPPED | OUTPATIENT
Start: 2022-10-20

## 2022-10-20 NOTE — TELEPHONE ENCOUNTER
Last Appointment:  Visit date not found  Future Appointments   Date Time Provider Mary Dillon   10/31/2022  2:15 PM Angy Hernandez DPM Col Podiatry [facility]   11/16/2022 11:30 AM Victorino Scales  [address]     Reached out to patient to reschedule his appointment with Dr Francia Hughes. Patient said it wasn't a good time for him and we would have to call back.

## 2022-11-10 DIAGNOSIS — I10 ESSENTIAL HYPERTENSION: ICD-10-CM

## 2022-11-10 DIAGNOSIS — Z79.01 CHRONIC ANTICOAGULATION: ICD-10-CM

## 2022-11-10 DIAGNOSIS — E74.39 GLUCOSE INTOLERANCE: ICD-10-CM

## 2022-11-10 DIAGNOSIS — R73.03 PREDIABETES: ICD-10-CM

## 2022-11-10 DIAGNOSIS — I48.0 PAROXYSMAL ATRIAL FIBRILLATION (HCC): ICD-10-CM

## 2022-11-10 DIAGNOSIS — E66.09 CLASS 1 OBESITY DUE TO EXCESS CALORIES WITH SERIOUS COMORBIDITY AND BODY MASS INDEX (BMI) OF 30.0 TO 30.9 IN ADULT: ICD-10-CM

## 2022-11-10 NOTE — TELEPHONE ENCOUNTER
Last Appointment:  7/19/2022  Future Appointments   Date Time Provider Mary Dillon   11/16/2022 11:30 AM Kalyn Shukla  [address] [place] [address]

## 2022-11-11 RX ORDER — OMEPRAZOLE 20 MG/1
CAPSULE, DELAYED RELEASE ORAL
Qty: 90 CAPSULE | Refills: 3 | Status: SHIPPED | OUTPATIENT
Start: 2022-11-11

## 2022-11-21 RX ORDER — ATORVASTATIN CALCIUM 10 MG/1
TABLET, FILM COATED ORAL
Qty: 90 TABLET | Refills: 3 | Status: SHIPPED | OUTPATIENT
Start: 2022-11-21

## 2022-11-21 NOTE — TELEPHONE ENCOUNTER
Last Appointment:  7/19/2022  Future Appointments   Date Time Provider Mary Dillon   11/30/2022 10:00 AM Drew Luong  [address] [place] [address]

## 2022-11-30 ENCOUNTER — OFFICE VISIT (OUTPATIENT)
Dept: FAMILY MEDICINE CLINIC | Age: 72
End: 2022-11-30
Payer: MEDICARE

## 2022-11-30 VITALS
HEART RATE: 88 BPM | OXYGEN SATURATION: 96 % | WEIGHT: 201 LBS | DIASTOLIC BLOOD PRESSURE: 62 MMHG | BODY MASS INDEX: 26.52 KG/M2 | SYSTOLIC BLOOD PRESSURE: 116 MMHG | TEMPERATURE: 97.4 F

## 2022-11-30 DIAGNOSIS — I48.0 PAROXYSMAL ATRIAL FIBRILLATION (HCC): ICD-10-CM

## 2022-11-30 DIAGNOSIS — Z23 FLU VACCINE NEED: Primary | ICD-10-CM

## 2022-11-30 DIAGNOSIS — I10 ESSENTIAL HYPERTENSION: ICD-10-CM

## 2022-11-30 PROCEDURE — 1123F ACP DISCUSS/DSCN MKR DOCD: CPT | Performed by: STUDENT IN AN ORGANIZED HEALTH CARE EDUCATION/TRAINING PROGRAM

## 2022-11-30 PROCEDURE — 99214 OFFICE O/P EST MOD 30 MIN: CPT | Performed by: STUDENT IN AN ORGANIZED HEALTH CARE EDUCATION/TRAINING PROGRAM

## 2022-11-30 PROCEDURE — 90694 VACC AIIV4 NO PRSRV 0.5ML IM: CPT | Performed by: STUDENT IN AN ORGANIZED HEALTH CARE EDUCATION/TRAINING PROGRAM

## 2022-11-30 PROCEDURE — 3078F DIAST BP <80 MM HG: CPT | Performed by: STUDENT IN AN ORGANIZED HEALTH CARE EDUCATION/TRAINING PROGRAM

## 2022-11-30 PROCEDURE — 3074F SYST BP LT 130 MM HG: CPT | Performed by: STUDENT IN AN ORGANIZED HEALTH CARE EDUCATION/TRAINING PROGRAM

## 2022-11-30 PROCEDURE — G0008 ADMIN INFLUENZA VIRUS VAC: HCPCS | Performed by: STUDENT IN AN ORGANIZED HEALTH CARE EDUCATION/TRAINING PROGRAM

## 2022-11-30 RX ORDER — METOPROLOL SUCCINATE 50 MG/1
50 TABLET, EXTENDED RELEASE ORAL DAILY
Qty: 90 TABLET | Refills: 3 | Status: SHIPPED | OUTPATIENT
Start: 2022-11-30

## 2022-11-30 SDOH — ECONOMIC STABILITY: FOOD INSECURITY: WITHIN THE PAST 12 MONTHS, YOU WORRIED THAT YOUR FOOD WOULD RUN OUT BEFORE YOU GOT MONEY TO BUY MORE.: NEVER TRUE

## 2022-11-30 SDOH — ECONOMIC STABILITY: FOOD INSECURITY: WITHIN THE PAST 12 MONTHS, THE FOOD YOU BOUGHT JUST DIDN'T LAST AND YOU DIDN'T HAVE MONEY TO GET MORE.: NEVER TRUE

## 2022-11-30 ASSESSMENT — ENCOUNTER SYMPTOMS
VOMITING: 0
RHINORRHEA: 0
SHORTNESS OF BREATH: 0
COUGH: 0
NAUSEA: 0
ABDOMINAL PAIN: 0

## 2022-11-30 ASSESSMENT — SOCIAL DETERMINANTS OF HEALTH (SDOH): HOW HARD IS IT FOR YOU TO PAY FOR THE VERY BASICS LIKE FOOD, HOUSING, MEDICAL CARE, AND HEATING?: NOT HARD AT ALL

## 2022-11-30 NOTE — PROGRESS NOTES
[facility]  Office Progress Note  Dr. Jared Zacarias      Patient:  Rebel Trejo 71 y.o. male     Date of Service: 11/30/22      Chief complaint:   Chief Complaint   Patient presents with    Hypertension    Cholesterol Problem         History of Present Illness   The patient is a 71 y.o. male  here to follow up of their HTN and HLD    He has no acute complaints today    HTN and HLD. Denies chest pain, shortness of breath, leg swelling, headache, vision changes and orthopnea. Tolerates the medication well. Not light headed or dizzy    BP Readings from Last 3 Encounters:   11/30/22 116/62   07/19/22 120/70   03/15/22 130/70     The 10-year ASCVD risk score (Reji GALLARDO, et al., 2019) is: 17.9%    Values used to calculate the score:      Age: 71 years      Sex: Male      Is Non- : No      Diabetic: No      Tobacco smoker: No      Systolic Blood Pressure: 981 mmHg      Is BP treated: Yes      HDL Cholesterol: 47 mg/dL      Total Cholesterol: 166 mg/dL    A fib-has some valvular issues as well. On eliquis. On chart review Pcp has discussed with cardiology. No bleeding issues. Rate controlled. No palpitations or chest pain/SOB. Past Medical History:  No past medical history on file. Review of Systems:   Review of Systems   Constitutional:  Negative for chills and fever. HENT:  Negative for congestion and rhinorrhea. Respiratory:  Negative for cough and shortness of breath. Cardiovascular:  Negative for chest pain and leg swelling. Gastrointestinal:  Negative for abdominal pain, nausea and vomiting. Genitourinary:  Negative for dysuria and hematuria. Musculoskeletal:  Negative for arthralgias and myalgias. Skin:  Negative for rash and wound. Neurological:  Negative for dizziness and light-headedness.      Physical Exam   Vitals: /62   Pulse 88   Temp 97.4 °F (36.3 °C)   Wt 201 lb (91.2 kg)   SpO2 96%   BMI 26.52 kg/m²   Physical Exam    General: Well developed, well nourished, well groomed. No apparent distress. HEENT:  Normocephalic, atraumatic. No scleral icterus. No conjunctival injection. No nasal discharge. Heart:  RRR, no murmurs, gallops, or rubs  Lungs:  CTA bilaterally, bilat symmetrical expansion, no wheeze, rales, or rhonchi  Abdomen: Bowel sounds present, soft, nontender, no masses, no organomegaly, no peritoneal signs  Extremities:  No clubbing, cyanosis, or edema  Neuro:  Alert and oriented x3, no focal deficits      Assessment and Plan   Flu vaccine given today  He is in rhythm today on exam. Continue current anticoagulation and rate his PCP has had him on for some time now. Not having any issues  Blood pressure is at goal. Follow up with PCP in 3-4 months    1. Paroxysmal atrial fibrillation (HCC)    - apixaban (ELIQUIS) 5 MG TABS tablet; TAKE 1 TABLET TWICE A DAY  Dispense: 180 tablet; Refill: 3  - metoprolol succinate (TOPROL XL) 50 MG extended release tablet; Take 1 tablet by mouth daily  Dispense: 90 tablet; Refill: 3    2. Flu vaccine need    - Influenza, FLUAD, (age 72 y+), IM, Preservative Free, 0.5 mL    3. Essential hypertension        Counseled regarding above diagnosis, including possible risks and complications,  especially if left uncontrolled. Counseled regarding the possible side effects, risks, benefits and alternatives to treatment;patient and/or guardian verbalizes understanding, agrees, feels comfortable with and wishes to proceed with above treatment plan. Call or go to ED immediately if symptoms worsen or persist. Advised patient to call with any new medication issues, and, as applicable, read all Rx info from pharmacy to assure aware of all possible risks and side effects of medicationbefore taking. Patient and/or guardian given opportunity to ask questions/raise concerns. The patient verbalized comfort and understanding ofinstructions. Return to Office: No follow-ups on file.     Medication List: Current Outpatient Medications   Medication Sig Dispense Refill    apixaban (ELIQUIS) 5 MG TABS tablet TAKE 1 TABLET TWICE A  tablet 3    metoprolol succinate (TOPROL XL) 50 MG extended release tablet Take 1 tablet by mouth daily 90 tablet 3    atorvastatin (LIPITOR) 10 MG tablet TAKE 1 TABLET DAILY 90 tablet 3    omeprazole (PRILOSEC) 20 MG delayed release capsule TAKE 1 CAPSULE DAILY 90 capsule 3    hydroCHLOROthiazide (HYDRODIURIL) 25 MG tablet TAKE 1 TABLET DAILY 90 tablet 3    amLODIPine-benazepril (LOTREL) 10-20 MG per capsule TAKE 1 CAPSULE DAILY 90 capsule 3    L-methylfolate-B6-B12 (METANX) 5-19.951-8-11 MG CAPS capsule Take 1 capsule by mouth daily 60 capsule 0    ketoconazole (NIZORAL) 2 % shampoo Apply topically daily as needed. 100 mL 5    ketoconazole (NIZORAL) 2 % cream Apply topically daily. 30 g 5    loratadine (CLARITIN) 10 MG tablet Take 1 tablet by mouth daily 30 tablet 0    ibuprofen (ADVIL;MOTRIN) 200 MG tablet Take 200 mg by mouth every 6 hours as needed for Pain       No current facility-administered medications for this visit. Lizzy Luna MD     This document may have been prepared at least partially through the use of voice recognition software. Although effort is taken to assure the accuracy ofthis document, it is possible that grammatical, syntax, or spelling errors may occur.

## 2023-01-31 DIAGNOSIS — I48.0 PAROXYSMAL ATRIAL FIBRILLATION (HCC): ICD-10-CM

## 2023-02-01 RX ORDER — METOPROLOL SUCCINATE 50 MG/1
TABLET, EXTENDED RELEASE ORAL
Qty: 90 TABLET | Refills: 0 | Status: SHIPPED | OUTPATIENT
Start: 2023-02-01

## 2023-02-01 NOTE — TELEPHONE ENCOUNTER
Last Appointment:  7/19/2022  Future Appointments   Date Time Provider Mary Wilma   2/15/2023  8:30 AM Charles Butler DPM Col Podiatry [facility]   3/29/2023 10:00 AM Duane Mckee  [address] [place] [address]

## 2023-02-15 ENCOUNTER — PROCEDURE VISIT (OUTPATIENT)
Dept: PODIATRY | Age: 73
End: 2023-02-15

## 2023-02-15 VITALS
TEMPERATURE: 97.2 F | DIASTOLIC BLOOD PRESSURE: 81 MMHG | SYSTOLIC BLOOD PRESSURE: 121 MMHG | WEIGHT: 201 LBS | BODY MASS INDEX: 26.52 KG/M2

## 2023-02-15 DIAGNOSIS — G60.8 HEREDITARY SENSORY NEUROPATHY: ICD-10-CM

## 2023-02-15 DIAGNOSIS — I73.9 PERIPHERAL VASCULAR DISEASE, UNSPECIFIED (HCC): ICD-10-CM

## 2023-02-15 DIAGNOSIS — B35.1 TINEA UNGUIUM: ICD-10-CM

## 2023-02-15 DIAGNOSIS — M79.675 PAIN IN LEFT TOE(S): ICD-10-CM

## 2023-02-15 DIAGNOSIS — Z79.01 ENCOUNTER FOR CURRENT LONG-TERM USE OF ANTICOAGULANTS: ICD-10-CM

## 2023-02-15 DIAGNOSIS — M79.674 PAIN IN TOE OF RIGHT FOOT: ICD-10-CM

## 2023-02-15 DIAGNOSIS — G62.9 NEUROPATHY: Primary | ICD-10-CM

## 2023-02-15 DIAGNOSIS — R73.03 PREDIABETES: ICD-10-CM

## 2023-02-15 RX ORDER — PREGABALIN 100 MG/1
100 CAPSULE ORAL 3 TIMES DAILY
Qty: 270 CAPSULE | Refills: 0 | Status: SHIPPED | OUTPATIENT
Start: 2023-02-15 | End: 2023-05-16

## 2023-02-15 RX ORDER — L-METHYLFOLATE-ALGAE-VIT B12-B6 CAP 3-90.314-2-35 MG 3-90.314-2-35 MG
1 CAP ORAL DAILY
Qty: 60 CAPSULE | Refills: 0 | Status: SHIPPED | OUTPATIENT
Start: 2023-02-15

## 2023-02-15 ASSESSMENT — ENCOUNTER SYMPTOMS
EYES NEGATIVE: 1
GASTROINTESTINAL NEGATIVE: 1
RESPIRATORY NEGATIVE: 1

## 2023-02-15 NOTE — PROGRESS NOTES
[address] [place] [address] [place]   Dept: [phone]  Dept Fax: [phone]    Diabetic  PATIENT PROGRESS NOTE  Date of patient's visit: 2/15/2023  Patient's Name:  Leann Barrett YOB: 1950            Patient Care Team:  Cecil Mccracken MD as PCP - General (Internal Medicine)  Cecil Mccracken MD as PCP - Empaneled Provider  Cabrera Lemon DPM as Consulting Physician (Podiatry)        Chief Complaint   Patient presents with    Toe Pain     Saw pcp Dr. Deysi oRjas 11/30/2022         HPI:   Leann Barrett is a 68 y.o. male  Patient relates pain is Present. Rash is doing much better as well as pain has reduced in all 5 bilaterally. Currently denies F/C/N/V. Pt's primary care physician is Cecil Mccracken MD last seen and is seen today for a  issue patient is having numbness in both feet this is been progressively lyrica   Did not help    No Known Allergies    No past medical history on file. Prior to Admission medications    Medication Sig Start Date End Date Taking? Authorizing Provider   metoprolol succinate (TOPROL XL) 50 MG extended release tablet TAKE ONE TABLET BY MOUTH EVERY DAY 2/1/23  Yes Cecil Mccracken MD   apixaban (ELIQUIS) 5 MG TABS tablet TAKE 1 TABLET TWICE A DAY 11/30/22  Yes Yulia Bedolla MD   atorvastatin (LIPITOR) 10 MG tablet TAKE 1 TABLET DAILY 11/21/22  Yes Cecil Mccracken MD   omeprazole (PRILOSEC) 20 MG delayed release capsule TAKE 1 CAPSULE DAILY 11/11/22  Yes Cecil Mccracken MD   hydroCHLOROthiazide (HYDRODIURIL) 25 MG tablet TAKE 1 TABLET DAILY 10/20/22  Yes Cecil Mccracken MD   amLODIPine-benazepril (LOTREL) 10-20 MG per capsule TAKE 1 CAPSULE DAILY 9/8/22  Yes Cecil Mccracken MD   K-etlfeeqfotkt-C8-B12 [facility]) 3-65.057-7-53 MG CAPS capsule Take 1 capsule by mouth daily 6/27/22  Yes Cabrera Lemon DPM   ketoconazole (NIZORAL) 2 % shampoo Apply topically daily as needed.  6/27/22  Yes Corina Blend Lc Card DPM   ketoconazole (NIZORAL) 2 % cream Apply topically daily. 6/27/22  Yes Katheryn Tripp DPM   loratadine (CLARITIN) 10 MG tablet Take 1 tablet by mouth daily 3/6/20  Yes Loreta Goodman, APRN - CNP   ibuprofen (ADVIL;MOTRIN) 200 MG tablet Take 200 mg by mouth every 6 hours as needed for Pain   Yes Historical Provider, MD       No past surgical history on file. No family history on file. Social History     Tobacco Use    Smoking status: Never    Smokeless tobacco: Never   Substance Use Topics    Alcohol use: Not Currently       Review of Systems   Constitutional: Negative. HENT: Negative. Eyes: Negative. Respiratory: Negative. Cardiovascular: Negative. Gastrointestinal: Negative. Endocrine: Negative. Genitourinary: Negative. Musculoskeletal:  Positive for joint swelling. Hematological: Negative. Review of Systems:   History obtained from chart review and the patient  General ROS: negative for - chills, fatigue, fever, night sweats or weight gain  Constitutional: Negative for chills, diaphoresis, fatigue, fever and unexpected weight change. Musculoskeletal: Positive for pain with papulation   . Neurological ROS: negative for - behavioral changes, confusion, headaches or seizures. Negative for weakness and numbness. Dermatological ROS: negative for - mole changes, rash  Cardiovascular: Negative for leg swelling. Gastrointestinal: Negative for constipation, diarrhea, nausea and vomiting.                Lower Extremity Physical Examination:   Vitals:   Vitals:    02/15/23 0812   BP: 121/81   Temp: 97.2 °F (36.2 °C)        Foot Exam    General  General Appearance: appears stated age and healthy   Orientation: alert and oriented to person, place, and time       Right Foot/Ankle     Inspection and Palpation  Skin Exam: maceration, tinea, fissure, skin changes, abnormal color and erythema;     Neurovascular  Dorsalis pedis: 2+  Posterior tibial: absent  Saphenous nerve sensation: diminished  Tibial nerve sensation: diminished  Superficial peroneal nerve sensation: diminished  Deep peroneal nerve sensation: diminished  Sural nerve sensation: diminished      Left Foot/Ankle      Inspection and Palpation  Skin Exam: maceration, tinea, fissure, skin changes, abnormal color and erythema;     Neurovascular  Dorsalis pedis: 2+  Posterior tibial: absent  Saphenous nerve sensation: diminished  Tibial nerve sensation: diminished  Superficial peroneal nerve sensation: diminished  Deep peroneal nerve sensation: diminished  Sural nerve sensation: diminished      Ortho Exam  Toenail Description  Sites of Onychomycosis Involvement (Check affected area)  [x] [x] [x] [x] [x] [x] [x] [x] [x] [x]  5 4 3 2 1 1 2 3 4 5                          Right                                        Left    Thickness  [x] [x] [x] [x] [x] [x] [x] [x] [x] [x]  5 4 3 2 1 1 2 3 4 5                         Right                                        Left    Dystrophic Changes   [x] [x] [x] [x] [x] [x] [x] [x] [x] [x]  5 4 3 2 1 1 2 3 4 5                         Right                                        Left    discolored   [x] [x] [x] [x] [x] [x] [x] [x] [x] [x]  5 4 3 2 1 1 2 3 4 5                          Right                                        Left    Incurvation/Ingrowin   [] [] [x] [x] [x] [x] [x] [x] [x] []  5 4 3 2 1 1 2 3 4 5                         Right                                        Left    Inflammation/Pain   [x] [x] [x] [x] [x] [x] [x] [x] [x] [x]  5 4 3 2 1 1 2 3 4 5                         Right                                        Left  Visual inspection:  Deformity/amputation: absent  Skin lesions/pre-ulcerative calluses: absent  Edema: right- negative, left- negative    Sensory exam:  Monofilament sensation: abnormal -   (minimum of 5 random plantar locations tested, avoiding callused areas - > 1 area with absence of sensation is + for neuropathy)    Plus at least one of the following:  Pulses: ,   Pinprick: Impaired  Proprioception: Impaired  Vibration (128 Hz): Intact  General: AAO x 3 in NAD. Dermatologic Exam:  Skin lesion/ulceration Absent . Skin No rashes or nodules noted. .   Musculoskeletal:      , Bilateral.  CFT <3 seconds, Bilateral.  Hair growth absent to the level of the digits, Bilateral.  Edema absent, Bilateral.  Varicosities present, Bilateral. Erythema absent, Bilateral   Sharp dull sensation decreased bilateral plantar aspect feet    Radiographs:       Asessment: Patient is a 68 y.o. male with:    Diagnosis Orders   1. Tinea unguium        2. Encounter for current long-term use of anticoagulants        3. Prediabetes        4. Pain in left toe(s)        5. Peripheral vascular disease, unspecified ([address]        6. Pain in toe of right foot        7. Neuropathy        8. Hereditary sensory neuropathy          Levada Pain was seen today for toe pain. Diagnoses and all orders for this visit:    Neuropathy  -     pregabalin (LYRICA) 100 MG capsule; Take 1 capsule by mouth 3 times daily for 90 days. Max Daily Amount: 300 mg    Tinea unguium    Encounter for current long-term use of anticoagulants    Prediabetes    Pain in left toe(s)    Peripheral vascular disease, unspecified (HCC)    Pain in toe of right foot    Hereditary sensory neuropathy  -     pregabalin (LYRICA) 100 MG capsule; Take 1 capsule by mouth 3 times daily for 90 days. Max Daily Amount: 300 mg    Other orders  -     L-methylfolate-B6-B12 (METANX) 6-95.692-2-13 MG CAPS capsule; Take 1 capsule by mouth daily       Plan: Pt seen for  chronic pain   oarrs report reviewed I have reviewed current medication  and prior noted I believe the need for refill is warranted  I have reviewed oarrs report and found no drug seeking behavior   I have discussed all side effects and narcotic policy   with pt  pt understands and agrees with treatment  a follow up  appointment is made .   Current condition and treatment options discussed in detail. Discussed conservative and surgical options with the patient. Advised pt to . Verbal and written instructions given to patient. Contact office with any questions/problems/concerns. RTC in 3month(s). Debridement of all painful nails was performed with both manual and electrical debridement to prevent infection and/or ulceration. Patient tolerated the debridement well, without any complaints.   1-5 bilateral   Patient was asymptomatic after debridement  will increase lyrica to 100mr tid     Pt seen for  chronic pain   oarrs report reviewed I have reviewed current medication  and prior noted I believe the need for refill is warranted  I have reviewed oarrs report and found no drug seeking behavior   I have discussed all side effects and narcotic policy   with pt  pt understands and agrees with treatment  a follow up  appointment is made  Metanx rx    Electronically signed by Chevis Hodgkin, DPM on 2/15/2023 at 8:23 AM  2/15/2023  New prescription for shampoo was given patient is to continue drying feet daily cotton shoes daily patient needs to use Tinactin powder

## 2023-03-29 ENCOUNTER — TELEPHONE (OUTPATIENT)
Dept: FAMILY MEDICINE CLINIC | Age: 73
End: 2023-03-29

## 2023-03-29 NOTE — TELEPHONE ENCOUNTER
----- Message from Jaylyn Lopezer sent at 3/28/2023  8:52 AM EDT -----  Subject: Message to Provider    QUESTIONS  Information for Provider? Patient had appt for tomorrow that he had to   cancel due to testing positive for covid, can something be called in for   him?  ---------------------------------------------------------------------------  --------------  Danial DUDLEY  [phone]; OK to leave message on voicemail  ---------------------------------------------------------------------------  --------------  SCRIPT ANSWERS  Relationship to Patient?  Self

## 2023-03-30 NOTE — TELEPHONE ENCOUNTER
The patient cannot take Paxlovid because he is on Eliquis. If he worsens or his oxygen saturation goes down, he will need to go to the ER for possible treatment with remdesivir.

## 2023-05-08 ENCOUNTER — TELEPHONE (OUTPATIENT)
Dept: FAMILY MEDICINE CLINIC | Age: 73
End: 2023-05-08

## 2023-05-08 DIAGNOSIS — I48.0 PAROXYSMAL ATRIAL FIBRILLATION (HCC): ICD-10-CM

## 2023-05-08 RX ORDER — METOPROLOL SUCCINATE 50 MG/1
50 TABLET, EXTENDED RELEASE ORAL DAILY
Qty: 90 TABLET | Refills: 3 | Status: SHIPPED
Start: 2023-05-08 | End: 2023-05-08 | Stop reason: SDUPTHER

## 2023-05-08 RX ORDER — METOPROLOL SUCCINATE 50 MG/1
50 TABLET, EXTENDED RELEASE ORAL DAILY
Qty: 30 TABLET | Refills: 0 | Status: SHIPPED | OUTPATIENT
Start: 2023-05-08

## 2023-05-08 NOTE — TELEPHONE ENCOUNTER
Russ Sutton calling in; he is needing refills on his Metoprolol 50 MG. He is wanting to double check that you still want him to take this, and if so, could you send to [address]? And he is also asking for a 30 days sent to [facility] since he is out, to hold him over until the mail order ones arrive to him. Please reach out to patient with any questions.

## 2023-07-11 RX ORDER — AMLODIPINE BESYLATE AND BENAZEPRIL HYDROCHLORIDE 10; 20 MG/1; MG/1
CAPSULE ORAL
Qty: 90 CAPSULE | Refills: 3 | Status: SHIPPED | OUTPATIENT
Start: 2023-07-11

## 2023-07-11 NOTE — TELEPHONE ENCOUNTER
Last Appointment:  7/19/2022  Future Appointments   Date Time Provider [address]   8/1/2023 10:30 AM CECELIA Stahl - CNP [place] [facility]   9/11/2023 11:15 AM Geoff Lee DPM Col Podiatry [facility]

## 2023-08-01 ENCOUNTER — OFFICE VISIT (OUTPATIENT)
Dept: PRIMARY CARE CLINIC | Age: 73
End: 2023-08-01
Payer: MEDICARE

## 2023-08-01 VITALS
OXYGEN SATURATION: 97 % | DIASTOLIC BLOOD PRESSURE: 68 MMHG | HEART RATE: 76 BPM | SYSTOLIC BLOOD PRESSURE: 128 MMHG | BODY MASS INDEX: 28.47 KG/M2 | WEIGHT: 214.8 LBS | HEIGHT: 73 IN | TEMPERATURE: 97.5 F

## 2023-08-01 DIAGNOSIS — R73.03 PREDIABETES: ICD-10-CM

## 2023-08-01 DIAGNOSIS — Z12.5 SCREENING FOR PROSTATE CANCER: ICD-10-CM

## 2023-08-01 DIAGNOSIS — I48.0 PAROXYSMAL ATRIAL FIBRILLATION (HCC): Primary | ICD-10-CM

## 2023-08-01 DIAGNOSIS — I10 ESSENTIAL HYPERTENSION: ICD-10-CM

## 2023-08-01 DIAGNOSIS — I48.0 PAROXYSMAL ATRIAL FIBRILLATION (HCC): ICD-10-CM

## 2023-08-01 LAB
ABSOLUTE IMMATURE GRANULOCYTE: <0.03 K/UL (ref 0–0.58)
ALBUMIN SERPL-MCNC: 4.4 G/DL (ref 3.5–5.2)
ALP BLD-CCNC: 85 U/L (ref 40–129)
ALT SERPL-CCNC: 12 U/L (ref 0–40)
ANION GAP SERPL CALCULATED.3IONS-SCNC: 19 MMOL/L (ref 7–16)
AST SERPL-CCNC: 15 U/L (ref 0–39)
BASOPHILS ABSOLUTE: 0.04 K/UL (ref 0–0.2)
BASOPHILS RELATIVE PERCENT: 1 % (ref 0–2)
BILIRUB SERPL-MCNC: 0.5 MG/DL (ref 0–1.2)
BUN BLDV-MCNC: 23 MG/DL (ref 6–23)
CALCIUM SERPL-MCNC: 9.5 MG/DL (ref 8.6–10.2)
CHLORIDE BLD-SCNC: 103 MMOL/L (ref 98–107)
CHOLESTEROL: 168 MG/DL
CO2: 19 MMOL/L (ref 22–29)
CREAT SERPL-MCNC: 1.2 MG/DL (ref 0.7–1.2)
EOSINOPHILS ABSOLUTE: 0.05 K/UL (ref 0.05–0.5)
EOSINOPHILS RELATIVE PERCENT: 1 % (ref 0–6)
GFR SERPL CREATININE-BSD FRML MDRD: >60 ML/MIN/1.73M2
GLUCOSE BLD-MCNC: 100 MG/DL (ref 74–99)
HBA1C MFR BLD: 5.8 % (ref 4–5.6)
HCT VFR BLD CALC: 49.5 % (ref 37–54)
HDLC SERPL-MCNC: 45 MG/DL
HEMOGLOBIN: 15.2 G/DL (ref 12.5–16.5)
IMMATURE GRANULOCYTES: 0 % (ref 0–5)
LDL CHOLESTEROL: 101 MG/DL
LYMPHOCYTES ABSOLUTE: 2.6 K/UL (ref 1.5–4)
LYMPHOCYTES RELATIVE PERCENT: 33 % (ref 20–42)
MCH RBC QN AUTO: 29.1 PG (ref 26–35)
MCHC RBC AUTO-ENTMCNC: 30.7 G/DL (ref 32–34.5)
MCV RBC AUTO: 94.6 FL (ref 80–99.9)
MONOCYTES ABSOLUTE: 0.7 K/UL (ref 0.1–0.95)
MONOCYTES RELATIVE PERCENT: 9 % (ref 2–12)
NEUTROPHILS ABSOLUTE: 4.48 K/UL (ref 1.8–7.3)
NEUTROPHILS RELATIVE PERCENT: 57 % (ref 43–80)
PDW BLD-RTO: 14 % (ref 11.5–15)
PLATELET # BLD: 199 K/UL (ref 130–450)
PMV BLD AUTO: 11.6 FL (ref 7–12)
POTASSIUM SERPL-SCNC: 4.3 MMOL/L (ref 3.5–5)
PROSTATE SPECIFIC ANTIGEN: 0.63 NG/ML (ref 0–4)
RBC # BLD: 5.23 M/UL (ref 3.8–5.8)
SODIUM BLD-SCNC: 141 MMOL/L (ref 132–146)
TOTAL PROTEIN: 7.7 G/DL (ref 6.4–8.3)
TRIGL SERPL-MCNC: 111 MG/DL
VLDLC SERPL CALC-MCNC: 22 MG/DL
WBC # BLD: 7.9 K/UL (ref 4.5–11.5)

## 2023-08-01 PROCEDURE — 3078F DIAST BP <80 MM HG: CPT | Performed by: NURSE PRACTITIONER

## 2023-08-01 PROCEDURE — 99214 OFFICE O/P EST MOD 30 MIN: CPT | Performed by: NURSE PRACTITIONER

## 2023-08-01 PROCEDURE — 3074F SYST BP LT 130 MM HG: CPT | Performed by: NURSE PRACTITIONER

## 2023-08-01 PROCEDURE — 3017F COLORECTAL CA SCREEN DOC REV: CPT | Performed by: NURSE PRACTITIONER

## 2023-08-01 PROCEDURE — G8427 DOCREV CUR MEDS BY ELIG CLIN: HCPCS | Performed by: NURSE PRACTITIONER

## 2023-08-01 PROCEDURE — G8417 CALC BMI ABV UP PARAM F/U: HCPCS | Performed by: NURSE PRACTITIONER

## 2023-08-01 PROCEDURE — 1123F ACP DISCUSS/DSCN MKR DOCD: CPT | Performed by: NURSE PRACTITIONER

## 2023-08-01 PROCEDURE — 1036F TOBACCO NON-USER: CPT | Performed by: NURSE PRACTITIONER

## 2023-08-01 SDOH — ECONOMIC STABILITY: HOUSING INSECURITY
IN THE LAST 12 MONTHS, WAS THERE A TIME WHEN YOU DID NOT HAVE A STEADY PLACE TO SLEEP OR SLEPT IN A SHELTER (INCLUDING NOW)?: NO

## 2023-08-01 SDOH — ECONOMIC STABILITY: INCOME INSECURITY: HOW HARD IS IT FOR YOU TO PAY FOR THE VERY BASICS LIKE FOOD, HOUSING, MEDICAL CARE, AND HEATING?: NOT HARD AT ALL

## 2023-08-01 SDOH — ECONOMIC STABILITY: FOOD INSECURITY: WITHIN THE PAST 12 MONTHS, THE FOOD YOU BOUGHT JUST DIDN'T LAST AND YOU DIDN'T HAVE MONEY TO GET MORE.: NEVER TRUE

## 2023-08-01 SDOH — ECONOMIC STABILITY: FOOD INSECURITY: WITHIN THE PAST 12 MONTHS, YOU WORRIED THAT YOUR FOOD WOULD RUN OUT BEFORE YOU GOT MONEY TO BUY MORE.: NEVER TRUE

## 2023-08-01 ASSESSMENT — ANXIETY QUESTIONNAIRES
IF YOU CHECKED OFF ANY PROBLEMS ON THIS QUESTIONNAIRE, HOW DIFFICULT HAVE THESE PROBLEMS MADE IT FOR YOU TO DO YOUR WORK, TAKE CARE OF THINGS AT HOME, OR GET ALONG WITH OTHER PEOPLE: NOT DIFFICULT AT ALL
2. NOT BEING ABLE TO STOP OR CONTROL WORRYING: 0
3. WORRYING TOO MUCH ABOUT DIFFERENT THINGS: NOT AT ALL
7. FEELING AFRAID AS IF SOMETHING AWFUL MIGHT HAPPEN: NOT AT ALL
6. BECOMING EASILY ANNOYED OR IRRITABLE: NOT AT ALL
3. WORRYING TOO MUCH ABOUT DIFFERENT THINGS: 0
5. BEING SO RESTLESS THAT IT IS HARD TO SIT STILL: 0
2. NOT BEING ABLE TO STOP OR CONTROL WORRYING: NOT AT ALL
7. FEELING AFRAID AS IF SOMETHING AWFUL MIGHT HAPPEN: 0
6. BECOMING EASILY ANNOYED OR IRRITABLE: 0
4. TROUBLE RELAXING: 0
1. FEELING NERVOUS, ANXIOUS, OR ON EDGE: NOT AT ALL
4. TROUBLE RELAXING: NOT AT ALL
5. BEING SO RESTLESS THAT IT IS HARD TO SIT STILL: NOT AT ALL
IF YOU CHECKED OFF ANY PROBLEMS ON THIS QUESTIONNAIRE, HOW DIFFICULT HAVE THESE PROBLEMS MADE IT FOR YOU TO DO YOUR WORK, TAKE CARE OF THINGS AT HOME, OR GET ALONG WITH OTHER PEOPLE: NOT DIFFICULT AT ALL
1. FEELING NERVOUS, ANXIOUS, OR ON EDGE: 0
GAD7 TOTAL SCORE: 0

## 2023-08-01 ASSESSMENT — PATIENT HEALTH QUESTIONNAIRE - PHQ9
1. LITTLE INTEREST OR PLEASURE IN DOING THINGS: 0
SUM OF ALL RESPONSES TO PHQ9 QUESTIONS 1 & 2: 0
SUM OF ALL RESPONSES TO PHQ QUESTIONS 1-9: 0
2. FEELING DOWN, DEPRESSED OR HOPELESS: 0
SUM OF ALL RESPONSES TO PHQ QUESTIONS 1-9: 0

## 2023-08-01 ASSESSMENT — LIFESTYLE VARIABLES
HOW MANY STANDARD DRINKS CONTAINING ALCOHOL DO YOU HAVE ON A TYPICAL DAY: 0
HOW OFTEN DO YOU HAVE A DRINK CONTAINING ALCOHOL: NEVER
HOW MANY STANDARD DRINKS CONTAINING ALCOHOL DO YOU HAVE ON A TYPICAL DAY: PATIENT DOES NOT DRINK
HOW OFTEN DO YOU HAVE A DRINK CONTAINING ALCOHOL: 1
HOW OFTEN DO YOU HAVE SIX OR MORE DRINKS ON ONE OCCASION: 1

## 2023-08-01 NOTE — PROGRESS NOTES
Bobby Edmonds : 1950 Sex: male  Age: 68 y.o. Chief Complaint   Patient presents with    Annual Exam         The patient has not been seen since November of last year. He did hire assistance to help him care for his wife, so he is now able to get out and about during the week. He still has stressful weekends caring for her full-time. He has gained back some of the weight that he lost when he was very stressed last summer. We discussed the importance of a healthy weight. He no longer follows with cardiology, he denies any chest pain or palpitations. He denies any problems with the rate control or anticoagulation. Blood work does need to be obtained today. His blood pressure is under good control. Hypertension    Hyperlipidemia      Review of Systems   Cardiovascular:         Atrial fibrillation, hyperlipidemia, hypertension   Gastrointestinal:         GERD   Endocrine:        Pre-diabetes   Musculoskeletal: Negative. Psychiatric/Behavioral: Negative. PMH:  Health Maintenance:  Bone Density Test Screening - (2018)  Bone Density Scan - (2017)  Colonoscopy Screening - (10/16/2017)  Colonoscopy - (2016)  Colonoscopy - (10/16/2017)  Colonoscopy - (2013)  Influenza Vaccination - ()  Psa Test - (2020)  Prostate Exam - (2020)  Physical Exam - (2021)  Rectal Exam - (2020)  Influenza Vaccination - (10/3/2018)  EGD - [facility] SEE REPORT  Zostavax - (2014) SLIP GIVEN  Prevnar Vaccine - (2016) SLIP GIVEN  Pneumonia Vaccination - (2017)  2D ECHO - (2018)  Shingrix Vaccine (Shingles) - SLIP GIVEN  COVID VACCINE 3/2021  MEDICAL PROBLEMS:  Hypertension. Hyperlipidemia. Glucose intolerance. Metabolic syndrome. Gastroesophageal reflux disease.   EROSIVE GASTRITIS - EGD- 10/10 [facility] OMEPRAZOLE STARTED  LEFT SHOULDER PAIN- REFERRED TO MICHELE- SUSPECT RCT  CARPAL TUNNEL- WRIST SPLINT GIVEN   RIGHT INGUINAL HERNIA- REFERRED TO LAQUITA

## 2023-08-02 RX ORDER — ATORVASTATIN CALCIUM 40 MG/1
40 TABLET, FILM COATED ORAL DAILY
Qty: 30 TABLET | Refills: 5 | Status: SHIPPED | OUTPATIENT
Start: 2023-08-02

## 2023-08-26 DIAGNOSIS — I48.0 PAROXYSMAL ATRIAL FIBRILLATION (HCC): ICD-10-CM

## 2023-08-26 DIAGNOSIS — E74.39 GLUCOSE INTOLERANCE: ICD-10-CM

## 2023-08-26 DIAGNOSIS — E66.09 CLASS 1 OBESITY DUE TO EXCESS CALORIES WITH SERIOUS COMORBIDITY AND BODY MASS INDEX (BMI) OF 30.0 TO 30.9 IN ADULT: ICD-10-CM

## 2023-08-26 DIAGNOSIS — R73.03 PREDIABETES: ICD-10-CM

## 2023-08-26 DIAGNOSIS — I10 ESSENTIAL HYPERTENSION: ICD-10-CM

## 2023-08-26 DIAGNOSIS — Z79.01 CHRONIC ANTICOAGULATION: ICD-10-CM

## 2023-08-28 RX ORDER — HYDROCHLOROTHIAZIDE 25 MG/1
TABLET ORAL
Qty: 90 TABLET | Refills: 1 | Status: SHIPPED | OUTPATIENT
Start: 2023-08-28

## 2023-08-28 RX ORDER — OMEPRAZOLE 20 MG/1
CAPSULE, DELAYED RELEASE ORAL
Qty: 90 CAPSULE | Refills: 1 | Status: SHIPPED | OUTPATIENT
Start: 2023-08-28

## 2023-08-28 NOTE — TELEPHONE ENCOUNTER
Medication(s) pended?    [x] Yes  [] No    Last Appointment:  7/19/2022    Future appts:  Future Appointments   Date Time Provider [address]   9/11/2023 11:15 AM Seda Barksdale DPM Col Podiatry [facility]   12/5/2023 10:30 AM Abdullahi Pandya MD [address] [place] [address]

## 2023-09-11 ENCOUNTER — PROCEDURE VISIT (OUTPATIENT)
Dept: PODIATRY | Age: 73
End: 2023-09-11
Payer: MEDICARE

## 2023-09-11 VITALS
TEMPERATURE: 97.6 F | WEIGHT: 214 LBS | BODY MASS INDEX: 28.23 KG/M2 | SYSTOLIC BLOOD PRESSURE: 133 MMHG | DIASTOLIC BLOOD PRESSURE: 79 MMHG

## 2023-09-11 DIAGNOSIS — G60.8 HEREDITARY SENSORY NEUROPATHY: Primary | ICD-10-CM

## 2023-09-11 DIAGNOSIS — M79.675 PAIN IN LEFT TOE(S): ICD-10-CM

## 2023-09-11 DIAGNOSIS — M79.674 PAIN IN RIGHT TOE(S): ICD-10-CM

## 2023-09-11 DIAGNOSIS — M79.674 PAIN IN TOE OF RIGHT FOOT: ICD-10-CM

## 2023-09-11 DIAGNOSIS — I73.9 PERIPHERAL VASCULAR DISEASE, UNSPECIFIED (HCC): ICD-10-CM

## 2023-09-11 DIAGNOSIS — G62.9 NEUROPATHY: ICD-10-CM

## 2023-09-11 DIAGNOSIS — R73.03 PREDIABETES: ICD-10-CM

## 2023-09-11 DIAGNOSIS — B35.1 TINEA UNGUIUM: ICD-10-CM

## 2023-09-11 DIAGNOSIS — Z79.01 ENCOUNTER FOR CURRENT LONG-TERM USE OF ANTICOAGULANTS: ICD-10-CM

## 2023-09-11 PROCEDURE — 11721 DEBRIDE NAIL 6 OR MORE: CPT | Performed by: PODIATRIST

## 2023-09-11 PROCEDURE — 99213 OFFICE O/P EST LOW 20 MIN: CPT | Performed by: PODIATRIST

## 2023-09-11 PROCEDURE — 3017F COLORECTAL CA SCREEN DOC REV: CPT | Performed by: PODIATRIST

## 2023-09-11 PROCEDURE — G8417 CALC BMI ABV UP PARAM F/U: HCPCS | Performed by: PODIATRIST

## 2023-09-11 PROCEDURE — 1123F ACP DISCUSS/DSCN MKR DOCD: CPT | Performed by: PODIATRIST

## 2023-09-11 PROCEDURE — G8427 DOCREV CUR MEDS BY ELIG CLIN: HCPCS | Performed by: PODIATRIST

## 2023-09-11 PROCEDURE — 3078F DIAST BP <80 MM HG: CPT | Performed by: PODIATRIST

## 2023-09-11 PROCEDURE — 3075F SYST BP GE 130 - 139MM HG: CPT | Performed by: PODIATRIST

## 2023-09-11 PROCEDURE — 1036F TOBACCO NON-USER: CPT | Performed by: PODIATRIST

## 2023-09-11 RX ORDER — L-METHYLFOLATE-ALGAE-VIT B12-B6 CAP 3-90.314-2-35 MG 3-90.314-2-35 MG
1 CAP ORAL DAILY
Qty: 60 CAPSULE | Refills: 0 | Status: SHIPPED | OUTPATIENT
Start: 2023-09-11

## 2023-09-11 RX ORDER — BACLOFEN 10 MG/1
10 TABLET ORAL 2 TIMES DAILY
Qty: 120 TABLET | Refills: 0 | Status: SHIPPED | OUTPATIENT
Start: 2023-09-11 | End: 2023-11-10

## 2023-09-11 RX ORDER — PREGABALIN 150 MG/1
150 CAPSULE ORAL
Qty: 180 CAPSULE | Refills: 0 | Status: SHIPPED | OUTPATIENT
Start: 2023-09-11 | End: 2023-11-10

## 2023-09-11 ASSESSMENT — ENCOUNTER SYMPTOMS: GASTROINTESTINAL NEGATIVE: 1

## 2023-09-11 NOTE — PROGRESS NOTES
[address] [place] [address]  Dept: [phone]  Dept Fax: [phone]    Diabetic  PATIENT PROGRESS NOTE  Date of patient's visit: 9/11/2023  Patient's Name:  Nelson Giron YOB: 1950            Patient Care Team:  Car Gay MD as PCP - General (Internal Medicine)  Car Gay MD as PCP - Empaneled Provider  Petra Hope DPM as Consulting Physician (Podiatry)        Chief Complaint   Patient presents with    Toe Pain     Car Gay MD  8/1/2023    Foot Pain     Lyrica ck and mentanx ck          HPI:   Nelson Giron is a 68 y.o. male  Patient relates pain is Present. Currently denies F/C/N/V. Pt's primary care physician is Car Gay MD last seen and is seen today for a  issue patient is having numbness in both feet this is been progressively lyrica     No Known Allergies    No past medical history on file. Prior to Admission medications    Medication Sig Start Date End Date Taking? Authorizing Provider   pregabalin (LYRICA) 150 MG capsule Take 1 capsule by mouth three times daily for 60 days.  Max Daily Amount: 450 mg 9/11/23 11/10/23 Yes Petra Hope DPM   O-gyuodquzpqsh-D0-B12 [facility]) 4-45.887-8-11 MG CAPS capsule Take 1 capsule by mouth daily 9/11/23  Yes Petra Hope DPM   baclofen (LIORESAL) 10 MG tablet Take 1 tablet by mouth 2 times daily 9/11/23 11/10/23 Yes Petra Hope DPM   hydroCHLOROthiazide (HYDRODIURIL) 25 MG tablet TAKE 1 TABLET DAILY 8/28/23  Yes Car Gay MD   omeprazole (PRILOSEC) 20 MG delayed release capsule TAKE 1 CAPSULE DAILY 8/28/23  Yes Car Gay MD   atorvastatin (LIPITOR) 40 MG tablet Take 1 tablet by mouth daily 8/2/23  Yes CECELIA Mays - CNP   amLODIPine-benazepril (LOTREL) 10-20 MG per capsule TAKE 1 CAPSULE DAILY 7/11/23  Yes Car Gay MD   metoprolol succinate (TOPROL XL) 50 MG extended release tablet Take 1 tablet by mouth

## 2023-09-12 RX ORDER — ATORVASTATIN CALCIUM 10 MG/1
10 TABLET, FILM COATED ORAL DAILY
Qty: 90 TABLET | Refills: 3 | OUTPATIENT
Start: 2023-09-12

## 2023-10-02 DIAGNOSIS — I48.0 PAROXYSMAL ATRIAL FIBRILLATION (HCC): ICD-10-CM

## 2023-10-03 NOTE — TELEPHONE ENCOUNTER
Last Appointment:  11/30/2022  Future Appointments   Date Time Provider [address]   12/5/2023 10:30 AM Kai Reddy MD Wen [facility]   12/11/2023 11:30 AM Liam Rucker DPM Col Podiatry [facility]

## 2023-11-10 NOTE — PROGRESS NOTES
Patient in today for nail care. Patient does not have any complaints of pain at this time. Patient's PCP is Lexy Maria MD date of last ov 03/15/2022        Angella Villalobos, DPKASSY         Inna Alvares : 1950 Sex: male  Age: 71 y.o. Patient was referred by Lexy Maria MD    CC:   Painful elongated toenails 1-5 right and left    HPI  This pleasant 79-year-old male patient improving today foot ankle exam.  History long-term anticoagulant therapy with Eliquis. No history of diabetes but states he is a prediabetic. Has seen Dr. Abel Coffey in the past but did need to make a sooner appointment and so he called to make an appointment with myself. Here today for painful elongated toenails 1-5 right and left. No additional pedal complaints at this time. ROS:  Const: Denies constitutional symptoms  Musculo: Denies symptoms other than stated above  Skin: Denies symptoms other than stated above      Current Outpatient Medications:     metoprolol succinate (TOPROL XL) 50 MG extended release tablet, Take 1 tablet by mouth daily, Disp: 90 tablet, Rfl: 3    ELIQUIS 5 MG TABS tablet, TAKE 1 TABLET TWICE A DAY, Disp: 180 tablet, Rfl: 3    L-methylfolate-B6-B12 (METANX) 1-13.381-7-54 MG CAPS capsule, Take 1 capsule by mouth daily, Disp: 60 capsule, Rfl: 0    atorvastatin (LIPITOR) 10 MG tablet, TAKE 1 TABLET DAILY, Disp: 90 tablet, Rfl: 3    omeprazole (PRILOSEC) 20 MG delayed release capsule, TAKE 1 CAPSULE DAILY, Disp: 90 capsule, Rfl: 3    hydroCHLOROthiazide (HYDRODIURIL) 25 MG tablet, TAKE 1 TABLET DAILY, Disp: 90 tablet, Rfl: 3    amLODIPine-benazepril (LOTREL) 10-20 MG per capsule, TAKE 1 CAPSULE DAILY, Disp: 90 capsule, Rfl: 3    ketoconazole (NIZORAL) 2 % shampoo, Apply topically daily as needed. , Disp: 100 mL, Rfl: 5    ketoconazole (NIZORAL) 2 % cream, Apply topically daily. , Disp: 30 g, Rfl: 5    atorvastatin (LIPITOR) 10 MG tablet, TAKE 1 TABLET DAILY, Disp: 90 tablet, Rfl: 1    loratadine (CLARITIN) 10 MG tablet, Take 1 tablet by mouth daily, Disp: 30 tablet, Rfl: 0    ibuprofen (ADVIL) 200 MG tablet, Take 200 mg by mouth every 6 hours as needed for Pain, Disp: , Rfl:     pregabalin (LYRICA) 75 MG capsule, Take 1 capsule by mouth 2 times daily for 60 days. , Disp: 120 capsule, Rfl: 0  No Known Allergies    No past medical history on file. There were no vitals filed for this visit. Work History/Social History: Foot and ankle history:     Focused Lower Extremity Physical Exam:    Neurovascular examination:    Dorsalis Pedis palpable bilateral.  Posterior tibialis non-palpable bilateral.    Capillary Refill Time:  Immediate return  Hair growth:  Symmetrical and bilateral   Skin:  Not atrophic  Edema: Mild edema bilateral feet. Mild edema bilateral ankles. Neurologic:  Light touch diminished bilateral.  Warm to coolness bilateral distal toes  Decreased epicritic sensation    Musculoskeletal/ Orthopedic examination:    Equinis: present bilateral  Dorsiflexion, plantarflexion, inversion, eversion bilateral 5 out of 5 muscle strength  Wiggling toes  Negative Homans  No pain foot or ankle. Dermatology examination:    Toenails 1 through 5 bilateral thickened, elongated, dystrophic, mycotic with subungual debris. Web spaces 1 through 4 bilateral clean dry and intact. No significant hyperkeratotic tissue noted today. No erythema. No open wounds. Assessment and Plan:  Jose Alejandro Clarke was seen today for nail problem. Diagnoses and all orders for this visit:    Tinea unguium    Encounter for current long-term use of anticoagulants    Prediabetes    Hereditary sensory neuropathy          Jose Alejandro Clarke seen new referral today. Continues Eliquis. Formal debridement toenails 1 through 5 right and left with manual debridement for thickness and overall length. Avoid barefoot. Continue wide well supported walking shoes.     Has seen Dr. Pineda Gomez in the past.  He will continue to follow with him going forward in 3 months. Appreciate Dr. Raven Mullen input. Return in about 3 months (around 6/29/2022). Seen By:  Lara Munoz DPM      Document was created using voice recognition software. Note was reviewed however may contain grammatical errors. Name band;

## 2023-11-16 ENCOUNTER — TELEPHONE (OUTPATIENT)
Dept: FAMILY MEDICINE CLINIC | Age: 73
End: 2023-11-16

## 2023-11-16 NOTE — TELEPHONE ENCOUNTER
----- Message from Monica Hancock sent at 11/16/2023  9:29 AM EST -----  Subject: Message to Provider    QUESTIONS  Information for Provider? Patient is calling in asking what the   appointment on 10/5/23 @ [phone] is for ? please contact patient  ---------------------------------------------------------------------------  --------------  [address]  [phone]; OK to leave message on voicemail  ---------------------------------------------------------------------------  --------------  SCRIPT ANSWERS  Relationship to Patient?  Self

## 2023-12-05 ENCOUNTER — OFFICE VISIT (OUTPATIENT)
Dept: FAMILY MEDICINE CLINIC | Age: 73
End: 2023-12-05
Payer: MEDICARE

## 2023-12-05 VITALS
SYSTOLIC BLOOD PRESSURE: 120 MMHG | HEART RATE: 68 BPM | DIASTOLIC BLOOD PRESSURE: 60 MMHG | TEMPERATURE: 98.3 F | OXYGEN SATURATION: 97 % | WEIGHT: 201 LBS | BODY MASS INDEX: 26.52 KG/M2

## 2023-12-05 DIAGNOSIS — F32.9 REACTIVE DEPRESSION: ICD-10-CM

## 2023-12-05 DIAGNOSIS — I48.0 PAROXYSMAL ATRIAL FIBRILLATION (HCC): ICD-10-CM

## 2023-12-05 DIAGNOSIS — Z00.00 ENCOUNTER FOR SUBSEQUENT ANNUAL WELLNESS VISIT IN MEDICARE PATIENT: ICD-10-CM

## 2023-12-05 DIAGNOSIS — Z00.00 MEDICARE ANNUAL WELLNESS VISIT, SUBSEQUENT: Primary | ICD-10-CM

## 2023-12-05 DIAGNOSIS — R73.03 PREDIABETES: ICD-10-CM

## 2023-12-05 DIAGNOSIS — E78.2 MIXED HYPERLIPIDEMIA: ICD-10-CM

## 2023-12-05 DIAGNOSIS — I10 ESSENTIAL HYPERTENSION: Primary | ICD-10-CM

## 2023-12-05 DIAGNOSIS — Z79.01 CHRONIC ANTICOAGULATION: ICD-10-CM

## 2023-12-05 DIAGNOSIS — I10 ESSENTIAL HYPERTENSION: ICD-10-CM

## 2023-12-05 LAB
ABSOLUTE IMMATURE GRANULOCYTE: <0.03 K/UL (ref 0–0.58)
BASOPHILS ABSOLUTE: 0.06 K/UL (ref 0–0.2)
BASOPHILS RELATIVE PERCENT: 1 % (ref 0–2)
CREATININE URINE: 211.5 MG/DL (ref 40–278)
EOSINOPHILS ABSOLUTE: 0.02 K/UL (ref 0.05–0.5)
EOSINOPHILS RELATIVE PERCENT: 0 % (ref 0–6)
HCT VFR BLD CALC: 50.6 % (ref 37–54)
HEMOGLOBIN: 16 G/DL (ref 12.5–16.5)
IMMATURE GRANULOCYTES: 0 % (ref 0–5)
LYMPHOCYTES ABSOLUTE: 2.66 K/UL (ref 1.5–4)
LYMPHOCYTES RELATIVE PERCENT: 32 % (ref 20–42)
MCH RBC QN AUTO: 29.1 PG (ref 26–35)
MCHC RBC AUTO-ENTMCNC: 31.6 G/DL (ref 32–34.5)
MCV RBC AUTO: 92 FL (ref 80–99.9)
MICROALBUMIN/CREAT 24H UR: 44 MG/L (ref 0–19)
MICROALBUMIN/CREAT UR-RTO: 21 MCG/MG CREAT (ref 0–30)
MONOCYTES ABSOLUTE: 0.58 K/UL (ref 0.1–0.95)
MONOCYTES RELATIVE PERCENT: 7 % (ref 2–12)
NEUTROPHILS ABSOLUTE: 4.98 K/UL (ref 1.8–7.3)
NEUTROPHILS RELATIVE PERCENT: 60 % (ref 43–80)
PDW BLD-RTO: 14.5 % (ref 11.5–15)
PLATELET # BLD: 201 K/UL (ref 130–450)
PMV BLD AUTO: 12.5 FL (ref 7–12)
RBC # BLD: 5.5 M/UL (ref 3.8–5.8)
WBC # BLD: 8.3 K/UL (ref 4.5–11.5)

## 2023-12-05 PROCEDURE — 3078F DIAST BP <80 MM HG: CPT | Performed by: INTERNAL MEDICINE

## 2023-12-05 PROCEDURE — 1036F TOBACCO NON-USER: CPT | Performed by: INTERNAL MEDICINE

## 2023-12-05 PROCEDURE — G8427 DOCREV CUR MEDS BY ELIG CLIN: HCPCS | Performed by: INTERNAL MEDICINE

## 2023-12-05 PROCEDURE — G0008 ADMIN INFLUENZA VIRUS VAC: HCPCS | Performed by: INTERNAL MEDICINE

## 2023-12-05 PROCEDURE — 3017F COLORECTAL CA SCREEN DOC REV: CPT | Performed by: INTERNAL MEDICINE

## 2023-12-05 PROCEDURE — G0439 PPPS, SUBSEQ VISIT: HCPCS | Performed by: INTERNAL MEDICINE

## 2023-12-05 PROCEDURE — 99214 OFFICE O/P EST MOD 30 MIN: CPT | Performed by: INTERNAL MEDICINE

## 2023-12-05 PROCEDURE — G8417 CALC BMI ABV UP PARAM F/U: HCPCS | Performed by: INTERNAL MEDICINE

## 2023-12-05 PROCEDURE — 90694 VACC AIIV4 NO PRSRV 0.5ML IM: CPT | Performed by: INTERNAL MEDICINE

## 2023-12-05 PROCEDURE — G8484 FLU IMMUNIZE NO ADMIN: HCPCS | Performed by: INTERNAL MEDICINE

## 2023-12-05 PROCEDURE — 3074F SYST BP LT 130 MM HG: CPT | Performed by: INTERNAL MEDICINE

## 2023-12-05 PROCEDURE — 1123F ACP DISCUSS/DSCN MKR DOCD: CPT | Performed by: INTERNAL MEDICINE

## 2023-12-05 RX ORDER — METOPROLOL SUCCINATE 50 MG/1
50 TABLET, EXTENDED RELEASE ORAL DAILY
Qty: 90 TABLET | Refills: 3 | Status: SHIPPED | OUTPATIENT
Start: 2023-12-05

## 2023-12-05 RX ORDER — FLUOXETINE HYDROCHLORIDE 20 MG/1
20 CAPSULE ORAL DAILY
Qty: 90 CAPSULE | Refills: 1 | Status: SHIPPED | OUTPATIENT
Start: 2023-12-05

## 2023-12-05 ASSESSMENT — LIFESTYLE VARIABLES
HOW OFTEN DO YOU HAVE A DRINK CONTAINING ALCOHOL: NEVER
HOW MANY STANDARD DRINKS CONTAINING ALCOHOL DO YOU HAVE ON A TYPICAL DAY: PATIENT DOES NOT DRINK

## 2023-12-05 ASSESSMENT — PATIENT HEALTH QUESTIONNAIRE - PHQ9
SUM OF ALL RESPONSES TO PHQ9 QUESTIONS 1 & 2: 0
1. LITTLE INTEREST OR PLEASURE IN DOING THINGS: 0
SUM OF ALL RESPONSES TO PHQ QUESTIONS 1-9: 0
SUM OF ALL RESPONSES TO PHQ QUESTIONS 1-9: 0
2. FEELING DOWN, DEPRESSED OR HOPELESS: 0
SUM OF ALL RESPONSES TO PHQ QUESTIONS 1-9: 0
SUM OF ALL RESPONSES TO PHQ QUESTIONS 1-9: 0

## 2023-12-05 NOTE — PROGRESS NOTES
Medicare Annual Wellness Visit    Keaton Grace is here for Medicare AWV    Assessment & Plan     Recommendations for Preventive Services Due: see orders and patient instructions/AVS.  Recommended screening schedule for the next 5-10 years is provided to the patient in written form: see Patient Instructions/AVS.     No follow-ups on file. Subjective       Patient's complete Health Risk Assessment and screening values have been reviewed and are found in Flowsheets. The following problems were reviewed today and where indicated follow up appointments were made and/or referrals ordered. Positive Risk Factor Screenings with Interventions:                 Weight and Activity:  Physical Activity: Inactive (12/5/2023)    Exercise Vital Sign     Days of Exercise per Week: 0 days     Minutes of Exercise per Session: 0 min     On average, how many days per week do you engage in moderate to strenuous exercise (like a brisk walk)?: 0 days  Have you lost any weight without trying in the past 3 months?: (!) Yes  There is no height or weight on file to calculate BMI. Inactivity Interventions:  Patient declined any further interventions or treatment    Unintentional Weight Loss Interventions:  Patient advised to follow up in the office for further evaluation and treatment      Dentist Screen:  Have you seen the dentist within the past year?: (!) No    Intervention:  Advised to schedule with their dentist        Advanced Directives:  Do you have a Living Will?: (!) No    Intervention:  has NO advanced directive - information provided                     Objective   There were no vitals filed for this visit. There is no height or weight on file to calculate BMI. No Known Allergies  Prior to Visit Medications    Medication Sig Taking?  Authorizing Provider   apixaban (ELIQUIS) 5 MG TABS tablet TAKE 1 TABLET TWICE A DAY  Patricia Lopez MD   pregabalin (LYRICA) 150 MG capsule Take 1 capsule by mouth three

## 2023-12-05 NOTE — PROGRESS NOTES
19  Bobby Edmonds : 1950 Sex: male  Age: 68 y.o. No chief complaint on file. Patient is is struggling with caregiving duties taking care of his wife. He also has a blind dog that he has to give insulin injections to twice a day. His weight fluctuates. In the summertime it was up quite a bit and now it is gone down again. He states that he is eating adequately. He does not have a ton of social support. He is to keep in touch with people that he worked with when he worked at [employer] but this really has not been the case recently. Over the last year especially when his wife had a colostomy his duties were quite intense. Patient is denying any homicidal or suicidal ideation but I believe he would could be helped by counseling or medication. He would like to trial medication and we will go ahead and put that order in. He is denying any cardiac or respiratory symptoms including any palpitations, chest pain or chest pressure. He denies any respiratory difficulty. He has had no bleeding or bruising. Hypertension    Hyperlipidemia        Review of Systems  PMH:  Health Maintenance:  Bone Density Test Screening - (2018)  Bone Density Scan - (2017)  Colonoscopy Screening - (10/16/2017) hyperplastic polyps. Colonoscopy - (2016)  Colonoscopy - (10/16/2017)  Colonoscopy - (2013)  Influenza Vaccination - ()  Psa Test -  Prostate Exam - (2020)  Physical Exam - (2021)  Rectal Exam - (2020)  Influenza Vaccination - (10/3/2018)  EGD - [facility] SEE REPORT  Zostavax - (2014) SLIP GIVEN  Prevnar Vaccine - (2016) SLIP GIVEN  Pneumonia Vaccination - (2017)  2D ECHO - (2018)  Shingrix Vaccine (Shingles) - SLIP GIVEN  COVID VACCINE 3/2021  MEDICAL PROBLEMS:  Hypertension. Hyperlipidemia. Glucose intolerance. Metabolic syndrome. Gastroesophageal reflux disease.   EROSIVE GASTRITIS - EGD- 10/10 [facility] OMEPRAZOLE STARTED  LEFT SHOULDER PAIN- REFERRED

## 2023-12-06 LAB
ALBUMIN SERPL-MCNC: 4.5 G/DL (ref 3.5–5.2)
ALP BLD-CCNC: 110 U/L (ref 40–129)
ALT SERPL-CCNC: 15 U/L (ref 0–40)
ANION GAP SERPL CALCULATED.3IONS-SCNC: 20 MMOL/L (ref 7–16)
AST SERPL-CCNC: 18 U/L (ref 0–39)
BILIRUB SERPL-MCNC: 0.7 MG/DL (ref 0–1.2)
BUN BLDV-MCNC: 25 MG/DL (ref 6–23)
CALCIUM SERPL-MCNC: 10.1 MG/DL (ref 8.6–10.2)
CHLORIDE BLD-SCNC: 99 MMOL/L (ref 98–107)
CHOLESTEROL: 155 MG/DL
CO2: 22 MMOL/L (ref 22–29)
CREAT SERPL-MCNC: 1.2 MG/DL (ref 0.7–1.2)
GFR SERPL CREATININE-BSD FRML MDRD: >60 ML/MIN/1.73M2
GLUCOSE BLD-MCNC: 95 MG/DL (ref 74–99)
HBA1C MFR BLD: 6.1 % (ref 4–5.6)
HDLC SERPL-MCNC: 49 MG/DL
LDL CHOLESTEROL: 83 MG/DL
POTASSIUM SERPL-SCNC: 4.4 MMOL/L (ref 3.5–5)
SODIUM BLD-SCNC: 141 MMOL/L (ref 132–146)
TOTAL PROTEIN: 8.2 G/DL (ref 6.4–8.3)
TRIGL SERPL-MCNC: 117 MG/DL
VLDLC SERPL CALC-MCNC: 23 MG/DL

## 2023-12-11 ENCOUNTER — PROCEDURE VISIT (OUTPATIENT)
Dept: PODIATRY | Age: 73
End: 2023-12-11
Payer: MEDICARE

## 2023-12-11 VITALS
BODY MASS INDEX: 26.52 KG/M2 | SYSTOLIC BLOOD PRESSURE: 123 MMHG | TEMPERATURE: 97.7 F | DIASTOLIC BLOOD PRESSURE: 78 MMHG | WEIGHT: 201 LBS

## 2023-12-11 DIAGNOSIS — M79.675 PAIN IN LEFT TOE(S): ICD-10-CM

## 2023-12-11 DIAGNOSIS — G62.9 NEUROPATHY: Primary | ICD-10-CM

## 2023-12-11 DIAGNOSIS — I73.9 PERIPHERAL VASCULAR DISEASE, UNSPECIFIED (HCC): ICD-10-CM

## 2023-12-11 DIAGNOSIS — M79.674 PAIN IN TOE OF RIGHT FOOT: ICD-10-CM

## 2023-12-11 DIAGNOSIS — G60.8 HEREDITARY SENSORY NEUROPATHY: ICD-10-CM

## 2023-12-11 DIAGNOSIS — Z79.01 ENCOUNTER FOR CURRENT LONG-TERM USE OF ANTICOAGULANTS: ICD-10-CM

## 2023-12-11 DIAGNOSIS — M79.674 PAIN IN RIGHT TOE(S): ICD-10-CM

## 2023-12-11 DIAGNOSIS — R73.03 PREDIABETES: ICD-10-CM

## 2023-12-11 DIAGNOSIS — B35.1 TINEA UNGUIUM: ICD-10-CM

## 2023-12-11 PROCEDURE — G8484 FLU IMMUNIZE NO ADMIN: HCPCS | Performed by: PODIATRIST

## 2023-12-11 PROCEDURE — G8427 DOCREV CUR MEDS BY ELIG CLIN: HCPCS | Performed by: PODIATRIST

## 2023-12-11 PROCEDURE — 99213 OFFICE O/P EST LOW 20 MIN: CPT | Performed by: PODIATRIST

## 2023-12-11 PROCEDURE — 3017F COLORECTAL CA SCREEN DOC REV: CPT | Performed by: PODIATRIST

## 2023-12-11 PROCEDURE — 1036F TOBACCO NON-USER: CPT | Performed by: PODIATRIST

## 2023-12-11 PROCEDURE — 3078F DIAST BP <80 MM HG: CPT | Performed by: PODIATRIST

## 2023-12-11 PROCEDURE — G8417 CALC BMI ABV UP PARAM F/U: HCPCS | Performed by: PODIATRIST

## 2023-12-11 PROCEDURE — 11721 DEBRIDE NAIL 6 OR MORE: CPT | Performed by: PODIATRIST

## 2023-12-11 PROCEDURE — 3074F SYST BP LT 130 MM HG: CPT | Performed by: PODIATRIST

## 2023-12-11 PROCEDURE — 1123F ACP DISCUSS/DSCN MKR DOCD: CPT | Performed by: PODIATRIST

## 2023-12-11 RX ORDER — L-METHYLFOLATE-ALGAE-VIT B12-B6 CAP 3-90.314-2-35 MG 3-90.314-2-35 MG
1 CAP ORAL DAILY
Qty: 60 CAPSULE | Refills: 0 | Status: SHIPPED | OUTPATIENT
Start: 2023-12-11

## 2023-12-11 RX ORDER — PREGABALIN 200 MG/1
200 CAPSULE ORAL 2 TIMES DAILY
Qty: 180 CAPSULE | Refills: 0 | Status: SHIPPED | OUTPATIENT
Start: 2023-12-11 | End: 2024-03-10

## 2023-12-11 ASSESSMENT — ENCOUNTER SYMPTOMS: GASTROINTESTINAL NEGATIVE: 1

## 2023-12-11 NOTE — PROGRESS NOTES
[address] [place] [address]  [address] [facility] [address]  Dept: [phone]  Dept Fax: [phone]    Diabetic  PATIENT PROGRESS NOTE  Date of patient's visit: 12/11/2023  Patient's Name:  Edita Valdez YOB: 1950            Patient Care Team:  Stormy Hauser MD as PCP - General (Internal Medicine)  Stormy Hauser MD as PCP - Empaneled Provider  Geoff Lee DPM as Consulting Physician (Podiatry)        Chief Complaint   Patient presents with    Toe Pain     Stormy Hauser MD  12/5/2023    Foot Pain     Lyrica ck and muscle relaxer refill          HPI:   Edita Valdez is a 68 y.o. male  Patient relates pain is Present. Currently denies F/C/N/V. Pt's primary care physician is Stormy Hauser MD last seen and is seen today for a  issue patient is having numbness in both feet this is been progressively lyrica     No Known Allergies    No past medical history on file. Prior to Admission medications    Medication Sig Start Date End Date Taking? Authorizing Provider   pregabalin (LYRICA) 200 MG capsule Take 1 capsule by mouth 2 times daily for 90 days.  Max Daily Amount: 400 mg 12/11/23 3/10/24 Yes Geoff Lee DPM   L-methylfolate-B6-B12 [facility]) 5-00.718-3-54 MG CAPS capsule Take 1 capsule by mouth daily 12/11/23  Yes Geoff Lee DPM   metoprolol succinate (TOPROL XL) 50 MG extended release tablet Take 1 tablet by mouth daily 12/5/23  Yes Stormy Hauser MD   FLUoxetine (PROZAC) 20 MG capsule Take 1 capsule by mouth daily 12/5/23  Yes Stormy Hauser MD   apixaban (ELIQUIS) 5 MG TABS tablet TAKE 1 TABLET TWICE A DAY 10/3/23  Yes Stormy Hauser MD   hydroCHLOROthiazide (HYDRODIURIL) 25 MG tablet TAKE 1 TABLET DAILY 8/28/23  Yes Stormy Hauser MD   omeprazole (PRILOSEC) 20 MG delayed release capsule TAKE 1 CAPSULE DAILY 8/28/23  Yes Stormy Hauser MD   atorvastatin (LIPITOR) 40 MG tablet Take 1 tablet by mouth daily 8/2/23

## 2024-01-18 ENCOUNTER — TELEPHONE (OUTPATIENT)
Dept: PODIATRY | Age: 74
End: 2024-01-18

## 2024-01-18 RX ORDER — KETOCONAZOLE 20 MG/G
CREAM TOPICAL
Qty: 30 G | Refills: 5 | Status: SHIPPED | OUTPATIENT
Start: 2024-01-18

## 2024-01-18 RX ORDER — KETOCONAZOLE 20 MG/ML
SHAMPOO TOPICAL
Qty: 100 ML | Refills: 5 | Status: SHIPPED | OUTPATIENT
Start: 2024-01-18

## 2024-02-03 DIAGNOSIS — R73.03 PREDIABETES: ICD-10-CM

## 2024-02-03 DIAGNOSIS — I48.0 PAROXYSMAL ATRIAL FIBRILLATION (HCC): ICD-10-CM

## 2024-02-03 DIAGNOSIS — Z79.01 CHRONIC ANTICOAGULATION: ICD-10-CM

## 2024-02-03 DIAGNOSIS — I10 ESSENTIAL HYPERTENSION: ICD-10-CM

## 2024-02-03 DIAGNOSIS — E74.39 GLUCOSE INTOLERANCE: ICD-10-CM

## 2024-02-03 DIAGNOSIS — E66.09 CLASS 1 OBESITY DUE TO EXCESS CALORIES WITH SERIOUS COMORBIDITY AND BODY MASS INDEX (BMI) OF 30.0 TO 30.9 IN ADULT: ICD-10-CM

## 2024-02-05 RX ORDER — OMEPRAZOLE 20 MG/1
CAPSULE, DELAYED RELEASE ORAL
Qty: 90 CAPSULE | Refills: 3 | OUTPATIENT
Start: 2024-02-05

## 2024-02-05 RX ORDER — HYDROCHLOROTHIAZIDE 25 MG/1
TABLET ORAL
Qty: 90 TABLET | Refills: 3 | OUTPATIENT
Start: 2024-02-05

## 2024-02-07 ENCOUNTER — OFFICE VISIT (OUTPATIENT)
Dept: FAMILY MEDICINE CLINIC | Age: 74
End: 2024-02-07
Payer: MEDICARE

## 2024-02-07 VITALS
WEIGHT: 201 LBS | SYSTOLIC BLOOD PRESSURE: 120 MMHG | HEART RATE: 64 BPM | BODY MASS INDEX: 26.52 KG/M2 | DIASTOLIC BLOOD PRESSURE: 60 MMHG | OXYGEN SATURATION: 96 % | TEMPERATURE: 98.9 F

## 2024-02-07 DIAGNOSIS — F32.9 REACTIVE DEPRESSION: ICD-10-CM

## 2024-02-07 DIAGNOSIS — Z79.01 CHRONIC ANTICOAGULATION: ICD-10-CM

## 2024-02-07 DIAGNOSIS — I38 VALVULAR HEART DISEASE: ICD-10-CM

## 2024-02-07 DIAGNOSIS — I10 ESSENTIAL HYPERTENSION: Primary | ICD-10-CM

## 2024-02-07 DIAGNOSIS — E78.2 MIXED HYPERLIPIDEMIA: ICD-10-CM

## 2024-02-07 PROCEDURE — 3017F COLORECTAL CA SCREEN DOC REV: CPT | Performed by: INTERNAL MEDICINE

## 2024-02-07 PROCEDURE — 1123F ACP DISCUSS/DSCN MKR DOCD: CPT | Performed by: INTERNAL MEDICINE

## 2024-02-07 PROCEDURE — G8417 CALC BMI ABV UP PARAM F/U: HCPCS | Performed by: INTERNAL MEDICINE

## 2024-02-07 PROCEDURE — G8484 FLU IMMUNIZE NO ADMIN: HCPCS | Performed by: INTERNAL MEDICINE

## 2024-02-07 PROCEDURE — 99213 OFFICE O/P EST LOW 20 MIN: CPT | Performed by: INTERNAL MEDICINE

## 2024-02-07 PROCEDURE — G8427 DOCREV CUR MEDS BY ELIG CLIN: HCPCS | Performed by: INTERNAL MEDICINE

## 2024-02-07 PROCEDURE — 1036F TOBACCO NON-USER: CPT | Performed by: INTERNAL MEDICINE

## 2024-02-07 PROCEDURE — 3078F DIAST BP <80 MM HG: CPT | Performed by: INTERNAL MEDICINE

## 2024-02-07 PROCEDURE — 3074F SYST BP LT 130 MM HG: CPT | Performed by: INTERNAL MEDICINE

## 2024-02-07 RX ORDER — ATORVASTATIN CALCIUM 40 MG/1
40 TABLET, FILM COATED ORAL DAILY
Qty: 90 TABLET | Refills: 3 | Status: SHIPPED | OUTPATIENT
Start: 2024-02-07

## 2024-02-07 NOTE — PROGRESS NOTES
19  Chandana Sandoval : 1950 Sex: male  Age: 73 y.o.    Chief Complaint   Patient presents with    Medication Check     Re ck meds, fluoxetine            Patient continues to have a fair amount of stress secondary to caregiving duties.  It sounds like he is just really not eating well either.  The patient denies cardiac or respiratory symptoms including any palpitations, chest pain or other anginal equivalents.  His weight is stable today.  He states the fluoxetine was helpful for relief of some of his stressors.    Hypertension    Hyperlipidemia        Review of Systems  PMH:  Health Maintenance:  Bone Density Test Screening - (2018)  Bone Density Scan - (2017)  Colonoscopy Screening - (10/16/2017) hyperplastic polyps.  Colonoscopy - (2016)  Colonoscopy - (10/16/2017)  Colonoscopy - (2013)  Influenza Vaccination - ()  Psa Test -  Prostate Exam - (2020)  Physical Exam - (2021)  Rectal Exam - (2020)  Influenza Vaccination - (10/3/2018)  EGD - [facility] SEE REPORT  Zostavax - (2014) SLIP GIVEN  Prevnar Vaccine - (2016) SLIP GIVEN  Pneumonia Vaccination - (2017)  2D ECHO - (2018)  Shingrix Vaccine (Shingles) - SLIP GIVEN  COVID VACCINE 3/2021  MEDICAL PROBLEMS:  Hypertension.  Hyperlipidemia.  Glucose intolerance.  Metabolic syndrome.  Gastroesophageal reflux disease.  EROSIVE GASTRITIS - EGD- 10/10 [facility] OMEPRAZOLE STARTED  LEFT SHOULDER PAIN- REFERRED TO MICHELE- SUSPECT RCT  CARPAL TUNNEL- WRIST SPLINT GIVEN   RIGHT INGUINAL HERNIA- REFERRED TO [facility]   COLONIC POLYPS   DENTAL CARIES/EROSION-REMINDED EACH VISIT TO OBTAIN DENTAL EVALUATION  PROXIMAL MUSCLE PAIN 17-[address] UNDER[address]AY-PREDNISONE STARTED-PMR   AORTIC STENOSIS/PALPITATIONS/DIASTOLIC DYSFUNCTION- REFER TO CARDIOLOGY 3/21/18  Tachycardia- 2019 holter ordered-ATRIAL FIB with rapid ventricular response-  Weight loss- secondary to wife's illness 2022  Reactive

## 2024-02-07 NOTE — TELEPHONE ENCOUNTER
Patient called to request a refill on Rosuvastatin. Patient did not realize that he had an appt scheduled w/ Dr Lagos today. He will keep 2pm appt and will request a refill while he is in the office.

## 2024-03-11 ENCOUNTER — PROCEDURE VISIT (OUTPATIENT)
Dept: PODIATRY | Age: 74
End: 2024-03-11
Payer: MEDICARE

## 2024-03-11 VITALS
BODY MASS INDEX: 26.52 KG/M2 | SYSTOLIC BLOOD PRESSURE: 134 MMHG | DIASTOLIC BLOOD PRESSURE: 72 MMHG | TEMPERATURE: 98.2 F | WEIGHT: 201 LBS

## 2024-03-11 DIAGNOSIS — R73.03 PREDIABETES: ICD-10-CM

## 2024-03-11 DIAGNOSIS — G62.9 NEUROPATHY: ICD-10-CM

## 2024-03-11 DIAGNOSIS — I73.9 PERIPHERAL VASCULAR DISEASE, UNSPECIFIED (HCC): ICD-10-CM

## 2024-03-11 DIAGNOSIS — M79.674 PAIN IN TOE OF RIGHT FOOT: ICD-10-CM

## 2024-03-11 DIAGNOSIS — B35.1 TINEA UNGUIUM: Primary | ICD-10-CM

## 2024-03-11 DIAGNOSIS — G60.8 HEREDITARY SENSORY NEUROPATHY: ICD-10-CM

## 2024-03-11 DIAGNOSIS — M79.675 PAIN IN LEFT TOE(S): ICD-10-CM

## 2024-03-11 DIAGNOSIS — M79.674 PAIN IN RIGHT TOE(S): ICD-10-CM

## 2024-03-11 DIAGNOSIS — Z79.01 ENCOUNTER FOR CURRENT LONG-TERM USE OF ANTICOAGULANTS: ICD-10-CM

## 2024-03-11 PROCEDURE — G8484 FLU IMMUNIZE NO ADMIN: HCPCS | Performed by: PODIATRIST

## 2024-03-11 PROCEDURE — G8417 CALC BMI ABV UP PARAM F/U: HCPCS | Performed by: PODIATRIST

## 2024-03-11 PROCEDURE — 3078F DIAST BP <80 MM HG: CPT | Performed by: PODIATRIST

## 2024-03-11 PROCEDURE — 1036F TOBACCO NON-USER: CPT | Performed by: PODIATRIST

## 2024-03-11 PROCEDURE — 99213 OFFICE O/P EST LOW 20 MIN: CPT | Performed by: PODIATRIST

## 2024-03-11 PROCEDURE — 1123F ACP DISCUSS/DSCN MKR DOCD: CPT | Performed by: PODIATRIST

## 2024-03-11 PROCEDURE — G8427 DOCREV CUR MEDS BY ELIG CLIN: HCPCS | Performed by: PODIATRIST

## 2024-03-11 PROCEDURE — 3075F SYST BP GE 130 - 139MM HG: CPT | Performed by: PODIATRIST

## 2024-03-11 PROCEDURE — 3017F COLORECTAL CA SCREEN DOC REV: CPT | Performed by: PODIATRIST

## 2024-03-11 RX ORDER — BACLOFEN 10 MG/1
10 TABLET ORAL 2 TIMES DAILY
Qty: 120 TABLET | Refills: 0 | Status: SHIPPED | OUTPATIENT
Start: 2024-03-11 | End: 2024-05-10

## 2024-03-11 RX ORDER — PREGABALIN 200 MG/1
200 CAPSULE ORAL 3 TIMES DAILY
Qty: 270 CAPSULE | Refills: 0 | Status: SHIPPED | OUTPATIENT
Start: 2024-03-11 | End: 2024-06-09

## 2024-03-11 RX ORDER — L-METHYLFOLATE-ALGAE-VIT B12-B6 CAP 3-90.314-2-35 MG 3-90.314-2-35 MG
1 CAP ORAL DAILY
Qty: 60 CAPSULE | Refills: 0 | Status: SHIPPED | OUTPATIENT
Start: 2024-03-11

## 2024-03-11 ASSESSMENT — ENCOUNTER SYMPTOMS: GASTROINTESTINAL NEGATIVE: 1

## 2024-03-11 NOTE — PROGRESS NOTES
chronic pain   oarrs report reviewed I have reviewed current medication  and prior noted I believe the need for refill is warranted  I have reviewed oarrs report and found no drug seeking behavior   I have discussed all side effects and narcotic policy   with pt  pt understands and agrees with treatment  a follow up  appointment is made .  Current condition and treatment options discussed in detail.  Discussed conservative and surgical options with the patient. Advised pt to .  Verbal and written instructions given to patient. Contact office with any questions/problems/concerns.  RTC in 3month(s).    Debridement of all painful nails was performed with both manual and electrical debridement to prevent infection and/or ulceration.  Patient tolerated the debridement well, without any complaints.  1-5 bilateral   Patient was asymptomatic after debridement I RENEWED ALL MED'S  ADDED BACLOFEN       Pt seen for  chronic pain   oarrs report reviewed I have reviewed current medication  and prior noted I believe the need for refill is warranted  I have reviewed oarrs report and found no drug seeking behavior   I have discussed all side effects and narcotic policy   with pt  pt understands and agrees with treatment  a follow up  appointment is made  Metanx rx    Electronically signed by Conner Varela DPM on 3/11/2024 at 11:36 AM  3/11/2024  New prescription for shampoo was given patient is to continue drying feet daily cotton shoes daily patient needs to use Tinactin powder

## 2024-05-24 ENCOUNTER — HOSPITAL ENCOUNTER (OUTPATIENT)
Dept: CARDIOLOGY | Age: 74
End: 2024-05-24
Attending: INTERNAL MEDICINE
Payer: MEDICARE

## 2024-05-24 VITALS
DIASTOLIC BLOOD PRESSURE: 70 MMHG | SYSTOLIC BLOOD PRESSURE: 130 MMHG | BODY MASS INDEX: 26.64 KG/M2 | WEIGHT: 201 LBS | HEIGHT: 73 IN

## 2024-05-24 DIAGNOSIS — I38 VALVULAR HEART DISEASE: ICD-10-CM

## 2024-05-24 LAB
ECHO AO ASC DIAM: 3.1 CM
ECHO AO ASCENDING AORTA INDEX: 1.44 CM/M2
ECHO AR MAX VEL PISA: 4 M/S
ECHO AV AREA PEAK VELOCITY: 0.8 CM2
ECHO AV AREA VTI: 0.8 CM2
ECHO AV AREA/BSA PEAK VELOCITY: 0.4 CM2/M2
ECHO AV AREA/BSA VTI: 0.4 CM2/M2
ECHO AV CUSP MM: 1.7 CM
ECHO AV MEAN GRADIENT: 31 MMHG
ECHO AV MEAN VELOCITY: 2.6 M/S
ECHO AV PEAK GRADIENT: 57 MMHG
ECHO AV PEAK VELOCITY: 3.8 M/S
ECHO AV REGURGITANT PHT: 851.2 MILLISECOND
ECHO AV VELOCITY RATIO: 0.24
ECHO AV VTI: 99.4 CM
ECHO BSA: 2.17 M2
ECHO EST RA PRESSURE: 3 MMHG
ECHO LA DIAMETER INDEX: 1.67 CM/M2
ECHO LA DIAMETER: 3.6 CM
ECHO LA VOL A-L A2C: 65 ML (ref 18–58)
ECHO LA VOL A-L A4C: 59 ML (ref 18–58)
ECHO LA VOL MOD A2C: 59 ML (ref 18–58)
ECHO LA VOL MOD A4C: 55 ML (ref 18–58)
ECHO LA VOLUME AREA LENGTH: 67 ML
ECHO LA VOLUME INDEX A-L A2C: 30 ML/M2 (ref 16–34)
ECHO LA VOLUME INDEX A-L A4C: 27 ML/M2 (ref 16–34)
ECHO LA VOLUME INDEX AREA LENGTH: 31 ML/M2 (ref 16–34)
ECHO LA VOLUME INDEX MOD A2C: 27 ML/M2 (ref 16–34)
ECHO LA VOLUME INDEX MOD A4C: 25 ML/M2 (ref 16–34)
ECHO LV FRACTIONAL SHORTENING: 29 % (ref 28–44)
ECHO LV INTERNAL DIMENSION DIASTOLE INDEX: 2.22 CM/M2
ECHO LV INTERNAL DIMENSION DIASTOLIC: 4.8 CM (ref 4.2–5.9)
ECHO LV INTERNAL DIMENSION SYSTOLIC INDEX: 1.57 CM/M2
ECHO LV INTERNAL DIMENSION SYSTOLIC: 3.4 CM
ECHO LV ISOVOLUMETRIC RELAXATION TIME (IVRT): 58.8 MS
ECHO LV IVSD: 0.8 CM (ref 0.6–1)
ECHO LV IVSS: 1.1 CM
ECHO LV MASS 2D: 126.7 G (ref 88–224)
ECHO LV MASS INDEX 2D: 58.7 G/M2 (ref 49–115)
ECHO LV POSTERIOR WALL DIASTOLIC: 0.8 CM (ref 0.6–1)
ECHO LV POSTERIOR WALL SYSTOLIC: 1.2 CM
ECHO LV RELATIVE WALL THICKNESS RATIO: 0.33
ECHO LVOT AREA: 3.5 CM2
ECHO LVOT AV VTI INDEX: 0.22
ECHO LVOT DIAM: 2.1 CM
ECHO LVOT MEAN GRADIENT: 2 MMHG
ECHO LVOT PEAK GRADIENT: 3 MMHG
ECHO LVOT PEAK VELOCITY: 0.9 M/S
ECHO LVOT STROKE VOLUME INDEX: 34.3 ML/M2
ECHO LVOT SV: 74.1 ML
ECHO LVOT VTI: 21.4 CM
ECHO MV AREA PHT: 2.6 CM2
ECHO MV AREA VTI: 1.8 CM2
ECHO MV E DECELERATION TIME (DT): 299.7 MS
ECHO MV LVOT VTI INDEX: 1.89
ECHO MV MAX VELOCITY: 2 M/S
ECHO MV MEAN GRADIENT: 6 MMHG
ECHO MV MEAN VELOCITY: 1.1 M/S
ECHO MV PEAK GRADIENT: 17 MMHG
ECHO MV PRESSURE HALF TIME (PHT): 84.4 MS
ECHO MV VTI: 40.4 CM
ECHO PV MAX VELOCITY: 0.8 M/S
ECHO PV MEAN GRADIENT: 1 MMHG
ECHO PV MEAN VELOCITY: 0.5 M/S
ECHO PV PEAK GRADIENT: 2 MMHG
ECHO PV VTI: 15.3 CM
ECHO RIGHT VENTRICULAR SYSTOLIC PRESSURE (RVSP): 38 MMHG
ECHO TV REGURGITANT MAX VELOCITY: 2.95 M/S
ECHO TV REGURGITANT PEAK GRADIENT: 35 MMHG

## 2024-05-24 PROCEDURE — 93306 TTE W/DOPPLER COMPLETE: CPT

## 2024-05-28 LAB
ECHO AO ASC DIAM: 3.1 CM
ECHO AO ASCENDING AORTA INDEX: 1.44 CM/M2
ECHO AR MAX VEL PISA: 4 M/S
ECHO AV AREA PEAK VELOCITY: 0.8 CM2
ECHO AV AREA VTI: 0.8 CM2
ECHO AV AREA/BSA PEAK VELOCITY: 0.4 CM2/M2
ECHO AV AREA/BSA VTI: 0.4 CM2/M2
ECHO AV CUSP MM: 1.7 CM
ECHO AV MEAN GRADIENT: 31 MMHG
ECHO AV MEAN VELOCITY: 2.6 M/S
ECHO AV PEAK GRADIENT: 57 MMHG
ECHO AV PEAK VELOCITY: 3.8 M/S
ECHO AV REGURGITANT PHT: 851.2 MILLISECOND
ECHO AV VELOCITY RATIO: 0.24
ECHO AV VTI: 99.4 CM
ECHO BSA: 2.17 M2
ECHO EST RA PRESSURE: 3 MMHG
ECHO LA DIAMETER INDEX: 1.67 CM/M2
ECHO LA DIAMETER: 3.6 CM
ECHO LA VOL A-L A2C: 65 ML (ref 18–58)
ECHO LA VOL A-L A4C: 59 ML (ref 18–58)
ECHO LA VOL MOD A2C: 59 ML (ref 18–58)
ECHO LA VOL MOD A4C: 55 ML (ref 18–58)
ECHO LA VOLUME AREA LENGTH: 67 ML
ECHO LA VOLUME INDEX A-L A2C: 30 ML/M2 (ref 16–34)
ECHO LA VOLUME INDEX A-L A4C: 27 ML/M2 (ref 16–34)
ECHO LA VOLUME INDEX AREA LENGTH: 31 ML/M2 (ref 16–34)
ECHO LA VOLUME INDEX MOD A2C: 27 ML/M2 (ref 16–34)
ECHO LA VOLUME INDEX MOD A4C: 25 ML/M2 (ref 16–34)
ECHO LV FRACTIONAL SHORTENING: 29 % (ref 28–44)
ECHO LV INTERNAL DIMENSION DIASTOLE INDEX: 2.22 CM/M2
ECHO LV INTERNAL DIMENSION DIASTOLIC: 4.8 CM (ref 4.2–5.9)
ECHO LV INTERNAL DIMENSION SYSTOLIC INDEX: 1.57 CM/M2
ECHO LV INTERNAL DIMENSION SYSTOLIC: 3.4 CM
ECHO LV ISOVOLUMETRIC RELAXATION TIME (IVRT): 58.8 MS
ECHO LV IVSD: 0.8 CM (ref 0.6–1)
ECHO LV IVSS: 1.1 CM
ECHO LV MASS 2D: 126.7 G (ref 88–224)
ECHO LV MASS INDEX 2D: 58.7 G/M2 (ref 49–115)
ECHO LV POSTERIOR WALL DIASTOLIC: 0.8 CM (ref 0.6–1)
ECHO LV POSTERIOR WALL SYSTOLIC: 1.2 CM
ECHO LV RELATIVE WALL THICKNESS RATIO: 0.33
ECHO LVOT AREA: 3.5 CM2
ECHO LVOT AV VTI INDEX: 0.22
ECHO LVOT DIAM: 2.1 CM
ECHO LVOT MEAN GRADIENT: 2 MMHG
ECHO LVOT PEAK GRADIENT: 3 MMHG
ECHO LVOT PEAK VELOCITY: 0.9 M/S
ECHO LVOT STROKE VOLUME INDEX: 34.3 ML/M2
ECHO LVOT SV: 74.1 ML
ECHO LVOT VTI: 21.4 CM
ECHO MV AREA PHT: 2.6 CM2
ECHO MV AREA VTI: 1.8 CM2
ECHO MV E DECELERATION TIME (DT): 299.7 MS
ECHO MV LVOT VTI INDEX: 1.89
ECHO MV MAX VELOCITY: 2 M/S
ECHO MV MEAN GRADIENT: 6 MMHG
ECHO MV MEAN VELOCITY: 1.1 M/S
ECHO MV PEAK GRADIENT: 17 MMHG
ECHO MV PRESSURE HALF TIME (PHT): 84.4 MS
ECHO MV VTI: 40.4 CM
ECHO PV MAX VELOCITY: 0.8 M/S
ECHO PV MEAN GRADIENT: 1 MMHG
ECHO PV MEAN VELOCITY: 0.5 M/S
ECHO PV PEAK GRADIENT: 2 MMHG
ECHO PV VTI: 15.3 CM
ECHO RIGHT VENTRICULAR SYSTOLIC PRESSURE (RVSP): 38 MMHG
ECHO RV TAPSE: 1.8 CM (ref 1.7–?)
ECHO TV REGURGITANT MAX VELOCITY: 2.95 M/S
ECHO TV REGURGITANT PEAK GRADIENT: 35 MMHG

## 2024-05-30 DIAGNOSIS — I48.19 ATRIAL FIBRILLATION, PERSISTENT (HCC): ICD-10-CM

## 2024-05-30 DIAGNOSIS — I35.0 NONRHEUMATIC AORTIC VALVE STENOSIS: Primary | ICD-10-CM

## 2024-06-10 PROBLEM — R63.4 WEIGHT LOSS: Status: RESOLVED | Noted: 2022-07-19 | Resolved: 2024-06-10

## 2024-06-10 PROBLEM — E66.811 CLASS 1 OBESITY DUE TO EXCESS CALORIES WITH SERIOUS COMORBIDITY AND BODY MASS INDEX (BMI) OF 30.0 TO 30.9 IN ADULT: Status: RESOLVED | Noted: 2020-06-24 | Resolved: 2024-06-10

## 2024-06-10 PROBLEM — R73.03 PREDIABETES: Status: RESOLVED | Noted: 2020-06-24 | Resolved: 2024-06-10

## 2024-06-10 PROBLEM — Z00.00 ENCOUNTER FOR ANNUAL WELLNESS EXAM IN MEDICARE PATIENT: Status: RESOLVED | Noted: 2019-06-14 | Resolved: 2024-06-10

## 2024-06-10 PROBLEM — G89.29 CHRONIC RIGHT SHOULDER PAIN: Status: RESOLVED | Noted: 2019-08-19 | Resolved: 2024-06-10

## 2024-06-10 PROBLEM — I35.0 AORTIC STENOSIS: Status: ACTIVE | Noted: 2024-06-10

## 2024-06-10 PROBLEM — E74.39 GLUCOSE INTOLERANCE: Status: RESOLVED | Noted: 2019-06-14 | Resolved: 2024-06-10

## 2024-06-10 PROBLEM — M25.511 CHRONIC RIGHT SHOULDER PAIN: Status: RESOLVED | Noted: 2019-08-19 | Resolved: 2024-06-10

## 2024-06-10 PROBLEM — E66.09 CLASS 1 OBESITY DUE TO EXCESS CALORIES WITH SERIOUS COMORBIDITY AND BODY MASS INDEX (BMI) OF 30.0 TO 30.9 IN ADULT: Status: RESOLVED | Noted: 2020-06-24 | Resolved: 2024-06-10

## 2024-06-10 PROBLEM — I38 VALVULAR HEART DISEASE: Status: RESOLVED | Noted: 2019-06-14 | Resolved: 2024-06-10

## 2024-06-10 PROBLEM — Z79.01 CHRONIC ANTICOAGULATION: Status: RESOLVED | Noted: 2020-06-24 | Resolved: 2024-06-10

## 2024-06-11 ENCOUNTER — OFFICE VISIT (OUTPATIENT)
Dept: CARDIOLOGY CLINIC | Age: 74
End: 2024-06-11
Payer: MEDICARE

## 2024-06-11 VITALS
HEIGHT: 73 IN | BODY MASS INDEX: 28.1 KG/M2 | SYSTOLIC BLOOD PRESSURE: 115 MMHG | DIASTOLIC BLOOD PRESSURE: 76 MMHG | RESPIRATION RATE: 18 BRPM | WEIGHT: 212 LBS | HEART RATE: 72 BPM

## 2024-06-11 DIAGNOSIS — I35.0 AORTIC VALVE STENOSIS, ETIOLOGY OF CARDIAC VALVE DISEASE UNSPECIFIED: ICD-10-CM

## 2024-06-11 DIAGNOSIS — I48.0 PAROXYSMAL ATRIAL FIBRILLATION (HCC): Primary | ICD-10-CM

## 2024-06-11 PROCEDURE — 3078F DIAST BP <80 MM HG: CPT | Performed by: INTERNAL MEDICINE

## 2024-06-11 PROCEDURE — 99205 OFFICE O/P NEW HI 60 MIN: CPT | Performed by: INTERNAL MEDICINE

## 2024-06-11 PROCEDURE — 3017F COLORECTAL CA SCREEN DOC REV: CPT | Performed by: INTERNAL MEDICINE

## 2024-06-11 PROCEDURE — 93000 ELECTROCARDIOGRAM COMPLETE: CPT | Performed by: INTERNAL MEDICINE

## 2024-06-11 PROCEDURE — G8417 CALC BMI ABV UP PARAM F/U: HCPCS | Performed by: INTERNAL MEDICINE

## 2024-06-11 PROCEDURE — 1036F TOBACCO NON-USER: CPT | Performed by: INTERNAL MEDICINE

## 2024-06-11 PROCEDURE — 3074F SYST BP LT 130 MM HG: CPT | Performed by: INTERNAL MEDICINE

## 2024-06-11 PROCEDURE — G8427 DOCREV CUR MEDS BY ELIG CLIN: HCPCS | Performed by: INTERNAL MEDICINE

## 2024-06-11 PROCEDURE — 1123F ACP DISCUSS/DSCN MKR DOCD: CPT | Performed by: INTERNAL MEDICINE

## 2024-06-11 NOTE — PATIENT INSTRUCTIONS
CM met with patient at the bedside to discuss discharge planning assessment. Pt lives with family and has transportation at discharge. Pt verified PCP and Pharmacy. CM will continue to follow for discharge needs.         11/05/20 1313   Discharge Assessment   Assessment Type Discharge Planning Assessment   Confirmed/corrected address and phone number on facesheet? Yes   Assessment information obtained from? Patient   Expected Length of Stay (days) 2   Communicated expected length of stay with patient/caregiver yes   Prior to hospitilization cognitive status: Alert/Oriented   Prior to hospitalization functional status: Independent   Current cognitive status: Alert/Oriented   Current Functional Status: Independent   Lives With spouse   Able to Return to Prior Arrangements yes   Patient's perception of discharge disposition home or selfcare   Readmission Within the Last 30 Days no previous admission in last 30 days   Patient currently being followed by outpatient case management? No   Patient currently receives any other outside agency services? No   Equipment Currently Used at Home none   Do you have any problems affording any of your prescribed medications? No   Is the patient taking medications as prescribed? yes   Does the patient have transportation home? Yes   Transportation Anticipated family or friend will provide   Does the patient receive services at the Coumadin Clinic? No   Discharge Plan A Home with family   Discharge Plan B Home with family   DME Needed Upon Discharge  none   Patient/Family in Agreement with Plan yes      You have severe narrowing of the aortic valve - aortic stenosis  Treatment when you experience sob, chest pain or passing out would be the TAVR procedure

## 2024-06-11 NOTE — PROGRESS NOTES
Chief Complaint   Patient presents with    Valvular Heart Disease    Atrial Fibrillation       Patient Active Problem List    Diagnosis Date Noted    Aortic stenosis 06/10/2024     Overview Note:     A. TTE 5/24/2024(RC): PALMIRA 0.8, MG = 31mm, AV velocity 3.8 m/sec < DI  0.21, EF 60-65%      Reactive depression 02/07/2024    Lumbar radiculopathy 09/14/2021    Paroxysmal atrial fibrillation (HCC) 08/07/2019    Essential hypertension 06/14/2019    Mixed hyperlipidemia 06/14/2019    Tachycardia 06/14/2019       Current Outpatient Medications   Medication Sig Dispense Refill    atorvastatin (LIPITOR) 40 MG tablet Take 1 tablet by mouth daily 90 tablet 3    ketoconazole (NIZORAL) 2 % shampoo Apply topically daily as needed. 100 mL 5    ketoconazole (NIZORAL) 2 % cream Apply topically daily. 30 g 5    metoprolol succinate (TOPROL XL) 50 MG extended release tablet Take 1 tablet by mouth daily 90 tablet 3    FLUoxetine (PROZAC) 20 MG capsule Take 1 capsule by mouth daily 90 capsule 1    apixaban (ELIQUIS) 5 MG TABS tablet TAKE 1 TABLET TWICE A  tablet 3    hydroCHLOROthiazide (HYDRODIURIL) 25 MG tablet TAKE 1 TABLET DAILY 90 tablet 1    omeprazole (PRILOSEC) 20 MG delayed release capsule TAKE 1 CAPSULE DAILY 90 capsule 1    amLODIPine-benazepril (LOTREL) 10-20 MG per capsule TAKE 1 CAPSULE DAILY 90 capsule 3    loratadine (CLARITIN) 10 MG tablet Take 1 tablet by mouth daily 30 tablet 0    ibuprofen (ADVIL;MOTRIN) 200 MG tablet Take 1 tablet by mouth every 6 hours as needed for Pain      pregabalin (LYRICA) 200 MG capsule Take 1 capsule by mouth in the morning, at noon, and at bedtime for 90 days. Max Daily Amount: 600 mg 270 capsule 0     No current facility-administered medications for this visit.        No Known Allergies    Vitals:    06/11/24 1130   BP: 115/76   Pulse: 72   Resp: 18   Weight: 96.2 kg (212 lb)   Height: 1.854 m (6' 1\")                 SUBJECTIVE: Chandana LARA Lori presents to the office today for

## 2024-06-12 ENCOUNTER — OFFICE VISIT (OUTPATIENT)
Dept: FAMILY MEDICINE CLINIC | Age: 74
End: 2024-06-12
Payer: MEDICARE

## 2024-06-12 VITALS
TEMPERATURE: 99 F | HEART RATE: 71 BPM | WEIGHT: 215 LBS | OXYGEN SATURATION: 97 % | DIASTOLIC BLOOD PRESSURE: 60 MMHG | SYSTOLIC BLOOD PRESSURE: 110 MMHG | BODY MASS INDEX: 28.37 KG/M2

## 2024-06-12 DIAGNOSIS — I38 VALVULAR HEART DISEASE: ICD-10-CM

## 2024-06-12 DIAGNOSIS — I48.0 PAROXYSMAL ATRIAL FIBRILLATION (HCC): ICD-10-CM

## 2024-06-12 DIAGNOSIS — I35.0 NONRHEUMATIC AORTIC VALVE STENOSIS: ICD-10-CM

## 2024-06-12 DIAGNOSIS — Z79.01 CHRONIC ANTICOAGULATION: ICD-10-CM

## 2024-06-12 DIAGNOSIS — R79.9 ABNORMAL FINDING OF BLOOD CHEMISTRY, UNSPECIFIED: ICD-10-CM

## 2024-06-12 DIAGNOSIS — I10 ESSENTIAL HYPERTENSION: Primary | ICD-10-CM

## 2024-06-12 DIAGNOSIS — E78.2 MIXED HYPERLIPIDEMIA: ICD-10-CM

## 2024-06-12 DIAGNOSIS — I10 ESSENTIAL HYPERTENSION: ICD-10-CM

## 2024-06-12 LAB
ALBUMIN: 4.2 G/DL (ref 3.5–5.2)
ALP BLD-CCNC: 109 U/L (ref 40–129)
ALT SERPL-CCNC: 17 U/L (ref 0–40)
ANION GAP SERPL CALCULATED.3IONS-SCNC: 12 MMOL/L (ref 7–16)
AST SERPL-CCNC: 17 U/L (ref 0–39)
BASOPHILS ABSOLUTE: 0.04 K/UL (ref 0–0.2)
BASOPHILS RELATIVE PERCENT: 1 % (ref 0–2)
BILIRUB SERPL-MCNC: 0.4 MG/DL (ref 0–1.2)
BUN BLDV-MCNC: 26 MG/DL (ref 6–23)
CALCIUM SERPL-MCNC: 9.3 MG/DL (ref 8.6–10.2)
CHLORIDE BLD-SCNC: 100 MMOL/L (ref 98–107)
CHOLESTEROL, TOTAL: 162 MG/DL
CO2: 26 MMOL/L (ref 22–29)
CREAT SERPL-MCNC: 1.2 MG/DL (ref 0.7–1.2)
EOSINOPHILS ABSOLUTE: 0.06 K/UL (ref 0.05–0.5)
EOSINOPHILS RELATIVE PERCENT: 1 % (ref 0–6)
GFR, ESTIMATED: 63 ML/MIN/1.73M2
GLUCOSE BLD-MCNC: 77 MG/DL (ref 74–99)
HBA1C MFR BLD: 6.3 % (ref 4–5.6)
HCT VFR BLD CALC: 47 % (ref 37–54)
HDLC SERPL-MCNC: 57 MG/DL
HEMOGLOBIN: 14.9 G/DL (ref 12.5–16.5)
IMMATURE GRANULOCYTES %: 0 % (ref 0–5)
IMMATURE GRANULOCYTES ABSOLUTE: <0.03 K/UL (ref 0–0.58)
LDL CHOLESTEROL: 81 MG/DL
LYMPHOCYTES ABSOLUTE: 3.5 K/UL (ref 1.5–4)
LYMPHOCYTES RELATIVE PERCENT: 39 % (ref 20–42)
MCH RBC QN AUTO: 29 PG (ref 26–35)
MCHC RBC AUTO-ENTMCNC: 31.7 G/DL (ref 32–34.5)
MCV RBC AUTO: 91.4 FL (ref 80–99.9)
MONOCYTES ABSOLUTE: 0.72 K/UL (ref 0.1–0.95)
MONOCYTES RELATIVE PERCENT: 8 % (ref 2–12)
NEUTROPHILS ABSOLUTE: 4.54 K/UL (ref 1.8–7.3)
NEUTROPHILS RELATIVE PERCENT: 51 % (ref 43–80)
PDW BLD-RTO: 14.4 % (ref 11.5–15)
PLATELET # BLD: 190 K/UL (ref 130–450)
PMV BLD AUTO: 12.2 FL (ref 7–12)
POTASSIUM SERPL-SCNC: 4.5 MMOL/L (ref 3.5–5)
PRO-BNP: 992 PG/ML (ref 0–125)
RBC # BLD: 5.14 M/UL (ref 3.8–5.8)
SODIUM BLD-SCNC: 138 MMOL/L (ref 132–146)
TOTAL PROTEIN: 7.4 G/DL (ref 6.4–8.3)
TRIGL SERPL-MCNC: 120 MG/DL
VLDLC SERPL CALC-MCNC: 24 MG/DL
WBC # BLD: 8.9 K/UL (ref 4.5–11.5)

## 2024-06-12 PROCEDURE — G2211 COMPLEX E/M VISIT ADD ON: HCPCS | Performed by: INTERNAL MEDICINE

## 2024-06-12 PROCEDURE — 3078F DIAST BP <80 MM HG: CPT | Performed by: INTERNAL MEDICINE

## 2024-06-12 PROCEDURE — G8427 DOCREV CUR MEDS BY ELIG CLIN: HCPCS | Performed by: INTERNAL MEDICINE

## 2024-06-12 PROCEDURE — 3017F COLORECTAL CA SCREEN DOC REV: CPT | Performed by: INTERNAL MEDICINE

## 2024-06-12 PROCEDURE — 1123F ACP DISCUSS/DSCN MKR DOCD: CPT | Performed by: INTERNAL MEDICINE

## 2024-06-12 PROCEDURE — 3074F SYST BP LT 130 MM HG: CPT | Performed by: INTERNAL MEDICINE

## 2024-06-12 PROCEDURE — 99214 OFFICE O/P EST MOD 30 MIN: CPT | Performed by: INTERNAL MEDICINE

## 2024-06-12 PROCEDURE — G8417 CALC BMI ABV UP PARAM F/U: HCPCS | Performed by: INTERNAL MEDICINE

## 2024-06-12 PROCEDURE — 1036F TOBACCO NON-USER: CPT | Performed by: INTERNAL MEDICINE

## 2024-06-12 ASSESSMENT — PATIENT HEALTH QUESTIONNAIRE - PHQ9
SUM OF ALL RESPONSES TO PHQ QUESTIONS 1-9: 0
SUM OF ALL RESPONSES TO PHQ9 QUESTIONS 1 & 2: 0
SUM OF ALL RESPONSES TO PHQ QUESTIONS 1-9: 0
3. TROUBLE FALLING OR STAYING ASLEEP: NOT AT ALL
1. LITTLE INTEREST OR PLEASURE IN DOING THINGS: NOT AT ALL
SUM OF ALL RESPONSES TO PHQ QUESTIONS 1-9: 0
8. MOVING OR SPEAKING SO SLOWLY THAT OTHER PEOPLE COULD HAVE NOTICED. OR THE OPPOSITE, BEING SO FIGETY OR RESTLESS THAT YOU HAVE BEEN MOVING AROUND A LOT MORE THAN USUAL: NOT AT ALL
4. FEELING TIRED OR HAVING LITTLE ENERGY: NOT AT ALL
10. IF YOU CHECKED OFF ANY PROBLEMS, HOW DIFFICULT HAVE THESE PROBLEMS MADE IT FOR YOU TO DO YOUR WORK, TAKE CARE OF THINGS AT HOME, OR GET ALONG WITH OTHER PEOPLE: NOT DIFFICULT AT ALL
6. FEELING BAD ABOUT YOURSELF - OR THAT YOU ARE A FAILURE OR HAVE LET YOURSELF OR YOUR FAMILY DOWN: NOT AT ALL
2. FEELING DOWN, DEPRESSED OR HOPELESS: NOT AT ALL
5. POOR APPETITE OR OVEREATING: NOT AT ALL
7. TROUBLE CONCENTRATING ON THINGS, SUCH AS READING THE NEWSPAPER OR WATCHING TELEVISION: NOT AT ALL
SUM OF ALL RESPONSES TO PHQ QUESTIONS 1-9: 0
9. THOUGHTS THAT YOU WOULD BE BETTER OFF DEAD, OR OF HURTING YOURSELF: NOT AT ALL

## 2024-06-12 NOTE — PROGRESS NOTES
DAILY, Disp: 90 capsule, Rfl: 3    ketoconazole (NIZORAL) 2 % shampoo, Apply topically daily as needed., Disp: 100 mL, Rfl: 5    ketoconazole (NIZORAL) 2 % cream, Apply topically daily., Disp: 30 g, Rfl: 5    FLUoxetine (PROZAC) 20 MG capsule, Take 1 capsule by mouth daily, Disp: 90 capsule, Rfl: 1    loratadine (CLARITIN) 10 MG tablet, Take 1 tablet by mouth daily, Disp: 30 tablet, Rfl: 0    ibuprofen (ADVIL;MOTRIN) 200 MG tablet, Take 1 tablet by mouth every 6 hours as needed for Pain, Disp: , Rfl:   No Known Allergies    No past medical history on file.  No past surgical history on file.  No family history on file.  Social History     Socioeconomic History    Marital status:      Spouse name: Not on file    Number of children: Not on file    Years of education: Not on file    Highest education level: Not on file   Occupational History    Not on file   Tobacco Use    Smoking status: Never    Smokeless tobacco: Never   Vaping Use    Vaping Use: Never used   Substance and Sexual Activity    Alcohol use: Not Currently    Drug use: Never    Sexual activity: Yes     Partners: Female   Other Topics Concern    Not on file   Social History Narrative    Not on file     Social Determinants of Health     Financial Resource Strain: Low Risk  (8/1/2023)    Overall Financial Resource Strain (CARDIA)     Difficulty of Paying Living Expenses: Not hard at all   Food Insecurity: Not on file (8/1/2023)   Transportation Needs: Unknown (8/1/2023)    PRAPARE - Transportation     Lack of Transportation (Medical): Not on file     Lack of Transportation (Non-Medical): No   Physical Activity: Inactive (12/5/2023)    Exercise Vital Sign     Days of Exercise per Week: 0 days     Minutes of Exercise per Session: 0 min   Stress: Not on file   Social Connections: Not on file   Intimate Partner Violence: Not on file   Housing Stability: Unknown (8/1/2023)    Housing Stability Vital Sign     Unable to Pay for Housing in the Last Year: Not

## 2024-06-18 RX ORDER — AMLODIPINE BESYLATE AND BENAZEPRIL HYDROCHLORIDE 10; 20 MG/1; MG/1
1 CAPSULE ORAL DAILY
Qty: 90 CAPSULE | Refills: 3 | Status: SHIPPED | OUTPATIENT
Start: 2024-06-18

## 2024-06-18 NOTE — TELEPHONE ENCOUNTER
Last Appointment:  6/12/2024    Future appts:  Future Appointments   Date Time Provider Department Center   6/24/2024 11:30 AM Conner Varela DPM Col Podiatry [facility]   12/4/2024  1:00 PM [facility]   12/11/2024 10:15 AM Rod Han MD [facility]

## 2024-06-24 ENCOUNTER — PROCEDURE VISIT (OUTPATIENT)
Dept: PODIATRY | Age: 74
End: 2024-06-24
Payer: MEDICARE

## 2024-06-24 VITALS
SYSTOLIC BLOOD PRESSURE: 128 MMHG | DIASTOLIC BLOOD PRESSURE: 80 MMHG | BODY MASS INDEX: 28.37 KG/M2 | WEIGHT: 215 LBS | TEMPERATURE: 97.8 F

## 2024-06-24 DIAGNOSIS — G62.9 NEUROPATHY: Primary | ICD-10-CM

## 2024-06-24 DIAGNOSIS — M79.674 PAIN IN RIGHT TOE(S): ICD-10-CM

## 2024-06-24 DIAGNOSIS — B35.1 TINEA UNGUIUM: ICD-10-CM

## 2024-06-24 DIAGNOSIS — R73.03 PREDIABETES: ICD-10-CM

## 2024-06-24 DIAGNOSIS — I73.9 PERIPHERAL VASCULAR DISEASE, UNSPECIFIED (HCC): ICD-10-CM

## 2024-06-24 DIAGNOSIS — G60.8 HEREDITARY SENSORY NEUROPATHY: ICD-10-CM

## 2024-06-24 DIAGNOSIS — Z79.01 ENCOUNTER FOR CURRENT LONG-TERM USE OF ANTICOAGULANTS: ICD-10-CM

## 2024-06-24 DIAGNOSIS — M79.675 PAIN IN LEFT TOE(S): ICD-10-CM

## 2024-06-24 DIAGNOSIS — M79.674 PAIN IN TOE OF RIGHT FOOT: ICD-10-CM

## 2024-06-24 PROCEDURE — G8417 CALC BMI ABV UP PARAM F/U: HCPCS | Performed by: PODIATRIST

## 2024-06-24 PROCEDURE — 99213 OFFICE O/P EST LOW 20 MIN: CPT | Performed by: PODIATRIST

## 2024-06-24 PROCEDURE — G8427 DOCREV CUR MEDS BY ELIG CLIN: HCPCS | Performed by: PODIATRIST

## 2024-06-24 PROCEDURE — 3017F COLORECTAL CA SCREEN DOC REV: CPT | Performed by: PODIATRIST

## 2024-06-24 PROCEDURE — 3079F DIAST BP 80-89 MM HG: CPT | Performed by: PODIATRIST

## 2024-06-24 PROCEDURE — 1036F TOBACCO NON-USER: CPT | Performed by: PODIATRIST

## 2024-06-24 PROCEDURE — 3074F SYST BP LT 130 MM HG: CPT | Performed by: PODIATRIST

## 2024-06-24 PROCEDURE — 1123F ACP DISCUSS/DSCN MKR DOCD: CPT | Performed by: PODIATRIST

## 2024-06-24 PROCEDURE — 11721 DEBRIDE NAIL 6 OR MORE: CPT | Performed by: PODIATRIST

## 2024-06-24 RX ORDER — PREGABALIN 200 MG/1
200 CAPSULE ORAL 3 TIMES DAILY
Qty: 270 CAPSULE | Refills: 0 | Status: SHIPPED | OUTPATIENT
Start: 2024-06-24 | End: 2024-09-22

## 2024-06-24 NOTE — PROGRESS NOTES
[facility]  [address] [place] [id number]  Dept: [phone]  Dept Fax: [phone]    Diabetic  PATIENT PROGRESS NOTE  Date of patient's visit: 6/24/2024  Patient's Name:  Chandana Sandoval YOB: 1950            Patient Care Team:  Mario Lagos MD as PCP - General (Internal Medicine)  Mario Lagos MD as PCP - Empaneled Provider  Conner Varela DPM as Consulting Physician (Podiatry)        Chief Complaint   Patient presents with    Foot Pain    Toe Pain     Mario Lagos MD  6/12/24   Pt does not need lyrica shampoo and cream          HPI:   Chandana Sandoval is a 74 y.o. male  Patient relates pain is Present.    Currently denies F/C/N/V. Pt's primary care physician is Mario Lagos MD last seen and is seen today for a  issue patient is having numbness in both feet this is been progressively lyrica     No Known Allergies    No past medical history on file.    Prior to Admission medications    Medication Sig Start Date End Date Taking? Authorizing Provider   pregabalin (LYRICA) 200 MG capsule Take 1 capsule by mouth in the morning, at noon, and at bedtime for 90 days. Max Daily Amount: 600 mg 6/24/24 9/22/24 Yes Conner Varela DPM   amLODIPine-benazepril (LOTREL) 10-20 MG per capsule TAKE 1 CAPSULE BY MOUTH DAILY 6/18/24  Yes Paola Hung APRN - CNP   atorvastatin (LIPITOR) 40 MG tablet Take 1 tablet by mouth daily 2/7/24  Yes Mario Lagos MD   ketoconazole (NIZORAL) 2 % shampoo Apply topically daily as needed. 1/18/24  Yes Conner Varela DPM   ketoconazole (NIZORAL) 2 % cream Apply topically daily. 1/18/24  Yes Conner Varela DPM   metoprolol succinate (TOPROL XL) 50 MG extended release tablet Take 1 tablet by mouth daily 12/5/23  Yes Mario Lagos MD   FLUoxetine (PROZAC) 20 MG capsule Take 1 capsule by mouth daily 12/5/23  Yes Mario Lagos MD   apixaban (ELIQUIS) 5 MG TABS tablet TAKE 1 TABLET TWICE A  Referred by: Kaz Rubin MD; Medical Diagnosis (from order):    Diagnosis Information      Diagnosis    724.4 (ICD-9-CM) - M54.16 (ICD-10-CM) - Lumbar radiculopathy                Initial Evaluation    Visit:  1   Treatment Diagnosis: lumbar, symptoms with increased pain/symptoms, impaired range of motion, impaired strength, impaired activity tolerance, impaired tissue mobility, impaired muscle length/flexibility  Date of onset: 3-4 wks ago  Chart reviewed at time of initial evaluation (relevant co-morbidities, allergies, tests and medications listed):   Past Medical History:  No date: Allergic rhinitis  5/30/2014: Apnea      Comment:  BiPAP, between 7-15 over a year with good results.  5/30/2014: Essential hypertension, benign  No date: HTN (hypertension)      Comment:  Diagnosed age 19  6/30/2014: Impaired fasting glucose  6/7/2016: Obesity, Class III, BMI 40-49.9 (morbid obesity) (CMS/HCC)     Past Surgical History:  2009: Biopsy of external ear; Right      Comment:  Benign, Dr. Elias   Diagnostic Tests: X-ray: reviewed.      ZOIE Mendez complains of left low back pain that started 3-4 weeks ago.  Thinks his pain may be related to a slip and fall on ice.  Endorses numbness and tingling to left lateral lower leg at times.  Managing pain with ibuprofen and has started his short course steroid pack.  Pain is usually worst in the morning.  Has foam rolled with no improvement.  Heat makes pain worse.  Sitting tolerance is limited by pain.  Pain is generally improved if he is more   Pain / Symptoms:  Pain rating (out of 10): Current: 3 ; Best: 0; Worst: 8  Location: Left buttock / low back  Quality / Description: ache, tight, throbbing, stiff, sore, shooting, sharp, numbness, tingling.  Alleviating Factors: prescription medications, over-the-counter medication.   Function:   Limitations /  Exacerbation Factors: pain, difficulty, increased time, bending/squatting/lifting  Prior Level of Function: pain free ADLs and IADLs. no limitation in involved extremity,  Patient Goals: decreased pain, increased motion and increased strength    Prior treatment: no therapies  Discharged from hospital, home health, or skilled nursing facility in last 30 days: no    Home Environment:   Patient lives with significant other. children  Assistance available: as needed  Feels safe at home/work/school.  2 or more falls or an unexplained fall with injury in the last year:  No    OBJECTIVE                                                                                                                     Range of Motion (ROM)   (degrees unless noted; active unless noted; norms in ( ); negative=lacking to 0, positive=beyond 0)   Hip:    - Internal Rotation in supine (45-50):        • Left: 20        • Right: 25    - External Rotation in sitting (45-50):        • Left: 27        • Right: 32  Lumbar:    - Flexion (60-80):  WNL     - Extension (25):  WNL     - Side Bend (25-35):        • Left:  50%  pain         • Right:  WNL     Strength  (out of 5 unless noted, standard test position unless noted, lbs tested with hand held dynamometer)   Hip:    - Flexion:        • Left (L2-3): 4+        • Right (L2-3): 4+     - Abduction:        • Left (L4-5): 4+        • Right (L4-5): 4+     - Internal Rotation:        • Left (L2-3): 4, pain        • Right (L2-3): 4+    - External Rotation:        • Left (L4-5): 4+        • Right (L4-5): 4+   Knee:    - Flexion:        • Left (L5-S1): 5        • Right (L5-S1): 5    - Extension:        • Left (L3-4): 5        • Right (L3-4): 5  Ankle:    - Dorsiflexion:        • Left (L4-5): 5        • Right (L4-5): 5     Deep Tendon Reflexes:   Patellar (L3/4): Left: 1+; Right: 2+   Achilles (L5/S1):  Left: 0;  Right: 0   Dermatome Testing:  L1 (back, over trochanter and groin):     Light Touch: Left: intact  Right: intact  L2 (back, front of thigh to knee):     Light Touch: Left: intact Right: intact  L3 (back, upper buttock, anterior thigh, knee, medial lower leg):     Light Touch: Left: intact Right: intact  L4 (medial buttock, lateral thigh, medial leg, dorsum of foot, great toe):     Light Touch: Left: intact Right: intact  L5 (buttock, posterior and lateral thigh, lateral aspect of leg, dorsum of foot, medial half of sole, 1-3rd toes):     Light Touch: Left: intact Right: intact   S1 (buttock, thigh, posterior leg):     Light Touch: Left: intact Right: intact        Palpation:   Spine - Left: Lumbar Paraspinals: hypertonic and tenderness; L5-S1 tenderness     Special Tests:  Straight Leg Raise:     Passive supine: Left: negative Right: negative  Sacroiliac Joint Thigh Thrust: Left: negative Right: negative  Slump Test: Left: positive Right: negative    Outcome Measures:   OSWESTRY Total Scored: 8  OSWESTRY Total Possible Score: 50  OSWESTRY Score Calculated: 16 %  (0-20% = minimal disability; 20-40% = moderate disability; 40-60% = severe disability; 60-80% = crippled; % = bed bound) see flowsheet for additional documentation   TREATMENT                                                                                                                initial evaluation completed    Therapeutic Exercise:  Patient provided education / instruction:  - Home exercise program (handout provided):    - Instruction / education regarding related anatomy, therapeutic prognosis and POC   - Recommendations for icing involved area at lease 1-2 x / day for 15 mins per session     SB stretch - over tball at wall, L foot back x10  Bridge x10  Seated resisted hip abduction x10, gtb, cues for sitting tall  Open book stretch x5      Skilled input: verbal instruction/cues and tactile instruction/cues    Writer verbally educated and received verbal consent for hand placement, positioning of patient, and techniques to be performed today  from patient for clothing adjustments for techniques, hand placement and palpation for techniques and therapist position for techniques as described above and how they are pertinent to the patient's plan of care.    Home Exercise Program/Education Materials: Access Code: [email or web address]  URL: [email or web address]  Date: 12/21/2021  Prepared by: Calos Velasco    Exercises  · Supine Bridge - 2 x daily - 2 sets - 10 reps  · Seated Hip Abduction with Resistance - 2 x daily - 2 sets - 10 reps  · Sidelying Open Book Thoracic Lumbar Rotation and Extension - 2 x daily - 1 sets - 10 reps - 5 sec hold  · Ice - 2 x daily    SB stretch - over tball at wall, L foot back x10           ASSESSMENT                                                                                                           45 year old male patient has reported functional limitations listed above impacted by signs and symptoms consistent with below   • Involved: lumbar   • Symptoms/impairments: increased pain/symptoms, impaired range of motion, impaired strength, impaired activity tolerance, impaired tissue mobility and impaired muscle length/flexibility    Patient presents with a left lumbar sprain  Prognosis: patient will benefit from skilled therapy  Rehabilitative potential is: very good  Predicted patient presentation: Moderate (evolving) - Patient comorbidities and complexities, as defined above, may have varying impact on steady progress for prescribed plan of care.  Patient Education:   Who will be receiving education: patient  Are they ready to learn: yes  Preferred learning style: written, verbal and demonstration  Barriers to learning: no barriers apparent at this time  Results of above outlined education: Verbalizes understanding and Demonstrates understanding      PLAN                                                                                                                         The following skilled  interventions to be implemented to achieve goals listed below:Dry Needling  Manual Therapy (24001)  Neuromuscular Re-Education (46994)  Therapeutic Activity (96899)  Therapeutic Exercise (62898)  Electrical Stimulation (unattended, 86339 or )  Heat/Cold (94990)  Ultrasound/Phonophoresis (86573)  Frequency / Duration: 2 times per week tapering as patient progresses for an estimated total of 8 visits for 4 weeks    Patient involved in and agreed to plan of care and goals.  Patient has been given attendance policy at time of initial evaluation.  Suggestions for next session as indicated: Progress per plan of care      GOALS                                                                                                                           Decrease pain/symptoms to 1-2/10  Improve involved strength to 4+  Improve involved ROM to 100% lumbar side bend   The above improvements in impairments to assist in obtaining goals listed below  Long Term Goals: to be met by end of plan of care  1. Patient will bend/squat with reported manageable/tolerable pain for house cleaning and care such as sweeping, vacuuming, dusting, laundry related tasks  2. Patient will sit for 60 minutes with manageable/tolerable pain for watching a program/movie with friends/family (interpersonal relationships)   3. Oswestry: Patient will complete form to reflect an improved score to less than or equal to 8% to indicate patient reported improvement in function/disability/impairment (minimal detectable change: 12%).  4. Patient will be independent with progressed and modified home exercise program.      Therapy procedure time and total treatment time can be found documented on the Time Entry flowsheet

## 2024-06-27 ENCOUNTER — OFFICE VISIT (OUTPATIENT)
Dept: CARDIOTHORACIC SURGERY | Age: 74
End: 2024-06-27
Payer: MEDICARE

## 2024-06-27 VITALS
HEART RATE: 57 BPM | RESPIRATION RATE: 20 BRPM | BODY MASS INDEX: 28.1 KG/M2 | SYSTOLIC BLOOD PRESSURE: 117 MMHG | HEIGHT: 73 IN | WEIGHT: 212 LBS | DIASTOLIC BLOOD PRESSURE: 81 MMHG

## 2024-06-27 DIAGNOSIS — I35.0 SEVERE AORTIC STENOSIS: Primary | ICD-10-CM

## 2024-06-27 PROCEDURE — 3017F COLORECTAL CA SCREEN DOC REV: CPT | Performed by: STUDENT IN AN ORGANIZED HEALTH CARE EDUCATION/TRAINING PROGRAM

## 2024-06-27 PROCEDURE — G8427 DOCREV CUR MEDS BY ELIG CLIN: HCPCS | Performed by: STUDENT IN AN ORGANIZED HEALTH CARE EDUCATION/TRAINING PROGRAM

## 2024-06-27 PROCEDURE — 3074F SYST BP LT 130 MM HG: CPT | Performed by: STUDENT IN AN ORGANIZED HEALTH CARE EDUCATION/TRAINING PROGRAM

## 2024-06-27 PROCEDURE — 3079F DIAST BP 80-89 MM HG: CPT | Performed by: STUDENT IN AN ORGANIZED HEALTH CARE EDUCATION/TRAINING PROGRAM

## 2024-06-27 PROCEDURE — 1123F ACP DISCUSS/DSCN MKR DOCD: CPT | Performed by: STUDENT IN AN ORGANIZED HEALTH CARE EDUCATION/TRAINING PROGRAM

## 2024-06-27 PROCEDURE — 1036F TOBACCO NON-USER: CPT | Performed by: STUDENT IN AN ORGANIZED HEALTH CARE EDUCATION/TRAINING PROGRAM

## 2024-06-27 PROCEDURE — G8417 CALC BMI ABV UP PARAM F/U: HCPCS | Performed by: STUDENT IN AN ORGANIZED HEALTH CARE EDUCATION/TRAINING PROGRAM

## 2024-06-27 PROCEDURE — 99203 OFFICE O/P NEW LOW 30 MIN: CPT | Performed by: STUDENT IN AN ORGANIZED HEALTH CARE EDUCATION/TRAINING PROGRAM

## 2024-06-27 NOTE — PROGRESS NOTES
[facility]  Visit Note      Patient name: Chandana Sandoval    Reason for consult: aortic stenosis    Referring Physician: Dr. Lagos    Primary Care Physician: Mario Lagos MD    Date of service: 6/27/2024    Chief Complaint: aortic stenosis    HPI: Mr. Sandoval is a 75 yo male who presents for evaluation of aortic stenosis. PMH significant for permanent AFib, HTN, HLD, depresssion, lumbar radiculopathy    He had an echo done after a murmur was detected at visit with PCP in February 2024. The study suggested severe AS, and he was referred to Dr. Han for further evaluation at which time he denied any cardiac symptoms. He then had follow up with Dr. Lagos who advised valve clinic referral.    TTE 5/28/24:    Left Ventricle: Normal left ventricular systolic function with a visually estimated EF of 60 - 65%. Left ventricle size is normal. Normal wall thickness. Normal wall motion. Indeterminate diastolic function.    Right Ventricle: Normal systolic function. TAPSE is 1.8 cm.    Aortic Valve: Not well visualized. Thickened cusp. Severely calcified cusp. Mild regurgitation. Moderate to severe stenosis of the aortic valve. AV mean gradient is 31 mmHg. AV peak velocity is 3.8 m/s. LVOT:AV VTI Index is 0.22. AV area by continuity VTI is 0.8 cm2. AV Stroke Volume index is 34.3 mL/m2.    Mitral Valve: Mild regurgitation.    Tricuspid Valve: Mild regurgitation. The estimated RVSP is 38 mmHg.    Right Atrium: Right atrium is moderately dilated.    Appears to be in atrial fibrillation.    Allergies: No Known Allergies    Home medications:    Current Outpatient Medications   Medication Sig Dispense Refill    pregabalin (LYRICA) 200 MG capsule Take 1 capsule by mouth in the morning, at noon, and at bedtime for 90 days. Max Daily Amount: 600 mg 270 capsule 0    amLODIPine-benazepril (LOTREL) 10-20 MG per capsule TAKE 1 CAPSULE BY MOUTH DAILY 90 capsule 3    atorvastatin (LIPITOR) 40 MG tablet Take 1 tablet by

## 2024-08-15 DIAGNOSIS — I48.0 PAROXYSMAL ATRIAL FIBRILLATION (HCC): ICD-10-CM

## 2024-08-16 NOTE — TELEPHONE ENCOUNTER
Last Appointment:  6/12/2024  Future Appointments   Date Time Provider Department Center   9/23/2024 11:45 AM Conner Varela DPM Col Podiatry [facility]   12/4/2024  1:00 PM Mario Lagos MD [facility]   12/11/2024 10:15 AM Rod Han MD [facility]

## 2024-09-23 ENCOUNTER — PROCEDURE VISIT (OUTPATIENT)
Dept: PODIATRY | Age: 74
End: 2024-09-23
Payer: MEDICARE

## 2024-09-23 VITALS
TEMPERATURE: 98.2 F | BODY MASS INDEX: 27.97 KG/M2 | DIASTOLIC BLOOD PRESSURE: 82 MMHG | WEIGHT: 212 LBS | SYSTOLIC BLOOD PRESSURE: 140 MMHG

## 2024-09-23 DIAGNOSIS — G62.9 NEUROPATHY: Primary | ICD-10-CM

## 2024-09-23 DIAGNOSIS — M79.675 PAIN IN LEFT TOE(S): ICD-10-CM

## 2024-09-23 DIAGNOSIS — I73.9 PERIPHERAL VASCULAR DISEASE, UNSPECIFIED (HCC): ICD-10-CM

## 2024-09-23 DIAGNOSIS — R73.03 PREDIABETES: ICD-10-CM

## 2024-09-23 DIAGNOSIS — M79.674 PAIN IN TOE OF RIGHT FOOT: ICD-10-CM

## 2024-09-23 DIAGNOSIS — G60.8 HEREDITARY SENSORY NEUROPATHY: ICD-10-CM

## 2024-09-23 DIAGNOSIS — B35.1 TINEA UNGUIUM: ICD-10-CM

## 2024-09-23 DIAGNOSIS — Z79.01 ENCOUNTER FOR CURRENT LONG-TERM USE OF ANTICOAGULANTS: ICD-10-CM

## 2024-09-23 DIAGNOSIS — M79.674 PAIN IN RIGHT TOE(S): ICD-10-CM

## 2024-09-23 PROCEDURE — G8417 CALC BMI ABV UP PARAM F/U: HCPCS | Performed by: PODIATRIST

## 2024-09-23 PROCEDURE — 11721 DEBRIDE NAIL 6 OR MORE: CPT | Performed by: PODIATRIST

## 2024-09-23 PROCEDURE — 1123F ACP DISCUSS/DSCN MKR DOCD: CPT | Performed by: PODIATRIST

## 2024-09-23 PROCEDURE — 99213 OFFICE O/P EST LOW 20 MIN: CPT | Performed by: PODIATRIST

## 2024-09-23 PROCEDURE — 1036F TOBACCO NON-USER: CPT | Performed by: PODIATRIST

## 2024-09-23 PROCEDURE — 3079F DIAST BP 80-89 MM HG: CPT | Performed by: PODIATRIST

## 2024-09-23 PROCEDURE — 3017F COLORECTAL CA SCREEN DOC REV: CPT | Performed by: PODIATRIST

## 2024-09-23 PROCEDURE — 3077F SYST BP >= 140 MM HG: CPT | Performed by: PODIATRIST

## 2024-09-23 PROCEDURE — G8427 DOCREV CUR MEDS BY ELIG CLIN: HCPCS | Performed by: PODIATRIST

## 2024-09-23 RX ORDER — PREGABALIN 200 MG/1
200 CAPSULE ORAL 3 TIMES DAILY
Qty: 270 CAPSULE | Refills: 0 | Status: SHIPPED | OUTPATIENT
Start: 2024-09-23 | End: 2024-12-22

## 2024-11-04 DIAGNOSIS — I48.0 PAROXYSMAL ATRIAL FIBRILLATION (HCC): ICD-10-CM

## 2024-11-04 DIAGNOSIS — E66.811 CLASS 1 OBESITY DUE TO EXCESS CALORIES WITH SERIOUS COMORBIDITY AND BODY MASS INDEX (BMI) OF 30.0 TO 30.9 IN ADULT: ICD-10-CM

## 2024-11-04 DIAGNOSIS — I10 ESSENTIAL HYPERTENSION: ICD-10-CM

## 2024-11-04 DIAGNOSIS — Z79.01 CHRONIC ANTICOAGULATION: ICD-10-CM

## 2024-11-04 DIAGNOSIS — E66.09 CLASS 1 OBESITY DUE TO EXCESS CALORIES WITH SERIOUS COMORBIDITY AND BODY MASS INDEX (BMI) OF 30.0 TO 30.9 IN ADULT: ICD-10-CM

## 2024-11-04 DIAGNOSIS — R73.03 PREDIABETES: ICD-10-CM

## 2024-11-04 DIAGNOSIS — E74.39 GLUCOSE INTOLERANCE: ICD-10-CM

## 2024-11-04 NOTE — TELEPHONE ENCOUNTER
Last Appointment:  6/12/2024  Future Appointments   Date Time Provider Department Center   12/4/2024  1:00 PM Mario Lagos MD [facility]   12/11/2024 10:15 AM Rod Han MD [facility]   12/16/2024 11:45 AM Conner Varela DPM Col Podiatry [facility]

## 2024-12-04 ENCOUNTER — TELEPHONE (OUTPATIENT)
Dept: FAMILY MEDICINE CLINIC | Age: 74
End: 2024-12-04

## 2024-12-04 ENCOUNTER — OFFICE VISIT (OUTPATIENT)
Dept: FAMILY MEDICINE CLINIC | Age: 74
End: 2024-12-04

## 2024-12-04 ENCOUNTER — OFFICE VISIT (OUTPATIENT)
Dept: FAMILY MEDICINE CLINIC | Age: 74
End: 2024-12-04
Payer: MEDICARE

## 2024-12-04 VITALS
DIASTOLIC BLOOD PRESSURE: 72 MMHG | WEIGHT: 215 LBS | BODY MASS INDEX: 28.49 KG/M2 | OXYGEN SATURATION: 98 % | RESPIRATION RATE: 16 BRPM | TEMPERATURE: 97.3 F | SYSTOLIC BLOOD PRESSURE: 112 MMHG | HEART RATE: 53 BPM | HEIGHT: 73 IN

## 2024-12-04 VITALS — WEIGHT: 215 LBS | HEIGHT: 73 IN | BODY MASS INDEX: 28.49 KG/M2 | RESPIRATION RATE: 16 BRPM

## 2024-12-04 DIAGNOSIS — E78.2 MIXED HYPERLIPIDEMIA: ICD-10-CM

## 2024-12-04 DIAGNOSIS — F32.9 REACTIVE DEPRESSION: ICD-10-CM

## 2024-12-04 DIAGNOSIS — R73.03 PREDIABETES: ICD-10-CM

## 2024-12-04 DIAGNOSIS — Z12.5 PROSTATE CANCER SCREENING: ICD-10-CM

## 2024-12-04 DIAGNOSIS — I35.0 AORTIC VALVE STENOSIS, ETIOLOGY OF CARDIAC VALVE DISEASE UNSPECIFIED: ICD-10-CM

## 2024-12-04 DIAGNOSIS — I10 ESSENTIAL HYPERTENSION: ICD-10-CM

## 2024-12-04 DIAGNOSIS — Z00.00 MEDICARE ANNUAL WELLNESS VISIT, SUBSEQUENT: Primary | ICD-10-CM

## 2024-12-04 DIAGNOSIS — Z23 NEED FOR INFLUENZA VACCINATION: ICD-10-CM

## 2024-12-04 DIAGNOSIS — Z00.00 ENCOUNTER FOR SUBSEQUENT ANNUAL WELLNESS VISIT IN MEDICARE PATIENT: ICD-10-CM

## 2024-12-04 DIAGNOSIS — I48.0 PAROXYSMAL ATRIAL FIBRILLATION (HCC): ICD-10-CM

## 2024-12-04 DIAGNOSIS — I48.0 PAROXYSMAL ATRIAL FIBRILLATION (HCC): Primary | ICD-10-CM

## 2024-12-04 LAB
ALBUMIN: 4.1 G/DL (ref 3.5–5.2)
ALP BLD-CCNC: 103 U/L (ref 40–129)
ALT SERPL-CCNC: 14 U/L (ref 0–40)
ANION GAP SERPL CALCULATED.3IONS-SCNC: 12 MMOL/L (ref 7–16)
AST SERPL-CCNC: 17 U/L (ref 0–39)
BASOPHILS ABSOLUTE: 0.03 K/UL (ref 0–0.2)
BASOPHILS RELATIVE PERCENT: 0 % (ref 0–2)
BILIRUB SERPL-MCNC: 0.4 MG/DL (ref 0–1.2)
BUN BLDV-MCNC: 23 MG/DL (ref 6–23)
CALCIUM SERPL-MCNC: 9.5 MG/DL (ref 8.6–10.2)
CHLORIDE BLD-SCNC: 102 MMOL/L (ref 98–107)
CHOLESTEROL, TOTAL: 156 MG/DL
CO2: 27 MMOL/L (ref 22–29)
CREAT SERPL-MCNC: 1.1 MG/DL (ref 0.7–1.2)
EOSINOPHILS ABSOLUTE: 0.05 K/UL (ref 0.05–0.5)
EOSINOPHILS RELATIVE PERCENT: 1 % (ref 0–6)
GFR, ESTIMATED: 68 ML/MIN/1.73M2
GLUCOSE BLD-MCNC: 95 MG/DL (ref 74–99)
HCT VFR BLD CALC: 48.1 % (ref 37–54)
HDLC SERPL-MCNC: 42 MG/DL
HEMOGLOBIN: 15.5 G/DL (ref 12.5–16.5)
IMMATURE GRANULOCYTES %: 0 % (ref 0–5)
IMMATURE GRANULOCYTES ABSOLUTE: <0.03 K/UL (ref 0–0.58)
LDL CHOLESTEROL: 68 MG/DL
LYMPHOCYTES ABSOLUTE: 3.28 K/UL (ref 1.5–4)
LYMPHOCYTES RELATIVE PERCENT: 41 % (ref 20–42)
MCH RBC QN AUTO: 29.5 PG (ref 26–35)
MCHC RBC AUTO-ENTMCNC: 32.2 G/DL (ref 32–34.5)
MCV RBC AUTO: 91.6 FL (ref 80–99.9)
MONOCYTES ABSOLUTE: 0.6 K/UL (ref 0.1–0.95)
MONOCYTES RELATIVE PERCENT: 8 % (ref 2–12)
NEUTROPHILS ABSOLUTE: 4.01 K/UL (ref 1.8–7.3)
NEUTROPHILS RELATIVE PERCENT: 50 % (ref 43–80)
PDW BLD-RTO: 14.2 % (ref 11.5–15)
PLATELET # BLD: 219 K/UL (ref 130–450)
PMV BLD AUTO: 12 FL (ref 7–12)
POTASSIUM SERPL-SCNC: 4.2 MMOL/L (ref 3.5–5)
PROSTATE SPECIFIC ANTIGEN: 0.54 NG/ML (ref 0–4)
RBC # BLD: 5.25 M/UL (ref 3.8–5.8)
SODIUM BLD-SCNC: 141 MMOL/L (ref 132–146)
TOTAL PROTEIN: 7.4 G/DL (ref 6.4–8.3)
TRIGL SERPL-MCNC: 231 MG/DL
VLDLC SERPL CALC-MCNC: 46 MG/DL
WBC # BLD: 8 K/UL (ref 4.5–11.5)

## 2024-12-04 PROCEDURE — 1159F MED LIST DOCD IN RCRD: CPT | Performed by: INTERNAL MEDICINE

## 2024-12-04 PROCEDURE — 3017F COLORECTAL CA SCREEN DOC REV: CPT | Performed by: INTERNAL MEDICINE

## 2024-12-04 PROCEDURE — G8484 FLU IMMUNIZE NO ADMIN: HCPCS | Performed by: INTERNAL MEDICINE

## 2024-12-04 PROCEDURE — G0439 PPPS, SUBSEQ VISIT: HCPCS | Performed by: INTERNAL MEDICINE

## 2024-12-04 PROCEDURE — 1123F ACP DISCUSS/DSCN MKR DOCD: CPT | Performed by: INTERNAL MEDICINE

## 2024-12-04 SDOH — ECONOMIC STABILITY: FOOD INSECURITY: WITHIN THE PAST 12 MONTHS, YOU WORRIED THAT YOUR FOOD WOULD RUN OUT BEFORE YOU GOT MONEY TO BUY MORE.: NEVER TRUE

## 2024-12-04 SDOH — ECONOMIC STABILITY: FOOD INSECURITY: WITHIN THE PAST 12 MONTHS, THE FOOD YOU BOUGHT JUST DIDN'T LAST AND YOU DIDN'T HAVE MONEY TO GET MORE.: NEVER TRUE

## 2024-12-04 SDOH — ECONOMIC STABILITY: INCOME INSECURITY: HOW HARD IS IT FOR YOU TO PAY FOR THE VERY BASICS LIKE FOOD, HOUSING, MEDICAL CARE, AND HEATING?: NOT HARD AT ALL

## 2024-12-04 ASSESSMENT — PATIENT HEALTH QUESTIONNAIRE - PHQ9
8. MOVING OR SPEAKING SO SLOWLY THAT OTHER PEOPLE COULD HAVE NOTICED. OR THE OPPOSITE, BEING SO FIGETY OR RESTLESS THAT YOU HAVE BEEN MOVING AROUND A LOT MORE THAN USUAL: NOT AT ALL
5. POOR APPETITE OR OVEREATING: NOT AT ALL
2. FEELING DOWN, DEPRESSED OR HOPELESS: NOT AT ALL
4. FEELING TIRED OR HAVING LITTLE ENERGY: NOT AT ALL
SUM OF ALL RESPONSES TO PHQ QUESTIONS 1-9: 1
SUM OF ALL RESPONSES TO PHQ QUESTIONS 1-9: 1
10. IF YOU CHECKED OFF ANY PROBLEMS, HOW DIFFICULT HAVE THESE PROBLEMS MADE IT FOR YOU TO DO YOUR WORK, TAKE CARE OF THINGS AT HOME, OR GET ALONG WITH OTHER PEOPLE: NOT DIFFICULT AT ALL
7. TROUBLE CONCENTRATING ON THINGS, SUCH AS READING THE NEWSPAPER OR WATCHING TELEVISION: NOT AT ALL
6. FEELING BAD ABOUT YOURSELF - OR THAT YOU ARE A FAILURE OR HAVE LET YOURSELF OR YOUR FAMILY DOWN: NOT AT ALL
SUM OF ALL RESPONSES TO PHQ QUESTIONS 1-9: 1
9. THOUGHTS THAT YOU WOULD BE BETTER OFF DEAD, OR OF HURTING YOURSELF: NOT AT ALL
SUM OF ALL RESPONSES TO PHQ9 QUESTIONS 1 & 2: 0
SUM OF ALL RESPONSES TO PHQ QUESTIONS 1-9: 1
3. TROUBLE FALLING OR STAYING ASLEEP: SEVERAL DAYS
1. LITTLE INTEREST OR PLEASURE IN DOING THINGS: NOT AT ALL

## 2024-12-04 NOTE — PROGRESS NOTES
19  Chandana MARCO Sandoval : 1950 Sex: male  Age: 74 y.o.    Chief Complaint   Patient presents with    Hypertension           Patient is asymptomatic from his aortic stenosis although this is considered severe.  He denies any syncope or near syncope.  He has no chest pain or chest pressure.  He denies any palpitations.  The patient does have atrial fibrillation.  He is chronically anticoagulated.  There is been no bleeding or bruising.  There is been no embolic phenomenon.  Patient's wife was recently placed on hospice.  He states he is struggling a little bit with her care but seems to be doing fairly well.    Hypertension    Hyperlipidemia        Review of Systems  PMH:  Health Maintenance:  Bone Density Test Screening - (2018)  Bone Density Scan - (2017)  Colonoscopy Screening - (10/16/2017) hyperplastic polyps.  Colonoscopy - (2016)  Colonoscopy - (10/16/2017)  Colonoscopy - (2013)  Influenza Vaccination - ()  Psa Test - 2024  Prostate Exam - (2020)  Physical Exam -2024  Rectal Exam - (2020)  Influenza Vaccination - (10/3/2018)  EGD - [facility] SEE REPORT  Zostavax - (2014) SLIP GIVEN  Prevnar Vaccine - (2016) SLIP GIVEN  Pneumonia Vaccination - (2017)  2D ECHO - (2018)  Shingrix Vaccine (Shingles) - SLIP GIVEN  COVID VACCINE 3/2021  MEDICAL PROBLEMS:  Hypertension.  Hyperlipidemia.  Glucose intolerance.  Metabolic syndrome.  Gastroesophageal reflux disease.  EROSIVE GASTRITIS - EGD- 10/10 [facility] OMEPRAZOLE STARTED  LEFT SHOULDER PAIN- REFERRED TO MICHELE- SUSPECT RCT  CARPAL TUNNEL- WRIST SPLINT GIVEN   RIGHT INGUINAL HERNIA- REFERRED TO LAQUITA   COLONIC POLYPS   DENTAL CARIES/EROSION-REMINDED EACH VISIT TO OBTAIN DENTAL EVALUATION  PROXIMAL MUSCLE PAIN 17-[address]-PREDNISONE STARTED-PMR   AORTIC STENOSIS/PALPITATIONS/DIASTOLIC DYSFUNCTION- REFER TO CARDIOLOGY 3/21/18, again 2024  Tachycardia- 2019 holter

## 2024-12-04 NOTE — PROGRESS NOTES
Medicare Annual Wellness Visit    Chandana Sadnoval is here for Medicare AWV    Assessment & Plan     Recommendations for Preventive Services Due: see orders and patient instructions/AVS.  Recommended screening schedule for the next 5-10 years is provided to the patient in written form: see Patient Instructions/AVS.     No follow-ups on file.     Subjective       Patient's complete Health Risk Assessment and screening values have been reviewed and are found in Flowsheets. The following problems were reviewed today and where indicated follow up appointments were made and/or referrals ordered.    Positive Risk Factor Screenings with Interventions:    Fall Risk:  Do you feel unsteady or are you worried about falling? : (!) yes  2 or more falls in past year?: no  Fall with injury in past year?: no     Interventions:    Reviewed medications, home hazards, visual acuity, and co-morbidities that can increase risk for falls             Inactivity:  On average, how many days per week do you engage in moderate to strenuous exercise (like a brisk walk)?: 0 days (!) Abnormal  On average, how many minutes do you engage in exercise at this level?: 0 min    Interventions:  Patient advised to follow up in the office for further evaluation and treatment      Dentist Screen:  Have you seen the dentist within the past year?: (!) No    Intervention:  Advised to schedule with their dentist      Safety:  Do you have non-slip mats or non-slip surfaces or shower bars or grab bars in your shower or bathtub?: (!) No  Do you always fasten your seatbelt when you are in a car?: (!) No    Interventions:  Uses seat belt                   Objective   Vitals:    12/04/24 1305   Resp: 16   Weight: 97.5 kg (215 lb)   Height: 1.854 m (6' 1\")      Body mass index is 28.37 kg/m².                  No Known Allergies  Prior to Visit Medications    Medication Sig Taking? Authorizing Provider   omeprazole (PRILOSEC) 20 MG delayed release capsule TAKE 1

## 2024-12-04 NOTE — TELEPHONE ENCOUNTER
Received incoming call from patient. Spoke with patient. Patient stated, \"Can you check if I have an appointment today with Dr. Lagos at 1:00 pm?\" Informed patient that he is scheduled for an appointment today (12/4/2024) with Dr. Lagos at [facility] at 1:00 pm. Patient verbalized understanding and stated, \"OK see you then.\"    Last Appointment:  6/12/2024  Future Appointments   Date Time Provider Department Center   12/4/2024  1:00 PM Mario Lagos MD [facility]   12/4/2024  1:15 PM [facility]   12/11/2024 11:45 AM Rod Han MD [place] [facility]   12/16/2024 11:45 AM Conner Varela DPM Col Podiatry [facility]

## 2024-12-05 RX ORDER — METOPROLOL SUCCINATE 50 MG/1
50 TABLET, EXTENDED RELEASE ORAL DAILY
Qty: 90 TABLET | Refills: 3 | Status: SHIPPED | OUTPATIENT
Start: 2024-12-05

## 2024-12-05 NOTE — TELEPHONE ENCOUNTER
Name of Medication(s) Requested:  Requested Prescriptions     Pending Prescriptions Disp Refills    metoprolol succinate (TOPROL XL) 50 MG extended release tablet [Pharmacy Med Name: Metoprolol Succinate ER 50 MG Oral Tablet Extended Release 24 Hour] 90 tablet 3     Sig: TAKE 1 TABLET BY MOUTH DAILY       Medication is on current medication list Yes    Dosage and directions were verified? Yes    Quantity verified: 90 day supply     Pharmacy Verified?  Yes    Last Appointment:  12/4/2024    Future appts:  Future Appointments   Date Time Provider Department [place]   12/11/2024 11:45 AM Rod Han MD [place] [facility]   12/16/2024 11:45 AM Conner Varela DPM [facility]   6/10/2025  1:00 PM Mario Lagos MD [facility]        (If no appt send self scheduling link. .REFILLAPPT)  Scheduling request sent?     [] Yes  [x] No    Does patient need updated?  [] Yes  [x] No

## 2024-12-11 ENCOUNTER — OFFICE VISIT (OUTPATIENT)
Dept: CARDIOLOGY CLINIC | Age: 74
End: 2024-12-11
Payer: MEDICARE

## 2024-12-11 VITALS
HEART RATE: 69 BPM | OXYGEN SATURATION: 96 % | SYSTOLIC BLOOD PRESSURE: 122 MMHG | TEMPERATURE: 97.5 F | HEIGHT: 73 IN | RESPIRATION RATE: 18 BRPM | BODY MASS INDEX: 28.55 KG/M2 | WEIGHT: 215.4 LBS | DIASTOLIC BLOOD PRESSURE: 77 MMHG

## 2024-12-11 DIAGNOSIS — I35.0 AORTIC VALVE STENOSIS, ETIOLOGY OF CARDIAC VALVE DISEASE UNSPECIFIED: Primary | ICD-10-CM

## 2024-12-11 DIAGNOSIS — I48.0 PAROXYSMAL ATRIAL FIBRILLATION (HCC): ICD-10-CM

## 2024-12-11 PROCEDURE — 3078F DIAST BP <80 MM HG: CPT | Performed by: INTERNAL MEDICINE

## 2024-12-11 PROCEDURE — 1036F TOBACCO NON-USER: CPT | Performed by: INTERNAL MEDICINE

## 2024-12-11 PROCEDURE — 3074F SYST BP LT 130 MM HG: CPT | Performed by: INTERNAL MEDICINE

## 2024-12-11 PROCEDURE — 1123F ACP DISCUSS/DSCN MKR DOCD: CPT | Performed by: INTERNAL MEDICINE

## 2024-12-11 PROCEDURE — G8482 FLU IMMUNIZE ORDER/ADMIN: HCPCS | Performed by: INTERNAL MEDICINE

## 2024-12-11 PROCEDURE — 1160F RVW MEDS BY RX/DR IN RCRD: CPT | Performed by: INTERNAL MEDICINE

## 2024-12-11 PROCEDURE — 1159F MED LIST DOCD IN RCRD: CPT | Performed by: INTERNAL MEDICINE

## 2024-12-11 PROCEDURE — 93000 ELECTROCARDIOGRAM COMPLETE: CPT | Performed by: INTERNAL MEDICINE

## 2024-12-11 PROCEDURE — 99213 OFFICE O/P EST LOW 20 MIN: CPT | Performed by: INTERNAL MEDICINE

## 2024-12-11 PROCEDURE — G8417 CALC BMI ABV UP PARAM F/U: HCPCS | Performed by: INTERNAL MEDICINE

## 2024-12-11 PROCEDURE — 3017F COLORECTAL CA SCREEN DOC REV: CPT | Performed by: INTERNAL MEDICINE

## 2024-12-11 PROCEDURE — G8427 DOCREV CUR MEDS BY ELIG CLIN: HCPCS | Performed by: INTERNAL MEDICINE

## 2024-12-13 RX ORDER — ATORVASTATIN CALCIUM 40 MG/1
40 TABLET, FILM COATED ORAL DAILY
Qty: 90 TABLET | Refills: 3 | Status: SHIPPED | OUTPATIENT
Start: 2024-12-13

## 2024-12-16 ENCOUNTER — PROCEDURE VISIT (OUTPATIENT)
Dept: PODIATRY | Age: 74
End: 2024-12-16
Payer: MEDICARE

## 2024-12-16 VITALS
BODY MASS INDEX: 28.37 KG/M2 | HEART RATE: 68 BPM | WEIGHT: 215 LBS | SYSTOLIC BLOOD PRESSURE: 112 MMHG | DIASTOLIC BLOOD PRESSURE: 70 MMHG | OXYGEN SATURATION: 97 % | TEMPERATURE: 97.7 F

## 2024-12-16 DIAGNOSIS — G60.8 HEREDITARY SENSORY NEUROPATHY: ICD-10-CM

## 2024-12-16 DIAGNOSIS — M79.674 PAIN IN TOE OF RIGHT FOOT: ICD-10-CM

## 2024-12-16 DIAGNOSIS — G62.9 NEUROPATHY: Primary | ICD-10-CM

## 2024-12-16 DIAGNOSIS — I73.9 PERIPHERAL VASCULAR DISEASE, UNSPECIFIED (HCC): ICD-10-CM

## 2024-12-16 DIAGNOSIS — R73.03 PREDIABETES: ICD-10-CM

## 2024-12-16 DIAGNOSIS — Z79.01 ENCOUNTER FOR CURRENT LONG-TERM USE OF ANTICOAGULANTS: ICD-10-CM

## 2024-12-16 DIAGNOSIS — M79.674 PAIN IN RIGHT TOE(S): ICD-10-CM

## 2024-12-16 DIAGNOSIS — B35.1 TINEA UNGUIUM: ICD-10-CM

## 2024-12-16 DIAGNOSIS — M79.675 PAIN IN LEFT TOE(S): ICD-10-CM

## 2024-12-16 PROCEDURE — 1036F TOBACCO NON-USER: CPT | Performed by: PODIATRIST

## 2024-12-16 PROCEDURE — 1123F ACP DISCUSS/DSCN MKR DOCD: CPT | Performed by: PODIATRIST

## 2024-12-16 PROCEDURE — 3078F DIAST BP <80 MM HG: CPT | Performed by: PODIATRIST

## 2024-12-16 PROCEDURE — 99213 OFFICE O/P EST LOW 20 MIN: CPT | Performed by: PODIATRIST

## 2024-12-16 PROCEDURE — 3074F SYST BP LT 130 MM HG: CPT | Performed by: PODIATRIST

## 2024-12-16 PROCEDURE — G8482 FLU IMMUNIZE ORDER/ADMIN: HCPCS | Performed by: PODIATRIST

## 2024-12-16 PROCEDURE — 3017F COLORECTAL CA SCREEN DOC REV: CPT | Performed by: PODIATRIST

## 2024-12-16 PROCEDURE — G8427 DOCREV CUR MEDS BY ELIG CLIN: HCPCS | Performed by: PODIATRIST

## 2024-12-16 PROCEDURE — 1159F MED LIST DOCD IN RCRD: CPT | Performed by: PODIATRIST

## 2024-12-16 PROCEDURE — G8417 CALC BMI ABV UP PARAM F/U: HCPCS | Performed by: PODIATRIST

## 2024-12-16 PROCEDURE — 11721 DEBRIDE NAIL 6 OR MORE: CPT | Performed by: PODIATRIST

## 2024-12-16 RX ORDER — KETOCONAZOLE 20 MG/ML
SHAMPOO, SUSPENSION TOPICAL
Qty: 100 ML | Refills: 5 | Status: SHIPPED | OUTPATIENT
Start: 2024-12-16

## 2024-12-16 RX ORDER — PREGABALIN 200 MG/1
200 CAPSULE ORAL 3 TIMES DAILY
Qty: 270 CAPSULE | Refills: 0 | Status: SHIPPED | OUTPATIENT
Start: 2024-12-16 | End: 2025-03-16

## 2024-12-16 RX ORDER — KETOCONAZOLE 20 MG/G
CREAM TOPICAL
Qty: 30 G | Refills: 5 | Status: SHIPPED | OUTPATIENT
Start: 2024-12-16

## 2024-12-16 NOTE — PROGRESS NOTES
[facility]  [address] [place] [address]  Dept: [phone]  Dept Fax: [phone]    Diabetic  PATIENT PROGRESS NOTE  Date of patient's visit: 12/16/2024  Patient's Name:  Chandana Sandoval YOB: 1950            Patient Care Team:  Mario Lagos MD as PCP - General (Internal Medicine)  Mario Lagos MD as PCP - Empaneled Provider  Conner Varela DPM as Consulting Physician (Podiatry)        Chief Complaint   Patient presents with    Toe Pain     Toe Pain  Mario Lagos MD  12/4/2024         HPI:   Chandana Sandoval is a 74 y.o. male  Patient relates pain is Present.    Currently denies F/C/N/V. Pt's primary care physician is Mario Lagos MD last seen and is seen today for a  issue patient is having numbness in both feet this is been progressively lyrica     No Known Allergies    No past medical history on file.    Prior to Admission medications    Medication Sig Start Date End Date Taking? Authorizing Provider   atorvastatin (LIPITOR) 40 MG tablet TAKE 1 TABLET BY MOUTH DAILY 12/13/24  Yes Mario Lagos MD   metoprolol succinate (TOPROL XL) 50 MG extended release tablet TAKE 1 TABLET BY MOUTH DAILY 12/5/24  Yes Mario Lagos MD   omeprazole (PRILOSEC) 20 MG delayed release capsule TAKE 1 CAPSULE DAILY 11/4/24  Yes Mario Lagos MD   pregabalin (LYRICA) 200 MG capsule Take 1 capsule by mouth in the morning, at noon, and at bedtime for 90 days. Max Daily Amount: 600 mg 9/23/24 12/22/24 Yes Conner Varela DPM   apixaban (ELIQUIS) 5 MG TABS tablet TAKE 1 TABLET BY MOUTH TWICE  DAILY 8/17/24  Yes Mario Lagos MD   amLODIPine-benazepril (LOTREL) 10-20 MG per capsule TAKE 1 CAPSULE BY MOUTH DAILY 6/18/24  Yes Paola Hung APRN - CNP   ketoconazole (NIZORAL) 2 % shampoo Apply topically daily as needed. 1/18/24  Yes Conner Varela DPM   ketoconazole (NIZORAL) 2 % cream Apply topically daily. 1/18/24  Yes Avery,

## 2025-01-08 ENCOUNTER — TELEPHONE (OUTPATIENT)
Dept: ADMINISTRATIVE | Age: 75
End: 2025-01-08

## 2025-01-08 NOTE — TELEPHONE ENCOUNTER
Patient is calling to verify his appts for tomorrow. He said he doesn't remember scheduling an ECHO. I looked in his chart and don't see a current order for one nor do I see anything stating in his last OV with Dr. Han that he was to get one.    Can you check into it and see if this is needed?

## 2025-01-09 ENCOUNTER — OFFICE VISIT (OUTPATIENT)
Dept: CARDIOLOGY CLINIC | Age: 75
End: 2025-01-09
Payer: MEDICARE

## 2025-01-09 ENCOUNTER — HOSPITAL ENCOUNTER (OUTPATIENT)
Dept: CARDIOLOGY | Age: 75
Discharge: HOME | End: 2025-01-11
Payer: MEDICARE

## 2025-01-09 ENCOUNTER — OFFICE VISIT (OUTPATIENT)
Dept: CARDIOTHORACIC SURGERY | Age: 75
End: 2025-01-09
Payer: MEDICARE

## 2025-01-09 VITALS
RESPIRATION RATE: 20 BRPM | DIASTOLIC BLOOD PRESSURE: 78 MMHG | WEIGHT: 225 LBS | HEART RATE: 53 BPM | BODY MASS INDEX: 29.82 KG/M2 | HEIGHT: 73 IN | SYSTOLIC BLOOD PRESSURE: 139 MMHG

## 2025-01-09 DIAGNOSIS — I48.0 PAROXYSMAL ATRIAL FIBRILLATION (HCC): ICD-10-CM

## 2025-01-09 DIAGNOSIS — I35.0 NONRHEUMATIC AORTIC VALVE STENOSIS: Primary | ICD-10-CM

## 2025-01-09 DIAGNOSIS — I35.0 SEVERE AORTIC STENOSIS: Primary | ICD-10-CM

## 2025-01-09 PROCEDURE — 3017F COLORECTAL CA SCREEN DOC REV: CPT | Performed by: THORACIC SURGERY (CARDIOTHORACIC VASCULAR SURGERY)

## 2025-01-09 PROCEDURE — G8427 DOCREV CUR MEDS BY ELIG CLIN: HCPCS | Performed by: THORACIC SURGERY (CARDIOTHORACIC VASCULAR SURGERY)

## 2025-01-09 PROCEDURE — 3078F DIAST BP <80 MM HG: CPT | Performed by: THORACIC SURGERY (CARDIOTHORACIC VASCULAR SURGERY)

## 2025-01-09 PROCEDURE — 99214 OFFICE O/P EST MOD 30 MIN: CPT | Performed by: THORACIC SURGERY (CARDIOTHORACIC VASCULAR SURGERY)

## 2025-01-09 PROCEDURE — 99214 OFFICE O/P EST MOD 30 MIN: CPT | Performed by: INTERNAL MEDICINE

## 2025-01-09 PROCEDURE — G8417 CALC BMI ABV UP PARAM F/U: HCPCS | Performed by: THORACIC SURGERY (CARDIOTHORACIC VASCULAR SURGERY)

## 2025-01-09 PROCEDURE — G8417 CALC BMI ABV UP PARAM F/U: HCPCS | Performed by: INTERNAL MEDICINE

## 2025-01-09 PROCEDURE — 1123F ACP DISCUSS/DSCN MKR DOCD: CPT | Performed by: THORACIC SURGERY (CARDIOTHORACIC VASCULAR SURGERY)

## 2025-01-09 PROCEDURE — 1159F MED LIST DOCD IN RCRD: CPT | Performed by: THORACIC SURGERY (CARDIOTHORACIC VASCULAR SURGERY)

## 2025-01-09 PROCEDURE — G8428 CUR MEDS NOT DOCUMENT: HCPCS | Performed by: INTERNAL MEDICINE

## 2025-01-09 PROCEDURE — 1159F MED LIST DOCD IN RCRD: CPT | Performed by: INTERNAL MEDICINE

## 2025-01-09 PROCEDURE — 3075F SYST BP GE 130 - 139MM HG: CPT | Performed by: THORACIC SURGERY (CARDIOTHORACIC VASCULAR SURGERY)

## 2025-01-09 PROCEDURE — 1036F TOBACCO NON-USER: CPT | Performed by: INTERNAL MEDICINE

## 2025-01-09 PROCEDURE — 1123F ACP DISCUSS/DSCN MKR DOCD: CPT | Performed by: INTERNAL MEDICINE

## 2025-01-09 PROCEDURE — 93325 DOPPLER ECHO COLOR FLOW MAPG: CPT

## 2025-01-09 PROCEDURE — 1036F TOBACCO NON-USER: CPT | Performed by: THORACIC SURGERY (CARDIOTHORACIC VASCULAR SURGERY)

## 2025-01-09 PROCEDURE — 3017F COLORECTAL CA SCREEN DOC REV: CPT | Performed by: INTERNAL MEDICINE

## 2025-01-09 ASSESSMENT — ENCOUNTER SYMPTOMS
COUGH: 0
ABDOMINAL PAIN: 0
CHEST TIGHTNESS: 0
BACK PAIN: 0
NAUSEA: 0
VOMITING: 0
SHORTNESS OF BREATH: 0
BLOOD IN STOOL: 0

## 2025-01-09 NOTE — PROGRESS NOTES
[facility]  Visit Note      Patient name: Chandana Sandoval    Reason for consult: aortic stenosis    Referring Physician: Dr. Lagos    Primary Care Physician: Mario Lagos MD    Date of service: 1/9/2025    Chief Complaint: aortic stenosis    HPI: Mr. Sandoval is a 73 yo male who presents for follow up of aortic stenosis. PMH significant for permanent AFib, HTN, HLD, depresssion, lumbar radiculopathy    He had an echo done after a murmur was detected at visit with PCP in February 2024. The study suggested severe AS, and he was referred to Dr. Han for further evaluation at which time he denied any cardiac symptoms. He then had follow up with Dr. Lagos who advised valve clinic referral.    He was seen in June 2024 at which time he remained asymptomatic. He presents for 6-month follow up with echo today.    He denied any cardiac complaints at follow up with Dr. Han on 12/11/24.      TTE 5/28/24:    Left Ventricle: Normal left ventricular systolic function with a visually estimated EF of 60 - 65%. Left ventricle size is normal. Normal wall thickness. Normal wall motion. Indeterminate diastolic function.    Right Ventricle: Normal systolic function. TAPSE is 1.8 cm.    Aortic Valve: Not well visualized. Thickened cusp. Severely calcified cusp. Mild regurgitation. Moderate to severe stenosis of the aortic valve. AV mean gradient is 31 mmHg. AV peak velocity is 3.8 m/s. LVOT:AV VTI Index is 0.22. AV area by continuity VTI is 0.8 cm2. AV Stroke Volume index is 34.3 mL/m2.    Mitral Valve: Mild regurgitation.    Tricuspid Valve: Mild regurgitation. The estimated RVSP is 38 mmHg.    Right Atrium: Right atrium is moderately dilated.    Appears to be in atrial fibrillation.    Allergies: No Known Allergies    Home medications:    Current Outpatient Medications   Medication Sig Dispense Refill    pregabalin (LYRICA) 200 MG capsule Take 1 capsule by mouth in the morning, at noon, and at bedtime for 90

## 2025-01-09 NOTE — PROGRESS NOTES
diastolic function.    Right Ventricle: Normal systolic function. TAPSE is 1.8 cm.    Aortic Valve: Not well visualized. Thickened cusp. Severely calcified cusp. Mild regurgitation. Moderate to severe stenosis of the aortic valve. AV mean gradient is 31 mmHg. AV peak velocity is 3.8 m/s. LVOT:AV VTI Index is 0.22. AV area by continuity VTI is 0.8 cm2. AV Stroke Volume index is 34.3 mL/m2.    Mitral Valve: Mild regurgitation.    Tricuspid Valve: Mild regurgitation. The estimated RVSP is 38 mmHg.    Right Atrium: Right atrium is moderately dilated.    Appears to be in atrial fibrillation.    ECG 6/11/24:  AFib, QRS 86      LABORATORY DATA:   Lab Results   Component Value Date/Time    WBC 8.0 12/04/2024 02:06 PM    HGB 15.5 12/04/2024 02:06 PM    HCT 48.1 12/04/2024 02:06 PM     12/04/2024 02:06 PM      Lab Results   Component Value Date/Time     12/04/2024 02:06 PM    K 4.2 12/04/2024 02:06 PM    CO2 27 12/04/2024 02:06 PM    BUN 23 12/04/2024 02:06 PM    CREATININE 1.1 12/04/2024 02:06 PM    MG 2.0 10/14/2020 03:44 PM      No components found for: \"HGBA1C\"   No results found for: \"TSH\"   No components found for: \"LDLCALC\"         FRAILTY ASSESSMENT:   New York Heart Association (NYHA) Classification: I         ASSESSMENT & PLAN:     74 y.o. male referred by Dr. Lagos for AS. Past medical history of permanent AFib, HTN, HLD, depresssion, lumbar radiculopathy    Aortic stenosis:  Patient with moderate to severe asymptomatic AS. He continues to deny symptoms  F/U in 6 months with TTE or earlier if symptomatic      Sachin Delgadillo MD  Interventional cardiology / structural heart disease    [facility]  [address] [place] [id number] [phone]

## 2025-01-10 LAB
ECHO AR MAX VEL PISA: 4 M/S
ECHO AV AREA PEAK VELOCITY: 1.1 CM2
ECHO AV AREA VTI: 1.1 CM2
ECHO AV AREA/BSA PEAK VELOCITY: 0.5 CM2/M2
ECHO AV AREA/BSA VTI: 0.5 CM2/M2
ECHO AV CUSP MM: 0.9 CM
ECHO AV MEAN GRADIENT: 35 MMHG
ECHO AV MEAN VELOCITY: 2.8 M/S
ECHO AV PEAK GRADIENT: 57 MMHG
ECHO AV PEAK VELOCITY: 3.8 M/S
ECHO AV REGURGITANT PHT: 660.4 MILLISECOND
ECHO AV VELOCITY RATIO: 0.24
ECHO AV VTI: 94.9 CM
ECHO BSA: 2.29 M2
ECHO LA DIAMETER INDEX: 1.55 CM/M2
ECHO LA DIAMETER: 3.5 CM
ECHO LV EF PHYSICIAN: 60 %
ECHO LV FRACTIONAL SHORTENING: 41 % (ref 28–44)
ECHO LV INTERNAL DIMENSION DIASTOLE INDEX: 2.04 CM/M2
ECHO LV INTERNAL DIMENSION DIASTOLIC: 4.6 CM (ref 4.2–5.9)
ECHO LV INTERNAL DIMENSION SYSTOLIC INDEX: 1.19 CM/M2
ECHO LV INTERNAL DIMENSION SYSTOLIC: 2.7 CM
ECHO LV IVSD: 0.8 CM (ref 0.6–1)
ECHO LV IVSS: 1.3 CM
ECHO LV MASS 2D: 127.7 G (ref 88–224)
ECHO LV MASS INDEX 2D: 56.5 G/M2 (ref 49–115)
ECHO LV POSTERIOR WALL DIASTOLIC: 0.9 CM (ref 0.6–1)
ECHO LV POSTERIOR WALL SYSTOLIC: 1.2 CM
ECHO LV RELATIVE WALL THICKNESS RATIO: 0.39
ECHO LVOT AREA: 4.5 CM2
ECHO LVOT AV VTI INDEX: 0.24
ECHO LVOT DIAM: 2.4 CM
ECHO LVOT MEAN GRADIENT: 2 MMHG
ECHO LVOT PEAK GRADIENT: 3 MMHG
ECHO LVOT PEAK VELOCITY: 0.9 M/S
ECHO LVOT STROKE VOLUME INDEX: 46.4 ML/M2
ECHO LVOT SV: 104.9 ML
ECHO LVOT VTI: 23.2 CM
ECHO RV INTERNAL DIMENSION: 3.6 CM
ECHO TV REGURGITANT MAX VELOCITY: 2.59 M/S
ECHO TV REGURGITANT PEAK GRADIENT: 27 MMHG

## 2025-03-17 ENCOUNTER — PROCEDURE VISIT (OUTPATIENT)
Dept: PODIATRY | Age: 75
End: 2025-03-17
Payer: MEDICARE

## 2025-03-17 VITALS
HEART RATE: 62 BPM | WEIGHT: 225 LBS | DIASTOLIC BLOOD PRESSURE: 66 MMHG | BODY MASS INDEX: 29.69 KG/M2 | OXYGEN SATURATION: 97 % | SYSTOLIC BLOOD PRESSURE: 111 MMHG | TEMPERATURE: 97.3 F

## 2025-03-17 DIAGNOSIS — M79.674 PAIN IN RIGHT TOE(S): ICD-10-CM

## 2025-03-17 DIAGNOSIS — R73.03 PREDIABETES: ICD-10-CM

## 2025-03-17 DIAGNOSIS — M79.675 PAIN IN LEFT TOE(S): ICD-10-CM

## 2025-03-17 DIAGNOSIS — Z79.01 ENCOUNTER FOR CURRENT LONG-TERM USE OF ANTICOAGULANTS: ICD-10-CM

## 2025-03-17 DIAGNOSIS — I73.9 PERIPHERAL VASCULAR DISEASE, UNSPECIFIED: ICD-10-CM

## 2025-03-17 DIAGNOSIS — G62.9 NEUROPATHY: Primary | ICD-10-CM

## 2025-03-17 DIAGNOSIS — B35.1 TINEA UNGUIUM: ICD-10-CM

## 2025-03-17 PROCEDURE — G8427 DOCREV CUR MEDS BY ELIG CLIN: HCPCS | Performed by: PODIATRIST

## 2025-03-17 PROCEDURE — G8417 CALC BMI ABV UP PARAM F/U: HCPCS | Performed by: PODIATRIST

## 2025-03-17 PROCEDURE — 1036F TOBACCO NON-USER: CPT | Performed by: PODIATRIST

## 2025-03-17 PROCEDURE — 3017F COLORECTAL CA SCREEN DOC REV: CPT | Performed by: PODIATRIST

## 2025-03-17 PROCEDURE — 3074F SYST BP LT 130 MM HG: CPT | Performed by: PODIATRIST

## 2025-03-17 PROCEDURE — 1123F ACP DISCUSS/DSCN MKR DOCD: CPT | Performed by: PODIATRIST

## 2025-03-17 PROCEDURE — 99213 OFFICE O/P EST LOW 20 MIN: CPT | Performed by: PODIATRIST

## 2025-03-17 PROCEDURE — 11721 DEBRIDE NAIL 6 OR MORE: CPT | Performed by: PODIATRIST

## 2025-03-17 PROCEDURE — 1159F MED LIST DOCD IN RCRD: CPT | Performed by: PODIATRIST

## 2025-03-17 PROCEDURE — 3078F DIAST BP <80 MM HG: CPT | Performed by: PODIATRIST

## 2025-03-17 RX ORDER — PREGABALIN 200 MG/1
200 CAPSULE ORAL 3 TIMES DAILY
Qty: 270 CAPSULE | Refills: 0 | Status: SHIPPED | OUTPATIENT
Start: 2025-03-17 | End: 2025-06-15

## 2025-03-17 NOTE — PROGRESS NOTES
, Bilateral.  CFT <3 seconds, Bilateral.  Hair growth absent to the level of the digits, Bilateral.  Edema absent, Bilateral.  Varicosities present, Bilateral. Erythema absent, Bilateral   Sharp dull sensation decreased bilateral plantar aspect feet    Radiographs:       Asessment: Patient is a 75 y.o. male with:    Diagnosis Orders   1. Tinea unguium        2. Pain in left toe(s)        3. Pain in right toe(s)        4. Peripheral vascular disease, unspecified        5. Neuropathy  pregabalin (LYRICA) 200 MG capsule      6. Encounter for current long-term use of anticoagulants        7. Prediabetes                Chandana was seen today for toe pain.    Diagnoses and all orders for this visit:    Tinea unguium    Pain in left toe(s)    Pain in right toe(s)    Peripheral vascular disease, unspecified    Neuropathy  -     pregabalin (LYRICA) 200 MG capsule; Take 1 capsule by mouth in the morning, at noon, and at bedtime for 90 days. Max Daily Amount: 600 mg    Encounter for current long-term use of anticoagulants    Prediabetes             Plan:I did renew lyrica   Pt seen for  chronic pain   oarrs report reviewed I have reviewed current medication  and prior noted I believe the need for refill is warranted  I have reviewed oarrs report and found no drug seeking behavior   I have discussed all side effects and narcotic policy   with pt  pt understands and agrees with treatment  a follow up  appointment is made .  Current condition and treatment options discussed in detail.  Discussed conservative and surgical options with the patient. Advised pt to .  Verbal and written instructions given to patient. Contact office with any questions/problems/concerns.  RTC in 3month(s).    Debridement of all painful nails was performed with both manual and electrical debridement to prevent infection and/or ulceration.  Patient tolerated the debridement well, without any complaints.  1-5 bilateral   Patient was asymptomatic

## 2025-03-25 DIAGNOSIS — Z79.01 CHRONIC ANTICOAGULATION: ICD-10-CM

## 2025-03-25 DIAGNOSIS — E74.39 GLUCOSE INTOLERANCE: ICD-10-CM

## 2025-03-25 DIAGNOSIS — I48.0 PAROXYSMAL ATRIAL FIBRILLATION (HCC): ICD-10-CM

## 2025-03-25 DIAGNOSIS — R73.03 PREDIABETES: ICD-10-CM

## 2025-03-25 DIAGNOSIS — E66.811 CLASS 1 OBESITY DUE TO EXCESS CALORIES WITH SERIOUS COMORBIDITY AND BODY MASS INDEX (BMI) OF 30.0 TO 30.9 IN ADULT: ICD-10-CM

## 2025-03-25 DIAGNOSIS — I10 ESSENTIAL HYPERTENSION: ICD-10-CM

## 2025-03-25 DIAGNOSIS — E66.09 CLASS 1 OBESITY DUE TO EXCESS CALORIES WITH SERIOUS COMORBIDITY AND BODY MASS INDEX (BMI) OF 30.0 TO 30.9 IN ADULT: ICD-10-CM

## 2025-03-25 RX ORDER — OMEPRAZOLE 20 MG/1
20 CAPSULE, DELAYED RELEASE ORAL DAILY
Qty: 90 CAPSULE | Refills: 3 | Status: SHIPPED | OUTPATIENT
Start: 2025-03-25

## 2025-03-25 NOTE — TELEPHONE ENCOUNTER
Last Appointment:  12/4/2024  Future Appointments   Date Time Provider Department Center   6/10/2025  1:00 PM Mario Lagos MD [facility]   6/16/2025 11:30 AM Conner Varela DPM Col Podiatry [facility]

## 2025-05-05 RX ORDER — AMLODIPINE AND BENAZEPRIL HYDROCHLORIDE 10; 20 MG/1; MG/1
1 CAPSULE ORAL DAILY
Qty: 90 CAPSULE | Refills: 3 | Status: SHIPPED | OUTPATIENT
Start: 2025-05-05

## 2025-05-05 NOTE — TELEPHONE ENCOUNTER
Last Appointment:  8/1/2023  Future Appointments   Date Time Provider Department Center   6/10/2025  1:00 PM Mario Lagos MD [facility]   6/16/2025 11:30 AM Conner Varela DPM Col Podiatry [facility]

## 2025-06-10 ENCOUNTER — OFFICE VISIT (OUTPATIENT)
Dept: FAMILY MEDICINE CLINIC | Age: 75
End: 2025-06-10
Payer: MEDICARE

## 2025-06-10 VITALS
RESPIRATION RATE: 14 BRPM | WEIGHT: 208 LBS | HEART RATE: 64 BPM | TEMPERATURE: 97.4 F | OXYGEN SATURATION: 97 % | BODY MASS INDEX: 27.57 KG/M2 | HEIGHT: 73 IN | SYSTOLIC BLOOD PRESSURE: 122 MMHG | DIASTOLIC BLOOD PRESSURE: 62 MMHG

## 2025-06-10 DIAGNOSIS — I10 ESSENTIAL HYPERTENSION: Primary | ICD-10-CM

## 2025-06-10 DIAGNOSIS — Z79.01 CHRONIC ANTICOAGULATION: ICD-10-CM

## 2025-06-10 DIAGNOSIS — R73.03 PREDIABETES: ICD-10-CM

## 2025-06-10 DIAGNOSIS — I35.0 AORTIC VALVE STENOSIS, ETIOLOGY OF CARDIAC VALVE DISEASE UNSPECIFIED: ICD-10-CM

## 2025-06-10 DIAGNOSIS — L98.9 SKIN LESION OF RIGHT ARM: ICD-10-CM

## 2025-06-10 DIAGNOSIS — I48.0 PAROXYSMAL ATRIAL FIBRILLATION (HCC): ICD-10-CM

## 2025-06-10 DIAGNOSIS — I10 ESSENTIAL HYPERTENSION: ICD-10-CM

## 2025-06-10 LAB
ALBUMIN: 4.1 G/DL (ref 3.5–5.2)
ALP BLD-CCNC: 114 U/L (ref 40–129)
ALT SERPL-CCNC: 11 U/L (ref 0–50)
ANION GAP SERPL CALCULATED.3IONS-SCNC: 14 MMOL/L (ref 7–16)
AST SERPL-CCNC: 19 U/L (ref 0–50)
BASOPHILS ABSOLUTE: 0.04 K/UL (ref 0–0.2)
BASOPHILS RELATIVE PERCENT: 1 % (ref 0–2)
BILIRUB SERPL-MCNC: 0.8 MG/DL (ref 0–1.2)
BUN BLDV-MCNC: 17 MG/DL (ref 8–23)
CALCIUM SERPL-MCNC: 9.3 MG/DL (ref 8.8–10.2)
CHLORIDE BLD-SCNC: 98 MMOL/L (ref 98–107)
CO2: 26 MMOL/L (ref 22–29)
CREAT SERPL-MCNC: 1.1 MG/DL (ref 0.7–1.2)
CREATININE URINE: 65.8 MG/DL (ref 40–278)
EOSINOPHILS ABSOLUTE: 0.04 K/UL (ref 0.05–0.5)
EOSINOPHILS RELATIVE PERCENT: 1 % (ref 0–6)
GFR, ESTIMATED: 67 ML/MIN/1.73M2
GLUCOSE BLD-MCNC: 95 MG/DL (ref 74–99)
HBA1C MFR BLD: 6.1 % (ref 4–5.6)
HCT VFR BLD CALC: 45.9 % (ref 37–54)
HEMOGLOBIN: 15.2 G/DL (ref 12.5–16.5)
IMMATURE GRANULOCYTES %: 0 % (ref 0–5)
IMMATURE GRANULOCYTES ABSOLUTE: <0.03 K/UL (ref 0–0.58)
LYMPHOCYTES ABSOLUTE: 2.86 K/UL (ref 1.5–4)
LYMPHOCYTES RELATIVE PERCENT: 36 % (ref 20–42)
MCH RBC QN AUTO: 29.6 PG (ref 26–35)
MCHC RBC AUTO-ENTMCNC: 33.1 G/DL (ref 32–34.5)
MCV RBC AUTO: 89.3 FL (ref 80–99.9)
MICROALBUMIN/CREAT 24H UR: 18 MG/L (ref 0–20)
MICROALBUMIN/CREAT UR-RTO: 27 MCG/MG CREAT (ref 0–30)
MONOCYTES ABSOLUTE: 0.61 K/UL (ref 0.1–0.95)
MONOCYTES RELATIVE PERCENT: 8 % (ref 2–12)
NEUTROPHILS ABSOLUTE: 4.31 K/UL (ref 1.8–7.3)
NEUTROPHILS RELATIVE PERCENT: 55 % (ref 43–80)
PDW BLD-RTO: 14 % (ref 11.5–15)
PLATELET # BLD: 190 K/UL (ref 130–450)
PMV BLD AUTO: 11.7 FL (ref 7–12)
POTASSIUM SERPL-SCNC: 4 MMOL/L (ref 3.5–5.1)
RBC # BLD: 5.14 M/UL (ref 3.8–5.8)
SODIUM BLD-SCNC: 138 MMOL/L (ref 136–145)
TOTAL PROTEIN: 7.4 G/DL (ref 6.4–8.3)
WBC # BLD: 7.9 K/UL (ref 4.5–11.5)

## 2025-06-10 PROCEDURE — 3017F COLORECTAL CA SCREEN DOC REV: CPT | Performed by: INTERNAL MEDICINE

## 2025-06-10 PROCEDURE — G8417 CALC BMI ABV UP PARAM F/U: HCPCS | Performed by: INTERNAL MEDICINE

## 2025-06-10 PROCEDURE — G8427 DOCREV CUR MEDS BY ELIG CLIN: HCPCS | Performed by: INTERNAL MEDICINE

## 2025-06-10 PROCEDURE — 1036F TOBACCO NON-USER: CPT | Performed by: INTERNAL MEDICINE

## 2025-06-10 PROCEDURE — 3078F DIAST BP <80 MM HG: CPT | Performed by: INTERNAL MEDICINE

## 2025-06-10 PROCEDURE — 1123F ACP DISCUSS/DSCN MKR DOCD: CPT | Performed by: INTERNAL MEDICINE

## 2025-06-10 PROCEDURE — 3074F SYST BP LT 130 MM HG: CPT | Performed by: INTERNAL MEDICINE

## 2025-06-10 PROCEDURE — 99214 OFFICE O/P EST MOD 30 MIN: CPT | Performed by: INTERNAL MEDICINE

## 2025-06-10 PROCEDURE — 1159F MED LIST DOCD IN RCRD: CPT | Performed by: INTERNAL MEDICINE

## 2025-06-10 SDOH — ECONOMIC STABILITY: FOOD INSECURITY: WITHIN THE PAST 12 MONTHS, YOU WORRIED THAT YOUR FOOD WOULD RUN OUT BEFORE YOU GOT MONEY TO BUY MORE.: NEVER TRUE

## 2025-06-10 SDOH — ECONOMIC STABILITY: FOOD INSECURITY: WITHIN THE PAST 12 MONTHS, THE FOOD YOU BOUGHT JUST DIDN'T LAST AND YOU DIDN'T HAVE MONEY TO GET MORE.: NEVER TRUE

## 2025-06-10 ASSESSMENT — PATIENT HEALTH QUESTIONNAIRE - PHQ9
1. LITTLE INTEREST OR PLEASURE IN DOING THINGS: NOT AT ALL
5. POOR APPETITE OR OVEREATING: NOT AT ALL
8. MOVING OR SPEAKING SO SLOWLY THAT OTHER PEOPLE COULD HAVE NOTICED. OR THE OPPOSITE, BEING SO FIGETY OR RESTLESS THAT YOU HAVE BEEN MOVING AROUND A LOT MORE THAN USUAL: NOT AT ALL
SUM OF ALL RESPONSES TO PHQ QUESTIONS 1-9: 0
SUM OF ALL RESPONSES TO PHQ QUESTIONS 1-9: 0
3. TROUBLE FALLING OR STAYING ASLEEP: NOT AT ALL
4. FEELING TIRED OR HAVING LITTLE ENERGY: NOT AT ALL
9. THOUGHTS THAT YOU WOULD BE BETTER OFF DEAD, OR OF HURTING YOURSELF: NOT AT ALL
2. FEELING DOWN, DEPRESSED OR HOPELESS: NOT AT ALL
10. IF YOU CHECKED OFF ANY PROBLEMS, HOW DIFFICULT HAVE THESE PROBLEMS MADE IT FOR YOU TO DO YOUR WORK, TAKE CARE OF THINGS AT HOME, OR GET ALONG WITH OTHER PEOPLE: NOT DIFFICULT AT ALL
SUM OF ALL RESPONSES TO PHQ QUESTIONS 1-9: 0
SUM OF ALL RESPONSES TO PHQ QUESTIONS 1-9: 0
7. TROUBLE CONCENTRATING ON THINGS, SUCH AS READING THE NEWSPAPER OR WATCHING TELEVISION: NOT AT ALL
6. FEELING BAD ABOUT YOURSELF - OR THAT YOU ARE A FAILURE OR HAVE LET YOURSELF OR YOUR FAMILY DOWN: NOT AT ALL

## 2025-06-10 NOTE — PROGRESS NOTES
19  Chandana Sandoval : 1950 Sex: male  Age: 75 y.o.    Chief Complaint   Patient presents with    Follow-up     No questions or concerns today. No refills needed at this time.            This is a patient with moderate to severe aortic stenosis.  Remarkably he is not having any symptoms whatsoever.  He denies any syncope, near syncope, chest pain, shortness of breath.  Patient states even in warmer weather when he is working outside he does not become lightheaded.  Patient does have a history of prediabetes.  His A1c will be measured today.  He will also have a urine for microalbumin.  The patient is not having any foaminess to his urine.  He denies excessive thirst, urination, hunger.  His vaccinations are up-to-date.  PSA was just done last December.  He has not had recurrent symptoms of polymyalgia rheumatica.  He is stressed somewhat by the care of his wife who has end-stage cancer.    Hypertension    Hyperlipidemia        Review of Systems  PMH:  Health Maintenance:  Bone Density Test Screening - (2018)  Bone Density Scan - (2017)  Colonoscopy Screening - (10/16/2017) hyperplastic polyps.  Colonoscopy - (2016)  Colonoscopy - (10/16/2017)  Colonoscopy - (2013)  Influenza Vaccination - ()  Psa Test - 2024  Prostate Exam - (2020)  Physical Exam -2024  Rectal Exam - (2020)  Influenza Vaccination - (10/3/2018)  EGD [facility] SEE REPORT  Zostavax - (2014) SLIP GIVEN  Prevnar Vaccine - (2016) SLIP GIVEN  Pneumonia Vaccination - (2017)  2D ECHO - (2018)  Shingrix Vaccine (Shingles) - SLIP GIVEN  COVID VACCINE 3/2021  MEDICAL PROBLEMS:  Hypertension.  Hyperlipidemia.  Glucose intolerance.  Metabolic syndrome.  Gastroesophageal reflux disease.  EROSIVE GASTRITIS - EGD- 10/10 [facility] OMEPRAZOLE STARTED  LEFT SHOULDER PAIN- REFERRED TO MICHELE- SUSPECT RCT  CARPAL TUNNEL- WRIST SPLINT GIVEN   RIGHT INGUINAL HERNIA- REFERRED TO LAQUITA   COLONIC

## 2025-06-11 ENCOUNTER — RESULTS FOLLOW-UP (OUTPATIENT)
Dept: FAMILY MEDICINE CLINIC | Age: 75
End: 2025-06-11

## 2025-06-16 ENCOUNTER — PROCEDURE VISIT (OUTPATIENT)
Dept: PODIATRY | Age: 75
End: 2025-06-16
Payer: MEDICARE

## 2025-06-16 VITALS
BODY MASS INDEX: 27.44 KG/M2 | SYSTOLIC BLOOD PRESSURE: 110 MMHG | DIASTOLIC BLOOD PRESSURE: 63 MMHG | WEIGHT: 208 LBS | TEMPERATURE: 98.3 F

## 2025-06-16 DIAGNOSIS — G62.9 NEUROPATHY: Primary | ICD-10-CM

## 2025-06-16 DIAGNOSIS — Z79.01 ENCOUNTER FOR CURRENT LONG-TERM USE OF ANTICOAGULANTS: ICD-10-CM

## 2025-06-16 DIAGNOSIS — B35.1 TINEA UNGUIUM: ICD-10-CM

## 2025-06-16 DIAGNOSIS — I73.9 PERIPHERAL VASCULAR DISEASE, UNSPECIFIED: ICD-10-CM

## 2025-06-16 DIAGNOSIS — M79.675 PAIN IN LEFT TOE(S): ICD-10-CM

## 2025-06-16 DIAGNOSIS — G60.8 HEREDITARY SENSORY NEUROPATHY: ICD-10-CM

## 2025-06-16 DIAGNOSIS — R73.03 PREDIABETES: ICD-10-CM

## 2025-06-16 DIAGNOSIS — M79.674 PAIN IN RIGHT TOE(S): ICD-10-CM

## 2025-06-16 PROCEDURE — 99213 OFFICE O/P EST LOW 20 MIN: CPT | Performed by: PODIATRIST

## 2025-06-16 PROCEDURE — G8427 DOCREV CUR MEDS BY ELIG CLIN: HCPCS | Performed by: PODIATRIST

## 2025-06-16 PROCEDURE — 1123F ACP DISCUSS/DSCN MKR DOCD: CPT | Performed by: PODIATRIST

## 2025-06-16 PROCEDURE — 3078F DIAST BP <80 MM HG: CPT | Performed by: PODIATRIST

## 2025-06-16 PROCEDURE — 11721 DEBRIDE NAIL 6 OR MORE: CPT | Performed by: PODIATRIST

## 2025-06-16 PROCEDURE — G8417 CALC BMI ABV UP PARAM F/U: HCPCS | Performed by: PODIATRIST

## 2025-06-16 PROCEDURE — 3017F COLORECTAL CA SCREEN DOC REV: CPT | Performed by: PODIATRIST

## 2025-06-16 PROCEDURE — 1159F MED LIST DOCD IN RCRD: CPT | Performed by: PODIATRIST

## 2025-06-16 PROCEDURE — 3074F SYST BP LT 130 MM HG: CPT | Performed by: PODIATRIST

## 2025-06-16 PROCEDURE — 1036F TOBACCO NON-USER: CPT | Performed by: PODIATRIST

## 2025-06-16 RX ORDER — PREGABALIN 200 MG/1
200 CAPSULE ORAL 3 TIMES DAILY
Qty: 270 CAPSULE | Refills: 0 | Status: SHIPPED | OUTPATIENT
Start: 2025-06-16 | End: 2025-09-14

## 2025-06-16 NOTE — PROGRESS NOTES
[facility]  [address] [place] [address]  Dept: [phone]  Dept Fax: [phone]    Diabetic  PATIENT PROGRESS NOTE  Date of patient's visit: 6/16/2025  Patient's Name:  Chandana Sandoval YOB: 1950            Patient Care Team:  Mario Lagos MD as PCP - General (Internal Medicine)  Mario Lagos MD as PCP - Empaneled Provider  Conner Varela DPM as Consulting Physician (Podiatry)        Chief Complaint   Patient presents with    Toe Pain    Foot Pain     Mario Lagos MD  LOV 6/10/25         HPI:   Chandana Sandoval is a 75 y.o. male  Patient relates pain is Present.    Currently denies F/C/N/V. Pt's primary care physician is aMrio Lagos MD last seen and is seen today for a  issue patient is having numbness in both feet this is been progressively lyrica     No Known Allergies    No past medical history on file.    Prior to Admission medications    Medication Sig Start Date End Date Taking? Authorizing Provider   pregabalin (LYRICA) 200 MG capsule Take 1 capsule by mouth in the morning, at noon, and at bedtime for 90 days. Max Daily Amount: 600 mg 6/16/25 9/14/25 Yes Conner Varela DPM   amLODIPine-benazepril (LOTREL) 10-20 MG per capsule TAKE 1 CAPSULE BY MOUTH DAILY 5/5/25  Yes Mario Lagos MD   omeprazole (PRILOSEC) 20 MG delayed release capsule TAKE 1 CAPSULE BY MOUTH DAILY 3/25/25  Yes Mario Lagos MD   ketoconazole (NIZORAL) 2 % shampoo Apply topically daily as needed. 12/16/24  Yes Conner Varela DPM   ketoconazole (NIZORAL) 2 % cream Apply topically daily. 12/16/24  Yes Conner Varela DPM   atorvastatin (LIPITOR) 40 MG tablet TAKE 1 TABLET BY MOUTH DAILY 12/13/24  Yes Mario Lagos MD   metoprolol succinate (TOPROL XL) 50 MG extended release tablet TAKE 1 TABLET BY MOUTH DAILY 12/5/24  Yes Mario Lagos MD   apixaban (ELIQUIS) 5 MG TABS tablet TAKE 1 TABLET BY MOUTH TWICE  DAILY 8/17/24  Yes

## 2025-06-20 ENCOUNTER — OFFICE VISIT (OUTPATIENT)
Dept: FAMILY MEDICINE CLINIC | Age: 75
End: 2025-06-20
Payer: MEDICARE

## 2025-06-20 VITALS
HEIGHT: 73 IN | TEMPERATURE: 97.7 F | OXYGEN SATURATION: 96 % | BODY MASS INDEX: 27.43 KG/M2 | SYSTOLIC BLOOD PRESSURE: 104 MMHG | HEART RATE: 54 BPM | DIASTOLIC BLOOD PRESSURE: 60 MMHG | RESPIRATION RATE: 18 BRPM | WEIGHT: 207 LBS

## 2025-06-20 DIAGNOSIS — M25.552 LEFT HIP PAIN: Primary | ICD-10-CM

## 2025-06-20 DIAGNOSIS — M25.562 CHRONIC PAIN OF LEFT KNEE: ICD-10-CM

## 2025-06-20 DIAGNOSIS — M25.561 CHRONIC PAIN OF RIGHT KNEE: ICD-10-CM

## 2025-06-20 DIAGNOSIS — G89.29 CHRONIC PAIN OF LEFT KNEE: ICD-10-CM

## 2025-06-20 DIAGNOSIS — G89.29 CHRONIC PAIN OF LEFT ANKLE: ICD-10-CM

## 2025-06-20 DIAGNOSIS — G89.29 CHRONIC PAIN OF RIGHT KNEE: ICD-10-CM

## 2025-06-20 DIAGNOSIS — M25.572 CHRONIC PAIN OF LEFT ANKLE: ICD-10-CM

## 2025-06-20 PROCEDURE — G8427 DOCREV CUR MEDS BY ELIG CLIN: HCPCS | Performed by: INTERNAL MEDICINE

## 2025-06-20 PROCEDURE — 1159F MED LIST DOCD IN RCRD: CPT | Performed by: INTERNAL MEDICINE

## 2025-06-20 PROCEDURE — 1036F TOBACCO NON-USER: CPT | Performed by: INTERNAL MEDICINE

## 2025-06-20 PROCEDURE — 3074F SYST BP LT 130 MM HG: CPT | Performed by: INTERNAL MEDICINE

## 2025-06-20 PROCEDURE — G8417 CALC BMI ABV UP PARAM F/U: HCPCS | Performed by: INTERNAL MEDICINE

## 2025-06-20 PROCEDURE — 1123F ACP DISCUSS/DSCN MKR DOCD: CPT | Performed by: INTERNAL MEDICINE

## 2025-06-20 PROCEDURE — 3078F DIAST BP <80 MM HG: CPT | Performed by: INTERNAL MEDICINE

## 2025-06-20 PROCEDURE — 3017F COLORECTAL CA SCREEN DOC REV: CPT | Performed by: INTERNAL MEDICINE

## 2025-06-20 PROCEDURE — 99213 OFFICE O/P EST LOW 20 MIN: CPT | Performed by: INTERNAL MEDICINE

## 2025-06-20 NOTE — PROGRESS NOTES
19  Chandana Sandoval : 1950 Sex: male  Age: 75 y.o.    Chief Complaint   Patient presents with    Spasms     Upper left leg muscle spasms that occur if he is sitting in the van and occassionaly when he is at home.            The patient did not mention to me that he was having leg pain when he visited me earlier in the week.  His daughter came in also on a visit and said he is having severe pain so he is here today to assess this.  He has both left and right leg pain.  Most of his pain is in the groin area on the left.  There is been no known mechanism of injury.  He does have arthritic changes including some flexion contractures of both knees.  He denies any falls or other issues.  There is no neurogenic or vascular claudication symptomatology    Hypertension    Hyperlipidemia        Review of Systems  PMH:  Health Maintenance:  Bone Density Test Screening - (2018)  Bone Density Scan - (2017)  Colonoscopy Screening - (10/16/2017) hyperplastic polyps.  Colonoscopy - (2016)  Colonoscopy - (10/16/2017)  Colonoscopy - (2013)  Influenza Vaccination - ()  Psa Test - 2024  Prostate Exam - (2020)  Physical Exam -2024  Rectal Exam - (2020)  Influenza Vaccination - (10/3/2018)  EGD [facility] SEE REPORT  Zostavax - (2014) SLIP GIVEN  Prevnar Vaccine - (2016) SLIP GIVEN  Pneumonia Vaccination - (2017)  2D ECHO - (2018)  Shingrix Vaccine (Shingles) - SLIP GIVEN  COVID VACCINE 3/2021  MEDICAL PROBLEMS:  Hypertension.  Hyperlipidemia.  Glucose intolerance.  Metabolic syndrome.  Gastroesophageal reflux disease.  EROSIVE GASTRITIS - EGD- 10/10 [facility] OMEPRAZOLE STARTED  LEFT SHOULDER PAIN- REFERRED TO MICHELE- SUSPECT RCT  CARPAL TUNNEL- WRIST SPLINT GIVEN   RIGHT INGUINAL HERNIA- REFERRED TO LAQUITA   COLONIC POLYPS   DENTAL CARIES/EROSION-REMINDED EACH VISIT TO OBTAIN DENTAL EVALUATION  PROXIMAL MUSCLE PAIN [address]-PREDNISONE

## 2025-06-22 ENCOUNTER — RESULTS FOLLOW-UP (OUTPATIENT)
Dept: PRIMARY CARE CLINIC | Age: 75
End: 2025-06-22

## 2025-06-22 DIAGNOSIS — M16.12 ARTHRITIS OF LEFT HIP: Primary | ICD-10-CM

## 2025-07-01 ENCOUNTER — OFFICE VISIT (OUTPATIENT)
Dept: ORTHOPEDIC SURGERY | Age: 75
End: 2025-07-01
Payer: MEDICARE

## 2025-07-01 VITALS
HEART RATE: 92 BPM | OXYGEN SATURATION: 98 % | SYSTOLIC BLOOD PRESSURE: 117 MMHG | TEMPERATURE: 97.8 F | RESPIRATION RATE: 16 BRPM | DIASTOLIC BLOOD PRESSURE: 73 MMHG

## 2025-07-01 DIAGNOSIS — M16.12 PRIMARY OSTEOARTHRITIS OF LEFT HIP: Primary | ICD-10-CM

## 2025-07-01 PROCEDURE — G8417 CALC BMI ABV UP PARAM F/U: HCPCS | Performed by: STUDENT IN AN ORGANIZED HEALTH CARE EDUCATION/TRAINING PROGRAM

## 2025-07-01 PROCEDURE — 3078F DIAST BP <80 MM HG: CPT | Performed by: STUDENT IN AN ORGANIZED HEALTH CARE EDUCATION/TRAINING PROGRAM

## 2025-07-01 PROCEDURE — G8427 DOCREV CUR MEDS BY ELIG CLIN: HCPCS | Performed by: STUDENT IN AN ORGANIZED HEALTH CARE EDUCATION/TRAINING PROGRAM

## 2025-07-01 PROCEDURE — 3017F COLORECTAL CA SCREEN DOC REV: CPT | Performed by: STUDENT IN AN ORGANIZED HEALTH CARE EDUCATION/TRAINING PROGRAM

## 2025-07-01 PROCEDURE — 1159F MED LIST DOCD IN RCRD: CPT | Performed by: STUDENT IN AN ORGANIZED HEALTH CARE EDUCATION/TRAINING PROGRAM

## 2025-07-01 PROCEDURE — 1123F ACP DISCUSS/DSCN MKR DOCD: CPT | Performed by: STUDENT IN AN ORGANIZED HEALTH CARE EDUCATION/TRAINING PROGRAM

## 2025-07-01 PROCEDURE — 1125F AMNT PAIN NOTED PAIN PRSNT: CPT | Performed by: STUDENT IN AN ORGANIZED HEALTH CARE EDUCATION/TRAINING PROGRAM

## 2025-07-01 PROCEDURE — 99203 OFFICE O/P NEW LOW 30 MIN: CPT | Performed by: STUDENT IN AN ORGANIZED HEALTH CARE EDUCATION/TRAINING PROGRAM

## 2025-07-01 PROCEDURE — 3074F SYST BP LT 130 MM HG: CPT | Performed by: STUDENT IN AN ORGANIZED HEALTH CARE EDUCATION/TRAINING PROGRAM

## 2025-07-01 PROCEDURE — 1036F TOBACCO NON-USER: CPT | Performed by: STUDENT IN AN ORGANIZED HEALTH CARE EDUCATION/TRAINING PROGRAM

## 2025-07-01 NOTE — PROGRESS NOTES
option discussed is hip replacement surgery. A special type of hip replacement that does not require cutting any muscle was explained, which allows for faster recovery and less pain. Timing of the surgery should be considered based on personal circumstances and discussed with family.    At this point he does feel that the pain is becoming substantial as well as limiting his ability to perform activities.  He would like to proceed with left total hip arthroplasty unfortunately this is a bad time due to his aging wife with advanced cancer.  He would like to follow-up in the end of August for repeat evaluation and delineation of timing of surgery at that point.    2. Aortic stenosis:  Currently taking Eliquis 5 mg twice a day for aortic stenosis. No changes to this medication regimen were discussed during this visit.    When surgery does come time he will need clearance from his family physician as well as cardiology.  He does state he has children that we will be able to help out during the time of surgery.    Follow-up: 08/2025 to reassess condition and discuss potential surgery.            Donavon Spangler DO   Orthopaedic Surgery   7/1/25  11:39 AM

## 2025-07-09 ENCOUNTER — TELEPHONE (OUTPATIENT)
Dept: PRIMARY CARE CLINIC | Age: 75
End: 2025-07-09

## 2025-07-09 ENCOUNTER — TELEPHONE (OUTPATIENT)
Dept: ADMINISTRATIVE | Age: 75
End: 2025-07-09

## 2025-07-09 PROBLEM — M16.12 PRIMARY OSTEOARTHRITIS OF LEFT HIP: Status: ACTIVE | Noted: 2025-07-09

## 2025-07-09 NOTE — TELEPHONE ENCOUNTER
LMOM TO SCHEDULE PRE OP CLEARANCE. PT IS SCHEDULED FOR 8/13/25. THERE ARE OPENINGS 9/29 AND AFTER

## 2025-07-09 NOTE — TELEPHONE ENCOUNTER
Pt is having a left hip replacement on 8/13 by Dr. Spangler.  Dr. Spangler's office is faxing papers for cardiac clearance.  Please contact pt.

## 2025-07-10 ENCOUNTER — TELEPHONE (OUTPATIENT)
Dept: ADMINISTRATIVE | Age: 75
End: 2025-07-10

## 2025-07-10 NOTE — TELEPHONE ENCOUNTER
Patient having surgery 8/13/2025 for left hip and needs Cardiac clearance approved from Dr. Spangler. Stated Dr. Spangler should be sending clearance form. Please advise

## 2025-07-15 ENCOUNTER — TELEPHONE (OUTPATIENT)
Dept: CARDIOLOGY CLINIC | Age: 75
End: 2025-07-15

## 2025-07-25 DIAGNOSIS — I48.0 PAROXYSMAL ATRIAL FIBRILLATION (HCC): ICD-10-CM

## 2025-07-28 NOTE — TELEPHONE ENCOUNTER
Name of Medication(s) Requested:  Requested Prescriptions     Pending Prescriptions Disp Refills    ELIQUIS 5 MG TABS tablet [Pharmacy Med Name: Eliquis 5 MG Oral Tablet] 180 tablet 3     Sig: TAKE 1 TABLET BY MOUTH TWICE  DAILY       Medication is on current medication list Yes    Dosage and directions were verified? Yes    Quantity verified: 90 day supply     Pharmacy Verified?  Yes    Last Appointment:  6/20/2025    Future appts:  Future Appointments   Date Time Provider Department Center   7/31/2025 12:00 PM SEY YNG ECHO 1 SEYZ CARD[facility] Rad[facility]Car   7/31/2025  1:15 PM Chanel Allen DO CARDIO SURG [facility]   7/31/2025  1:15 PM Sachin Delgadillo MD [place] CARDIO [facility]   8/4/2025  8:00 AM [facility] ROOM 1 [facility]   8/6/2025 10:30 AM Mario Lagos MD [facility] ECC DEP   8/7/2025  1:45 PM Donavon Spangler DO SE BDM ORTHO [facility]   8/21/2025  2:45 PM Sami Logan PA-C [facility] ORTHO [facility]   8/28/2025 10:30 AM Sami Logan PA-C [facility] ORTHO [facility]   9/8/2025 11:30 AM Conner Varela, DPKASSY Col Podiatry [facility]   12/17/2025  1:30 PM Mario Lagos MD [facility] ECC DEP        (If no appt send self scheduling link. .REFILLAPPT)  Scheduling request sent?     [] Yes  [x] No    Does patient need updated?  [] Yes  [x] No

## 2025-07-29 RX ORDER — APIXABAN 5 MG/1
5 TABLET, FILM COATED ORAL 2 TIMES DAILY
Qty: 180 TABLET | Refills: 3 | Status: SHIPPED | OUTPATIENT
Start: 2025-07-29

## 2025-07-30 ENCOUNTER — TELEPHONE (OUTPATIENT)
Dept: PHARMACY | Facility: CLINIC | Age: 75
End: 2025-07-30

## 2025-07-30 NOTE — TELEPHONE ENCOUNTER
[facility] CLINICAL PHARMACY: ADHERENCE REVIEW  Identified care gap per [employer] fills at [facility]: ACE/ARB adherence    Patient also appears to be prescribed: Statin    ASSESSMENT    ACE/ARB ADHERENCE    Insurance Records claims through 2025 (Prior Year PDC = 100% - PASSED; YTD PDC = FIRST FILL; Potential Fail Date: 25):   Amlodipine-benazepril 10-20 mg cap last filled on 3/9/25 for 90 day supply. Next refill due: 25    Prescribed si tablet/capsule daily    Per Reconcile Dispense History: (seemingly incomplete)  AMLODIPINE BESYLATE/BENAZEPRIL HYDR OCHLORIDE 10-20 MG CAPS 2024 90 90 capsule Paola Hung APRN - CNP [facility], [facility]   Last Rx 5/5/25 x 90 day supply 3 refills sent to [facility] home delivery - believe should have this Rx on file     % with last claim 12/12/24 x 90 days at [facility] [place], max refill due date 25 - may not actually be due until ~25    BP Readings from Last 3 Encounters:   25 117/73   25 104/60   25 110/63     Estimated Creatinine Clearance: 67 mL/min (based on SCr of 1.1 mg/dL).  Lab Results   Component Value Date    CREATININE 1.1 06/10/2025     Lab Results   Component Value Date    K 4.0 06/10/2025     STATIN ADHERENCE    Insurance Records claims through 2025 (Prior Year PDC = 100% - PASSED; YTD PDC = 100%; Potential Fail Date: 11/3/25):   Atorvastatin 40 mg tab last filled on 25 for 90 day supply. Next refill due: 25    PLAN  The following are interventions that have been identified:   Patient OVERDUE refilling amlodipine-benazepril and active on home medication list.   One fill  See above based on  % and max refill due date may have surplus and not actually due until ~25? Incomplete fill history in reconcile dispense to assess  Should have 25 Rx at pharmacy for refill when needed    Attempting to reach patient to review.  Left message asking for return call.    Letter sent.    Steph

## 2025-07-31 ENCOUNTER — OFFICE VISIT (OUTPATIENT)
Dept: CARDIOTHORACIC SURGERY | Age: 75
End: 2025-07-31
Payer: MEDICARE

## 2025-07-31 ENCOUNTER — HOSPITAL ENCOUNTER (OUTPATIENT)
Dept: CARDIOLOGY | Age: 75
Discharge: HOME | End: 2025-08-02
Payer: MEDICARE

## 2025-07-31 ENCOUNTER — OFFICE VISIT (OUTPATIENT)
Dept: CARDIOLOGY CLINIC | Age: 75
End: 2025-07-31

## 2025-07-31 VITALS
RESPIRATION RATE: 18 BRPM | HEIGHT: 73 IN | OXYGEN SATURATION: 96 % | BODY MASS INDEX: 27.43 KG/M2 | SYSTOLIC BLOOD PRESSURE: 115 MMHG | WEIGHT: 207 LBS | DIASTOLIC BLOOD PRESSURE: 71 MMHG | HEART RATE: 77 BPM

## 2025-07-31 DIAGNOSIS — I35.0 NONRHEUMATIC AORTIC VALVE STENOSIS: Primary | ICD-10-CM

## 2025-07-31 DIAGNOSIS — I35.0 NONRHEUMATIC AORTIC VALVE STENOSIS: ICD-10-CM

## 2025-07-31 DIAGNOSIS — I35.0 SEVERE AORTIC STENOSIS: Primary | ICD-10-CM

## 2025-07-31 LAB
ECHO AR MAX VEL PISA: 3.7 M/S
ECHO AV AREA PEAK VELOCITY: 1 CM2
ECHO AV AREA VTI: 1 CM2
ECHO AV AREA/BSA PEAK VELOCITY: 0.5 CM2/M2
ECHO AV AREA/BSA VTI: 0.5 CM2/M2
ECHO AV CUSP MM: 0.6 CM
ECHO AV MEAN GRADIENT: 45 MMHG
ECHO AV MEAN VELOCITY: 3.3 M/S
ECHO AV PEAK GRADIENT: 67 MMHG
ECHO AV PEAK VELOCITY: 4.1 M/S
ECHO AV REGURGITANT PHT: 842.6 MS
ECHO AV VELOCITY RATIO: 0.22
ECHO AV VTI: 100.8 CM
ECHO BSA: 2.2 M2
ECHO EST RA PRESSURE: 3 MMHG
ECHO LA DIAMETER INDEX: 1.7 CM/M2
ECHO LA DIAMETER: 3.7 CM
ECHO LA VOL A-L A2C: 74 ML (ref 18–58)
ECHO LA VOL A-L A4C: 71 ML (ref 18–58)
ECHO LA VOL MOD A2C: 71 ML (ref 18–58)
ECHO LA VOL MOD A4C: 68 ML (ref 18–58)
ECHO LA VOLUME AREA LENGTH: 78 ML
ECHO LA VOLUME INDEX A-L A2C: 34 ML/M2 (ref 16–34)
ECHO LA VOLUME INDEX A-L A4C: 33 ML/M2 (ref 16–34)
ECHO LA VOLUME INDEX AREA LENGTH: 36 ML/M2 (ref 16–34)
ECHO LA VOLUME INDEX MOD A2C: 33 ML/M2 (ref 16–34)
ECHO LA VOLUME INDEX MOD A4C: 31 ML/M2 (ref 16–34)
ECHO LV EF PHYSICIAN: 60 %
ECHO LV FRACTIONAL SHORTENING: 40 % (ref 28–44)
ECHO LV INTERNAL DIMENSION DIASTOLE INDEX: 1.83 CM/M2
ECHO LV INTERNAL DIMENSION DIASTOLIC: 4 CM (ref 4.2–5.9)
ECHO LV INTERNAL DIMENSION SYSTOLIC INDEX: 1.1 CM/M2
ECHO LV INTERNAL DIMENSION SYSTOLIC: 2.4 CM
ECHO LV ISOVOLUMETRIC RELAXATION TIME (IVRT): 78.4 MS
ECHO LV IVSD: 1.2 CM (ref 0.6–1)
ECHO LV IVSS: 1.4 CM
ECHO LV MASS 2D: 155.4 G (ref 88–224)
ECHO LV MASS INDEX 2D: 71.3 G/M2 (ref 49–115)
ECHO LV POSTERIOR WALL DIASTOLIC: 1.1 CM (ref 0.6–1)
ECHO LV POSTERIOR WALL SYSTOLIC: 1.8 CM
ECHO LV RELATIVE WALL THICKNESS RATIO: 0.55
ECHO LVOT AREA: 4.5 CM2
ECHO LVOT AV VTI INDEX: 0.22
ECHO LVOT DIAM: 2.4 CM
ECHO LVOT MEAN GRADIENT: 2 MMHG
ECHO LVOT PEAK GRADIENT: 4 MMHG
ECHO LVOT PEAK VELOCITY: 0.9 M/S
ECHO LVOT STROKE VOLUME INDEX: 45.2 ML/M2
ECHO LVOT SV: 98.6 ML
ECHO LVOT VTI: 21.8 CM
ECHO MV AREA PHT: 2.6 CM2
ECHO MV AREA VTI: 2.8 CM2
ECHO MV E DECELERATION TIME (DT): 333.5 MS
ECHO MV LVOT VTI INDEX: 1.62
ECHO MV MAX VELOCITY: 1.3 M/S
ECHO MV MEAN GRADIENT: 2 MMHG
ECHO MV MEAN VELOCITY: 0.6 M/S
ECHO MV PEAK GRADIENT: 7 MMHG
ECHO MV PRESSURE HALF TIME (PHT): 85.6 MS
ECHO MV VTI: 35.4 CM
ECHO PV MAX VELOCITY: 0.9 M/S
ECHO PV MEAN GRADIENT: 2 MMHG
ECHO PV MEAN VELOCITY: 0.6 M/S
ECHO PV PEAK GRADIENT: 3 MMHG
ECHO PV VTI: 16.6 CM
ECHO RIGHT VENTRICULAR SYSTOLIC PRESSURE (RVSP): 25 MMHG
ECHO RV BASAL DIMENSION: 4.1 CM
ECHO RV INTERNAL DIMENSION: 4.1 CM
ECHO RV MID DIMENSION: 2.8 CM
ECHO RV TAPSE: 2.5 CM (ref 1.7–?)
ECHO TV REGURGITANT MAX VELOCITY: 2.33 M/S
ECHO TV REGURGITANT PEAK GRADIENT: 22 MMHG

## 2025-07-31 PROCEDURE — 1159F MED LIST DOCD IN RCRD: CPT | Performed by: STUDENT IN AN ORGANIZED HEALTH CARE EDUCATION/TRAINING PROGRAM

## 2025-07-31 PROCEDURE — 93306 TTE W/DOPPLER COMPLETE: CPT | Performed by: INTERNAL MEDICINE

## 2025-07-31 PROCEDURE — 1036F TOBACCO NON-USER: CPT | Performed by: STUDENT IN AN ORGANIZED HEALTH CARE EDUCATION/TRAINING PROGRAM

## 2025-07-31 PROCEDURE — 93306 TTE W/DOPPLER COMPLETE: CPT

## 2025-07-31 PROCEDURE — G8417 CALC BMI ABV UP PARAM F/U: HCPCS | Performed by: STUDENT IN AN ORGANIZED HEALTH CARE EDUCATION/TRAINING PROGRAM

## 2025-07-31 PROCEDURE — 99214 OFFICE O/P EST MOD 30 MIN: CPT | Performed by: STUDENT IN AN ORGANIZED HEALTH CARE EDUCATION/TRAINING PROGRAM

## 2025-07-31 PROCEDURE — 1123F ACP DISCUSS/DSCN MKR DOCD: CPT | Performed by: STUDENT IN AN ORGANIZED HEALTH CARE EDUCATION/TRAINING PROGRAM

## 2025-07-31 PROCEDURE — G8427 DOCREV CUR MEDS BY ELIG CLIN: HCPCS | Performed by: STUDENT IN AN ORGANIZED HEALTH CARE EDUCATION/TRAINING PROGRAM

## 2025-07-31 PROCEDURE — 3078F DIAST BP <80 MM HG: CPT | Performed by: STUDENT IN AN ORGANIZED HEALTH CARE EDUCATION/TRAINING PROGRAM

## 2025-07-31 PROCEDURE — 3017F COLORECTAL CA SCREEN DOC REV: CPT | Performed by: STUDENT IN AN ORGANIZED HEALTH CARE EDUCATION/TRAINING PROGRAM

## 2025-07-31 PROCEDURE — 3074F SYST BP LT 130 MM HG: CPT | Performed by: STUDENT IN AN ORGANIZED HEALTH CARE EDUCATION/TRAINING PROGRAM

## 2025-07-31 ASSESSMENT — ENCOUNTER SYMPTOMS
COUGH: 0
CHEST TIGHTNESS: 0
ABDOMINAL PAIN: 0
NAUSEA: 0
SHORTNESS OF BREATH: 0
BACK PAIN: 0
VOMITING: 0
BLOOD IN STOOL: 0

## 2025-07-31 NOTE — PROGRESS NOTES
OFFICE VISIT    NAME: Chandana Sandoval     YOB: 1950   AGE: 75 y.o.   MEDICAL RECORD NUMBER: [id number]       CONSULTATION INFORMATION:   DATE OF CLINIC CONSULTATION: 7/31/2025   PRINCIPLE DIAGNOSIS / reason for visit: AS    CARE TEAM MEMBERS    PCP : Mario Lagos MD     PRIMARY CARDIOLOGIST: Dr. Han   REFERRING PROVIDER: Dr. Lagos    HISTORY OF PRESENT ILLNESS:   Chandana Sandoval is a 75 y.o. male referred by Dr. Lagos for AS. Past medical history of permanent AFib, HTN, HLD, depresssion, lumbar radiculopathy    He had an echo done after a murmur was detected at visit with PCP in February 2024. The study suggested severe AS, and he was referred to Dr. Han for further evaluation at which time he denied any cardiac symptoms. He then had follow up with Dr. Lagos who advised valve clinic referral.    He was seen in valve clinic June 2024 and January 2025 and he remained asymptomatic. He presents for 6-month follow up with echo today.      He is fairly active taking care of his wife who got cancer and he is able to go up and down the steps at home and run errands without symptoms.     Today he presents for follow-up.  He continues to deny having much of symptoms.  However, he does have a hip surgery that needs to be done and he needs clearance from cardiology prior to that.      - TTE 7/31/2025:  Mean gradient of 39.5 mmHg    TTE 1/9/25:    Limited echocardiogram ordered.    Left Ventricle: Normal left ventricular systolic function. EF by visual approximation is > 60%. Left ventricle size is normal. Proximal septal thickening.    Right Ventricle: Right ventricle size is normal. Normal systolic function.    Aortic Valve: Fibrocalcific changes present. Mild regurgitation (AV PHT is 660 msec). Moderate-severe stenosis. AV mean gradient is 35 mmHg. AV peak velocity is 3.8 m/s. AV Velocity Ratio is 0.24. LVOT:AV VTI Index is 0.24. LVOT diameter is 2.4 cm. AV area by continuity VTI is 1.1 cm2. AV area by

## 2025-08-01 ENCOUNTER — TELEPHONE (OUTPATIENT)
Dept: ORTHOPEDIC SURGERY | Age: 75
End: 2025-08-01

## 2025-08-04 ENCOUNTER — HOSPITAL ENCOUNTER (OUTPATIENT)
Dept: PREADMISSION TESTING | Age: 75
Discharge: HOME | End: 2025-08-04

## 2025-08-04 ENCOUNTER — TELEPHONE (OUTPATIENT)
Dept: CARDIOLOGY CLINIC | Age: 75
End: 2025-08-04

## 2025-08-04 DIAGNOSIS — Z01.818 PRE-OP EVALUATION: ICD-10-CM

## 2025-08-04 DIAGNOSIS — I35.0 NONRHEUMATIC AORTIC VALVE STENOSIS: Primary | ICD-10-CM

## 2025-08-11 ENCOUNTER — CLINICAL DOCUMENTATION (OUTPATIENT)
Dept: CARDIOLOGY | Age: 75
End: 2025-08-11

## 2025-08-13 ENCOUNTER — HOSPITAL ENCOUNTER (OUTPATIENT)
Dept: CT IMAGING | Age: 75
Discharge: HOME | End: 2025-08-15
Attending: INTERNAL MEDICINE
Payer: MEDICARE

## 2025-08-13 ENCOUNTER — HOSPITAL ENCOUNTER (OUTPATIENT)
Dept: CT IMAGING | Age: 75
Discharge: HOME | End: 2025-08-15
Attending: INTERNAL MEDICINE

## 2025-08-13 ENCOUNTER — HOSPITAL ENCOUNTER (OUTPATIENT)
Dept: LAB | Age: 75
Discharge: HOME | End: 2025-08-13
Attending: INTERNAL MEDICINE
Payer: MEDICARE

## 2025-08-13 DIAGNOSIS — I35.0 NONRHEUMATIC AORTIC VALVE STENOSIS: ICD-10-CM

## 2025-08-13 DIAGNOSIS — Z01.818 PRE-OP EVALUATION: ICD-10-CM

## 2025-08-13 LAB
ANION GAP SERPL CALCULATED.3IONS-SCNC: 12 MMOL/L (ref 7–16)
BASOPHILS # BLD: 0.04 K/UL (ref 0–0.2)
BASOPHILS NFR BLD: 1 % (ref 0–2)
BUN SERPL-MCNC: 12 MG/DL (ref 8–23)
CALCIUM SERPL-MCNC: 9.4 MG/DL (ref 8.8–10.2)
CHLORIDE SERPL-SCNC: 100 MMOL/L (ref 98–107)
CO2 SERPL-SCNC: 24 MMOL/L (ref 22–29)
CREAT SERPL-MCNC: 1.2 MG/DL (ref 0.7–1.2)
EOSINOPHIL # BLD: 0.03 K/UL (ref 0.05–0.5)
EOSINOPHILS RELATIVE PERCENT: 0 % (ref 0–6)
ERYTHROCYTE [DISTWIDTH] IN BLOOD BY AUTOMATED COUNT: 13.7 % (ref 11.5–15)
GFR, ESTIMATED: 65 ML/MIN/1.73M2
GLUCOSE SERPL-MCNC: 83 MG/DL (ref 74–99)
HCT VFR BLD AUTO: 44.3 % (ref 37–54)
HGB BLD-MCNC: 15 G/DL (ref 12.5–16.5)
IMM GRANULOCYTES # BLD AUTO: <0.03 K/UL (ref 0–0.58)
IMM GRANULOCYTES NFR BLD: 0 % (ref 0–5)
INR PPP: 1.4
LYMPHOCYTES NFR BLD: 3.11 K/UL (ref 1.5–4)
LYMPHOCYTES RELATIVE PERCENT: 41 % (ref 20–42)
MCH RBC QN AUTO: 29.4 PG (ref 26–35)
MCHC RBC AUTO-ENTMCNC: 33.9 G/DL (ref 32–34.5)
MCV RBC AUTO: 86.7 FL (ref 80–99.9)
MONOCYTES NFR BLD: 0.6 K/UL (ref 0.1–0.95)
MONOCYTES NFR BLD: 8 % (ref 2–12)
NEUTROPHILS NFR BLD: 50 % (ref 43–80)
NEUTS SEG NFR BLD: 3.79 K/UL (ref 1.8–7.3)
PARTIAL THROMBOPLASTIN TIME: 37 SEC (ref 24.5–35.1)
PLATELET # BLD AUTO: 192 K/UL (ref 130–450)
PMV BLD AUTO: 11.2 FL (ref 7–12)
POTASSIUM SERPL-SCNC: 4.3 MMOL/L (ref 3.5–5.1)
PROTHROMBIN TIME: 15 SEC (ref 9.3–12.4)
RBC # BLD AUTO: 5.11 M/UL (ref 3.8–5.8)
SODIUM SERPL-SCNC: 135 MMOL/L (ref 136–145)
WBC OTHER # BLD: 7.6 K/UL (ref 4.5–11.5)

## 2025-08-13 PROCEDURE — 85610 PROTHROMBIN TIME: CPT

## 2025-08-13 PROCEDURE — 71275 CT ANGIOGRAPHY CHEST: CPT

## 2025-08-13 PROCEDURE — 85025 COMPLETE CBC W/AUTO DIFF WBC: CPT

## 2025-08-13 PROCEDURE — 74174 CTA ABD&PLVS W/CONTRAST: CPT

## 2025-08-13 PROCEDURE — 2500000003 HC RX 250 WO HCPCS: Performed by: SPECIALIST

## 2025-08-13 PROCEDURE — 36415 COLL VENOUS BLD VENIPUNCTURE: CPT

## 2025-08-13 PROCEDURE — 80048 BASIC METABOLIC PNL TOTAL CA: CPT

## 2025-08-13 PROCEDURE — 85730 THROMBOPLASTIN TIME PARTIAL: CPT

## 2025-08-13 PROCEDURE — 75572 CT HRT W/3D IMAGE: CPT

## 2025-08-13 PROCEDURE — 6360000004 HC RX CONTRAST MEDICATION: Performed by: SPECIALIST

## 2025-08-13 RX ORDER — SODIUM CHLORIDE 0.9 % (FLUSH) 0.9 %
10 SYRINGE (ML) INJECTION PRN
Status: DISCONTINUED | OUTPATIENT
Start: 2025-08-13 | End: 2025-08-16 | Stop reason: HOSPADM

## 2025-08-13 RX ORDER — IOPAMIDOL 755 MG/ML
100 INJECTION, SOLUTION INTRAVASCULAR
Status: COMPLETED | OUTPATIENT
Start: 2025-08-13 | End: 2025-08-13

## 2025-08-13 RX ADMIN — IOPAMIDOL 100 ML: 755 INJECTION, SOLUTION INTRAVENOUS at 13:48

## 2025-08-13 RX ADMIN — Medication 10 ML: at 13:46

## 2025-08-15 ENCOUNTER — TELEPHONE (OUTPATIENT)
Age: 75
End: 2025-08-15

## 2025-08-15 LAB
ABO + RH BLD: NORMAL
ARM BAND NUMBER: NORMAL
BLOOD BANK SAMPLE EXPIRATION: NORMAL
BLOOD GROUP ANTIBODIES SERPL: NEGATIVE

## 2025-08-15 PROCEDURE — 86901 BLOOD TYPING SEROLOGIC RH(D): CPT

## 2025-08-15 PROCEDURE — 86900 BLOOD TYPING SEROLOGIC ABO: CPT

## 2025-08-15 PROCEDURE — 36415 COLL VENOUS BLD VENIPUNCTURE: CPT

## 2025-08-15 PROCEDURE — 86850 RBC ANTIBODY SCREEN: CPT

## 2025-08-18 ENCOUNTER — HOSPITAL ENCOUNTER (OUTPATIENT)
Age: 75
Setting detail: OUTPATIENT SURGERY
Discharge: HOME | End: 2025-08-18
Attending: INTERNAL MEDICINE | Admitting: INTERNAL MEDICINE
Payer: MEDICARE

## 2025-08-18 VITALS
SYSTOLIC BLOOD PRESSURE: 122 MMHG | OXYGEN SATURATION: 96 % | DIASTOLIC BLOOD PRESSURE: 68 MMHG | TEMPERATURE: 97.5 F | BODY MASS INDEX: 27.31 KG/M2 | WEIGHT: 207 LBS | HEART RATE: 65 BPM | RESPIRATION RATE: 19 BRPM

## 2025-08-18 DIAGNOSIS — I35.0 NODULAR CALCIFIC AORTIC VALVE STENOSIS: ICD-10-CM

## 2025-08-18 PROCEDURE — 6360000002 HC RX W HCPCS: Performed by: INTERNAL MEDICINE

## 2025-08-18 RX ORDER — SENNOSIDES 8.6 MG
325 CAPSULE ORAL ONCE
Status: CANCELLED | OUTPATIENT
Start: 2025-08-18 | End: 2025-08-18

## 2025-08-18 RX ORDER — FENTANYL CITRATE 50 UG/ML
INJECTION, SOLUTION INTRAMUSCULAR; INTRAVENOUS PRN
Status: DISCONTINUED | OUTPATIENT
Start: 2025-08-18 | End: 2025-08-18 | Stop reason: HOSPADM

## 2025-08-18 RX ORDER — LIDOCAINE HCL/PF 100 MG/5ML
SYRINGE (ML) INJECTION PRN
Status: DISCONTINUED | OUTPATIENT
Start: 2025-08-18 | End: 2025-08-18 | Stop reason: HOSPADM

## 2025-08-18 ASSESSMENT — PAIN SCALES - GENERAL
PAINLEVEL_OUTOF10: 0
PAINLEVEL_OUTOF10: 0

## 2025-08-22 ENCOUNTER — TELEPHONE (OUTPATIENT)
Dept: CARDIOLOGY CLINIC | Age: 75
End: 2025-08-22

## 2025-08-29 ENCOUNTER — HOSPITAL ENCOUNTER (OUTPATIENT)
Dept: LAB | Age: 75
Discharge: HOME | End: 2025-08-29
Payer: MEDICARE

## 2025-08-29 DIAGNOSIS — I35.0 NONRHEUMATIC AORTIC VALVE STENOSIS: ICD-10-CM

## 2025-08-29 DIAGNOSIS — Z01.818 PRE-OP EVALUATION: ICD-10-CM

## 2025-08-29 LAB
ABO + RH BLD: NORMAL
ANION GAP SERPL CALCULATED.3IONS-SCNC: 13 MMOL/L (ref 7–16)
ARM BAND NUMBER: NORMAL
BASOPHILS # BLD: 0.04 K/UL (ref 0–0.2)
BASOPHILS NFR BLD: 1 % (ref 0–2)
BLOOD BANK SAMPLE EXPIRATION: NORMAL
BLOOD GROUP ANTIBODIES SERPL: NEGATIVE
BUN SERPL-MCNC: 15 MG/DL (ref 8–23)
CALCIUM SERPL-MCNC: 9.6 MG/DL (ref 8.8–10.2)
CHLORIDE SERPL-SCNC: 98 MMOL/L (ref 98–107)
CO2 SERPL-SCNC: 26 MMOL/L (ref 22–29)
CREAT SERPL-MCNC: 1.1 MG/DL (ref 0.7–1.2)
EOSINOPHIL # BLD: 0.03 K/UL (ref 0.05–0.5)
EOSINOPHILS RELATIVE PERCENT: 0 % (ref 0–6)
ERYTHROCYTE [DISTWIDTH] IN BLOOD BY AUTOMATED COUNT: 13.8 % (ref 11.5–15)
GFR, ESTIMATED: 74 ML/MIN/1.73M2
GLUCOSE SERPL-MCNC: 106 MG/DL (ref 74–99)
HCT VFR BLD AUTO: 46.5 % (ref 37–54)
HGB BLD-MCNC: 15.1 G/DL (ref 12.5–16.5)
IMM GRANULOCYTES # BLD AUTO: 0.04 K/UL (ref 0–0.58)
IMM GRANULOCYTES NFR BLD: 1 % (ref 0–5)
INR PPP: 1.4
LYMPHOCYTES NFR BLD: 2.86 K/UL (ref 1.5–4)
LYMPHOCYTES RELATIVE PERCENT: 35 % (ref 20–42)
MCH RBC QN AUTO: 28.7 PG (ref 26–35)
MCHC RBC AUTO-ENTMCNC: 32.5 G/DL (ref 32–34.5)
MCV RBC AUTO: 88.4 FL (ref 80–99.9)
MONOCYTES NFR BLD: 0.56 K/UL (ref 0.1–0.95)
MONOCYTES NFR BLD: 7 % (ref 2–12)
NEUTROPHILS NFR BLD: 57 % (ref 43–80)
NEUTS SEG NFR BLD: 4.66 K/UL (ref 1.8–7.3)
PARTIAL THROMBOPLASTIN TIME: 37 SEC (ref 24.5–35.1)
PLATELET # BLD AUTO: 181 K/UL (ref 130–450)
PMV BLD AUTO: 11.5 FL (ref 7–12)
POTASSIUM SERPL-SCNC: 4.6 MMOL/L (ref 3.5–5.1)
PROTHROMBIN TIME: 15.5 SEC (ref 9.3–12.4)
RBC # BLD AUTO: 5.26 M/UL (ref 3.8–5.8)
SODIUM SERPL-SCNC: 137 MMOL/L (ref 136–145)
WBC OTHER # BLD: 8.2 K/UL (ref 4.5–11.5)

## 2025-08-29 PROCEDURE — 36415 COLL VENOUS BLD VENIPUNCTURE: CPT

## 2025-08-29 PROCEDURE — 85610 PROTHROMBIN TIME: CPT

## 2025-08-29 PROCEDURE — 86901 BLOOD TYPING SEROLOGIC RH(D): CPT

## 2025-08-29 PROCEDURE — 80048 BASIC METABOLIC PNL TOTAL CA: CPT

## 2025-08-29 PROCEDURE — 85730 THROMBOPLASTIN TIME PARTIAL: CPT

## 2025-08-29 PROCEDURE — 85025 COMPLETE CBC W/AUTO DIFF WBC: CPT

## 2025-08-29 PROCEDURE — 86900 BLOOD TYPING SEROLOGIC ABO: CPT

## 2025-08-29 PROCEDURE — 86850 RBC ANTIBODY SCREEN: CPT

## 2025-09-03 ENCOUNTER — HOSPITAL ENCOUNTER (OUTPATIENT)
Age: 75
Setting detail: OUTPATIENT SURGERY
Discharge: HOME | End: 2025-09-03
Attending: INTERNAL MEDICINE | Admitting: INTERNAL MEDICINE
Payer: MEDICARE

## 2025-09-03 VITALS
OXYGEN SATURATION: 95 % | WEIGHT: 207 LBS | SYSTOLIC BLOOD PRESSURE: 110 MMHG | HEART RATE: 70 BPM | BODY MASS INDEX: 27.43 KG/M2 | RESPIRATION RATE: 17 BRPM | HEIGHT: 73 IN | TEMPERATURE: 98.3 F | DIASTOLIC BLOOD PRESSURE: 64 MMHG

## 2025-09-03 DIAGNOSIS — I35.0 AORTIC VALVE STENOSIS, ETIOLOGY OF CARDIAC VALVE DISEASE UNSPECIFIED: ICD-10-CM

## 2025-09-03 LAB — ECHO BSA: 2.2 M2

## 2025-09-03 PROCEDURE — 7100000010 HC PHASE II RECOVERY - FIRST 15 MIN: Performed by: INTERNAL MEDICINE

## 2025-09-03 PROCEDURE — 6360000004 HC RX CONTRAST MEDICATION: Performed by: INTERNAL MEDICINE

## 2025-09-03 PROCEDURE — C1894 INTRO/SHEATH, NON-LASER: HCPCS | Performed by: INTERNAL MEDICINE

## 2025-09-03 PROCEDURE — 6360000002 HC RX W HCPCS: Performed by: INTERNAL MEDICINE

## 2025-09-03 PROCEDURE — 2500000003 HC RX 250 WO HCPCS: Performed by: INTERNAL MEDICINE

## 2025-09-03 PROCEDURE — C1769 GUIDE WIRE: HCPCS | Performed by: INTERNAL MEDICINE

## 2025-09-03 PROCEDURE — 7100000011 HC PHASE II RECOVERY - ADDTL 15 MIN: Performed by: INTERNAL MEDICINE

## 2025-09-03 PROCEDURE — 2709999900 HC NON-CHARGEABLE SUPPLY: Performed by: INTERNAL MEDICINE

## 2025-09-03 PROCEDURE — 93456 R HRT CORONARY ARTERY ANGIO: CPT | Performed by: INTERNAL MEDICINE

## 2025-09-03 PROCEDURE — C1751 CATH, INF, PER/CENT/MIDLINE: HCPCS | Performed by: INTERNAL MEDICINE

## 2025-09-03 RX ORDER — SODIUM CHLORIDE 9 MG/ML
INJECTION, SOLUTION INTRAVENOUS PRN
Status: CANCELLED | OUTPATIENT
Start: 2025-09-03

## 2025-09-03 RX ORDER — SODIUM CHLORIDE 0.9 % (FLUSH) 0.9 %
5-40 SYRINGE (ML) INJECTION EVERY 12 HOURS SCHEDULED
Status: CANCELLED | OUTPATIENT
Start: 2025-09-03

## 2025-09-03 RX ORDER — HEPARIN SODIUM 1000 [USP'U]/ML
INJECTION, SOLUTION INTRAVENOUS; SUBCUTANEOUS PRN
Status: DISCONTINUED | OUTPATIENT
Start: 2025-09-03 | End: 2025-09-03 | Stop reason: HOSPADM

## 2025-09-03 RX ORDER — IOPAMIDOL 755 MG/ML
INJECTION, SOLUTION INTRAVASCULAR PRN
Status: DISCONTINUED | OUTPATIENT
Start: 2025-09-03 | End: 2025-09-03 | Stop reason: HOSPADM

## 2025-09-03 RX ORDER — SODIUM CHLORIDE 0.9 % (FLUSH) 0.9 %
5-40 SYRINGE (ML) INJECTION PRN
Status: CANCELLED | OUTPATIENT
Start: 2025-09-03

## (undated) DEVICE — CATHETER PULM ART 5FR L100CM PVC 3 LUMN TRUE SZ THRMDIL

## (undated) DEVICE — PAD DEFIB AD RADIOTRANSPARENT W LD OUT

## (undated) DEVICE — CANNULA NSL CANN NSL L25FT TBNG AD O2 SUP SFT UC

## (undated) DEVICE — Device

## (undated) DEVICE — CATHETER DIAG 6FR L110CM PIGTAILS CRV STYL PIG145 DXTERITY

## (undated) DEVICE — CATHETER COR DIAG PIGTAILS PIG 145 CRV 5FR 110CM 6 SIDE H

## (undated) DEVICE — SURGICAL PROCEDURE KIT 2 W

## (undated) DEVICE — INTRODUCER SHTH 5FR L10CM GWIRE L45CM 0.021IN NDL 22GA

## (undated) DEVICE — SHEATH INTRO 5FR L10CM MINI GWIRE L45CM 0.035IN PERIPH KINK

## (undated) DEVICE — SHEATH INTRO 6FR L10CM DIL 0.021IN PLAS SHT ANG GWIRE L45CM

## (undated) DEVICE — GUIDEWIRE VASC L260CM 0.035IN J TIP L3MM PTFE FIX COR NAMIC

## (undated) DEVICE — CATHETER DIAG 6FR L100CM LUMN ID0.056IN JR4 CRV 0 SIDE H

## (undated) DEVICE — KIT ANGIO W/ AT P65 PREM HND CTRL FOR CNTRST DEL ANGIOTOUCH

## (undated) DEVICE — BAND COMPR L24CM REG CLR PLAS HEMSTAT EXT HK AND LOOP RETEN

## (undated) DEVICE — KIT INTRO SHTH 6FR L10CM DIA0.087IN NDL L1.5IN 21GA GWIRE

## (undated) DEVICE — CATHETER COR DIAG JUDKINS L 3.5 CRV 6FR 100CM 0 SIDE H

## (undated) DEVICE — KIT MFLD ISOLATN NACL CNTRST PRT TBNG SPIK W/ PRSS TRNSDUC